# Patient Record
Sex: FEMALE | Race: WHITE | Employment: OTHER | URBAN - NONMETROPOLITAN AREA
[De-identification: names, ages, dates, MRNs, and addresses within clinical notes are randomized per-mention and may not be internally consistent; named-entity substitution may affect disease eponyms.]

---

## 2017-02-01 ENCOUNTER — OFFICE VISIT (OUTPATIENT)
Dept: CARDIOLOGY | Age: 82
End: 2017-02-01
Payer: MEDICARE

## 2017-02-01 VITALS
HEART RATE: 72 BPM | DIASTOLIC BLOOD PRESSURE: 74 MMHG | WEIGHT: 129 LBS | BODY MASS INDEX: 23.74 KG/M2 | SYSTOLIC BLOOD PRESSURE: 120 MMHG | HEIGHT: 62 IN

## 2017-02-01 DIAGNOSIS — R00.2 HEART PALPITATIONS: ICD-10-CM

## 2017-02-01 DIAGNOSIS — I38 VALVULAR HEART DISEASE: ICD-10-CM

## 2017-02-01 DIAGNOSIS — I10 ESSENTIAL HYPERTENSION: Primary | ICD-10-CM

## 2017-02-01 PROCEDURE — G8427 DOCREV CUR MEDS BY ELIG CLIN: HCPCS | Performed by: INTERNAL MEDICINE

## 2017-02-01 PROCEDURE — G8484 FLU IMMUNIZE NO ADMIN: HCPCS | Performed by: INTERNAL MEDICINE

## 2017-02-01 PROCEDURE — G8420 CALC BMI NORM PARAMETERS: HCPCS | Performed by: INTERNAL MEDICINE

## 2017-02-01 PROCEDURE — 1036F TOBACCO NON-USER: CPT | Performed by: INTERNAL MEDICINE

## 2017-02-01 PROCEDURE — 1123F ACP DISCUSS/DSCN MKR DOCD: CPT | Performed by: INTERNAL MEDICINE

## 2017-02-01 PROCEDURE — 99213 OFFICE O/P EST LOW 20 MIN: CPT | Performed by: INTERNAL MEDICINE

## 2017-02-01 PROCEDURE — 1090F PRES/ABSN URINE INCON ASSESS: CPT | Performed by: INTERNAL MEDICINE

## 2017-02-01 PROCEDURE — 4040F PNEUMOC VAC/ADMIN/RCVD: CPT | Performed by: INTERNAL MEDICINE

## 2017-05-16 ENCOUNTER — OFFICE VISIT (OUTPATIENT)
Dept: CARDIOLOGY | Age: 82
End: 2017-05-16
Payer: MEDICARE

## 2017-05-16 VITALS
BODY MASS INDEX: 22.5 KG/M2 | HEART RATE: 68 BPM | HEIGHT: 63 IN | WEIGHT: 127 LBS | SYSTOLIC BLOOD PRESSURE: 138 MMHG | DIASTOLIC BLOOD PRESSURE: 78 MMHG

## 2017-05-16 DIAGNOSIS — R00.2 HEART PALPITATIONS: ICD-10-CM

## 2017-05-16 DIAGNOSIS — I10 ESSENTIAL HYPERTENSION: Primary | ICD-10-CM

## 2017-05-16 DIAGNOSIS — I34.0 MITRAL VALVE INSUFFICIENCY, UNSPECIFIED ETIOLOGY: ICD-10-CM

## 2017-05-16 DIAGNOSIS — I35.1 AORTIC VALVE INSUFFICIENCY, UNSPECIFIED ETIOLOGY: ICD-10-CM

## 2017-05-16 PROCEDURE — G8419 CALC BMI OUT NRM PARAM NOF/U: HCPCS | Performed by: CLINICAL NURSE SPECIALIST

## 2017-05-16 PROCEDURE — 1123F ACP DISCUSS/DSCN MKR DOCD: CPT | Performed by: CLINICAL NURSE SPECIALIST

## 2017-05-16 PROCEDURE — 1090F PRES/ABSN URINE INCON ASSESS: CPT | Performed by: CLINICAL NURSE SPECIALIST

## 2017-05-16 PROCEDURE — 99213 OFFICE O/P EST LOW 20 MIN: CPT | Performed by: CLINICAL NURSE SPECIALIST

## 2017-05-16 PROCEDURE — 4040F PNEUMOC VAC/ADMIN/RCVD: CPT | Performed by: CLINICAL NURSE SPECIALIST

## 2017-05-16 PROCEDURE — G8427 DOCREV CUR MEDS BY ELIG CLIN: HCPCS | Performed by: CLINICAL NURSE SPECIALIST

## 2017-05-16 PROCEDURE — 1036F TOBACCO NON-USER: CPT | Performed by: CLINICAL NURSE SPECIALIST

## 2017-08-23 ENCOUNTER — HOSPITAL ENCOUNTER (OUTPATIENT)
Dept: CT IMAGING | Age: 82
Discharge: HOME | End: 2017-08-23
Payer: MEDICARE

## 2017-08-23 DIAGNOSIS — R91.1 PULMONARY NODULE: ICD-10-CM

## 2017-08-23 PROCEDURE — 71250 CT THORAX DX C-: CPT

## 2018-01-02 ENCOUNTER — HOSPITAL ENCOUNTER (OUTPATIENT)
Dept: ULTRASOUND IMAGING | Facility: HOSPITAL | Age: 83
Discharge: HOME | End: 2018-01-02
Attending: INTERNAL MEDICINE | Admitting: INTERNAL MEDICINE

## 2018-01-02 ENCOUNTER — TRANSCRIBE ORDERS (OUTPATIENT)
Dept: ADMINISTRATIVE | Facility: HOSPITAL | Age: 83
End: 2018-01-02

## 2018-01-02 DIAGNOSIS — R42 DIZZINESS AND GIDDINESS: ICD-10-CM

## 2018-01-02 DIAGNOSIS — R42 DIZZINESS AND GIDDINESS: Primary | ICD-10-CM

## 2018-01-02 PROCEDURE — 93880 EXTRACRANIAL BILAT STUDY: CPT

## 2018-01-22 ENCOUNTER — HOSPITAL ENCOUNTER (EMERGENCY)
Age: 83
Discharge: HOME | End: 2018-01-22
Attending: EMERGENCY MEDICINE
Payer: MEDICARE

## 2018-01-22 VITALS
HEART RATE: 85 BPM | SYSTOLIC BLOOD PRESSURE: 145 MMHG | RESPIRATION RATE: 16 BRPM | DIASTOLIC BLOOD PRESSURE: 67 MMHG | BODY MASS INDEX: 23 KG/M2 | OXYGEN SATURATION: 94 % | TEMPERATURE: 97.7 F | WEIGHT: 125 LBS | HEIGHT: 62 IN

## 2018-01-22 DIAGNOSIS — I10 ESSENTIAL HYPERTENSION: Primary | ICD-10-CM

## 2018-01-22 LAB
ANION GAP SERPL CALCULATED.3IONS-SCNC: 13 MMOL/L (ref 7–19)
BASOPHILS ABSOLUTE: 0 K/UL (ref 0–0.2)
BASOPHILS RELATIVE PERCENT: 0.4 % (ref 0–1)
BUN BLDV-MCNC: 14 MG/DL (ref 8–23)
CALCIUM SERPL-MCNC: 9.3 MG/DL (ref 8.8–10.2)
CHLORIDE BLD-SCNC: 98 MMOL/L (ref 98–111)
CO2: 29 MMOL/L (ref 22–29)
CREAT SERPL-MCNC: 0.9 MG/DL (ref 0.5–0.9)
EOSINOPHILS ABSOLUTE: 0.1 K/UL (ref 0–0.6)
EOSINOPHILS RELATIVE PERCENT: 1.2 % (ref 0–5)
GFR NON-AFRICAN AMERICAN: 59
GLUCOSE BLD-MCNC: 112 MG/DL (ref 74–109)
HCT VFR BLD CALC: 36.6 % (ref 37–47)
HEMOGLOBIN: 12.4 G/DL (ref 12–16)
LYMPHOCYTES ABSOLUTE: 1.1 K/UL (ref 1.1–4.5)
LYMPHOCYTES RELATIVE PERCENT: 21 % (ref 20–40)
MCH RBC QN AUTO: 30.3 PG (ref 27–31)
MCHC RBC AUTO-ENTMCNC: 33.9 G/DL (ref 33–37)
MCV RBC AUTO: 89.5 FL (ref 81–99)
MONOCYTES ABSOLUTE: 0.3 K/UL (ref 0–0.9)
MONOCYTES RELATIVE PERCENT: 5.4 % (ref 0–10)
NEUTROPHILS ABSOLUTE: 3.6 K/UL (ref 1.5–7.5)
NEUTROPHILS RELATIVE PERCENT: 71.8 % (ref 50–65)
PDW BLD-RTO: 12.6 % (ref 11.5–14.5)
PERFORMED ON: NORMAL
PLATELET # BLD: 162 K/UL (ref 130–400)
PMV BLD AUTO: 10.3 FL (ref 9.4–12.3)
POC TROPONIN I: 0 NG/ML (ref 0–0.08)
POTASSIUM SERPL-SCNC: 3.2 MMOL/L (ref 3.5–5)
RBC # BLD: 4.09 M/UL (ref 4.2–5.4)
SODIUM BLD-SCNC: 140 MMOL/L (ref 136–145)
WBC # BLD: 5 K/UL (ref 4.8–10.8)

## 2018-01-22 PROCEDURE — 80048 BASIC METABOLIC PNL TOTAL CA: CPT

## 2018-01-22 PROCEDURE — 99283 EMERGENCY DEPT VISIT LOW MDM: CPT

## 2018-01-22 PROCEDURE — 84484 ASSAY OF TROPONIN QUANT: CPT

## 2018-01-22 PROCEDURE — 36415 COLL VENOUS BLD VENIPUNCTURE: CPT

## 2018-01-22 PROCEDURE — 99283 EMERGENCY DEPT VISIT LOW MDM: CPT | Performed by: EMERGENCY MEDICINE

## 2018-01-22 PROCEDURE — 93005 ELECTROCARDIOGRAM TRACING: CPT

## 2018-01-22 PROCEDURE — 85025 COMPLETE CBC W/AUTO DIFF WBC: CPT

## 2018-01-22 RX ORDER — CLONIDINE HYDROCHLORIDE 0.1 MG/1
0.1 TABLET, EXTENDED RELEASE ORAL PRN
Status: ON HOLD | COMMUNITY
End: 2019-05-28 | Stop reason: ALTCHOICE

## 2018-01-22 RX ORDER — PRAVASTATIN SODIUM 10 MG
10 TABLET ORAL DAILY
Status: ON HOLD | COMMUNITY
End: 2019-05-28 | Stop reason: ALTCHOICE

## 2018-01-22 RX ORDER — CHLORTHALIDONE 25 MG/1
25 TABLET ORAL DAILY
COMMUNITY
End: 2018-01-27 | Stop reason: ALTCHOICE

## 2018-01-22 RX ORDER — ESTRADIOL 1 MG/1
1 TABLET ORAL DAILY
COMMUNITY
End: 2018-01-27 | Stop reason: ALTCHOICE

## 2018-01-22 ASSESSMENT — ENCOUNTER SYMPTOMS
SHORTNESS OF BREATH: 0
DIARRHEA: 0
BACK PAIN: 0
ABDOMINAL PAIN: 0
RHINORRHEA: 0
NAUSEA: 0
SORE THROAT: 0
VOMITING: 0

## 2018-01-24 LAB
EKG P AXIS: 67 DEGREES
EKG P-R INTERVAL: 158 MS
EKG Q-T INTERVAL: 402 MS
EKG QRS DURATION: 86 MS
EKG QTC CALCULATION (BAZETT): 428 MS
EKG T AXIS: 37 DEGREES

## 2018-01-27 ENCOUNTER — APPOINTMENT (OUTPATIENT)
Dept: CT IMAGING | Age: 83
End: 2018-01-27
Payer: MEDICARE

## 2018-01-27 ENCOUNTER — APPOINTMENT (OUTPATIENT)
Dept: GENERAL RADIOLOGY | Age: 83
End: 2018-01-27
Payer: MEDICARE

## 2018-01-27 ENCOUNTER — HOSPITAL ENCOUNTER (EMERGENCY)
Age: 83
Discharge: HOME | End: 2018-01-27
Attending: EMERGENCY MEDICINE
Payer: MEDICARE

## 2018-01-27 VITALS
RESPIRATION RATE: 16 BRPM | DIASTOLIC BLOOD PRESSURE: 64 MMHG | HEART RATE: 77 BPM | OXYGEN SATURATION: 96 % | SYSTOLIC BLOOD PRESSURE: 150 MMHG | TEMPERATURE: 98.1 F | BODY MASS INDEX: 22.26 KG/M2 | HEIGHT: 62 IN | WEIGHT: 121 LBS

## 2018-01-27 DIAGNOSIS — I10 ESSENTIAL HYPERTENSION: Primary | ICD-10-CM

## 2018-01-27 DIAGNOSIS — R51.9 ACUTE NONINTRACTABLE HEADACHE, UNSPECIFIED HEADACHE TYPE: ICD-10-CM

## 2018-01-27 LAB
ANION GAP SERPL CALCULATED.3IONS-SCNC: 11 MMOL/L (ref 7–19)
BASOPHILS ABSOLUTE: 0 K/UL (ref 0–0.2)
BASOPHILS RELATIVE PERCENT: 0.3 % (ref 0–1)
BUN BLDV-MCNC: 13 MG/DL (ref 8–23)
CALCIUM SERPL-MCNC: 9.1 MG/DL (ref 8.8–10.2)
CHLORIDE BLD-SCNC: 95 MMOL/L (ref 98–111)
CO2: 32 MMOL/L (ref 22–29)
CREAT SERPL-MCNC: 0.8 MG/DL (ref 0.5–0.9)
EOSINOPHILS ABSOLUTE: 0.2 K/UL (ref 0–0.6)
EOSINOPHILS RELATIVE PERCENT: 2.5 % (ref 0–5)
GFR NON-AFRICAN AMERICAN: >60
GLUCOSE BLD-MCNC: 117 MG/DL (ref 74–109)
HCT VFR BLD CALC: 33.6 % (ref 37–47)
HEMOGLOBIN: 11.4 G/DL (ref 12–16)
LYMPHOCYTES ABSOLUTE: 1.8 K/UL (ref 1.1–4.5)
LYMPHOCYTES RELATIVE PERCENT: 28.6 % (ref 20–40)
MCH RBC QN AUTO: 30.5 PG (ref 27–31)
MCHC RBC AUTO-ENTMCNC: 33.9 G/DL (ref 33–37)
MCV RBC AUTO: 89.8 FL (ref 81–99)
MONOCYTES ABSOLUTE: 0.5 K/UL (ref 0–0.9)
MONOCYTES RELATIVE PERCENT: 8.2 % (ref 0–10)
NEUTROPHILS ABSOLUTE: 3.9 K/UL (ref 1.5–7.5)
NEUTROPHILS RELATIVE PERCENT: 60.1 % (ref 50–65)
PDW BLD-RTO: 12.7 % (ref 11.5–14.5)
PERFORMED ON: NORMAL
PLATELET # BLD: 161 K/UL (ref 130–400)
PMV BLD AUTO: 10.4 FL (ref 9.4–12.3)
POC TROPONIN I: 0 NG/ML (ref 0–0.08)
POTASSIUM SERPL-SCNC: 3.3 MMOL/L (ref 3.5–5)
RBC # BLD: 3.74 M/UL (ref 4.2–5.4)
SODIUM BLD-SCNC: 138 MMOL/L (ref 136–145)
WBC # BLD: 6.4 K/UL (ref 4.8–10.8)

## 2018-01-27 PROCEDURE — 80048 BASIC METABOLIC PNL TOTAL CA: CPT

## 2018-01-27 PROCEDURE — 93005 ELECTROCARDIOGRAM TRACING: CPT

## 2018-01-27 PROCEDURE — 71045 X-RAY EXAM CHEST 1 VIEW: CPT

## 2018-01-27 PROCEDURE — 99284 EMERGENCY DEPT VISIT MOD MDM: CPT

## 2018-01-27 PROCEDURE — 70450 CT HEAD/BRAIN W/O DYE: CPT

## 2018-01-27 PROCEDURE — 85025 COMPLETE CBC W/AUTO DIFF WBC: CPT

## 2018-01-27 PROCEDURE — 84484 ASSAY OF TROPONIN QUANT: CPT

## 2018-01-27 PROCEDURE — 99285 EMERGENCY DEPT VISIT HI MDM: CPT | Performed by: EMERGENCY MEDICINE

## 2018-01-27 PROCEDURE — 36415 COLL VENOUS BLD VENIPUNCTURE: CPT

## 2018-01-27 RX ORDER — AMLODIPINE BESYLATE 2.5 MG/1
2.5 TABLET ORAL DAILY
Status: ON HOLD | COMMUNITY
End: 2019-05-28 | Stop reason: ALTCHOICE

## 2018-01-27 ASSESSMENT — ENCOUNTER SYMPTOMS
BACK PAIN: 0
SHORTNESS OF BREATH: 0
VOMITING: 0
NAUSEA: 0
ABDOMINAL PAIN: 0
SORE THROAT: 0
RHINORRHEA: 0
DIARRHEA: 0

## 2018-01-27 NOTE — ED PROVIDER NOTES
Negative for weakness. Psychiatric/Behavioral: Negative for confusion. A complete review of systems was performed and is negative except as noted above in the HPI. PAST MEDICAL HISTORY     Past Medical History:   Diagnosis Date    Heart palpitations     Hypertension          SURGICAL HISTORY       Past Surgical History:   Procedure Laterality Date    CAROTID ENDARTERECTOMY      CHOLECYSTECTOMY      HYSTERECTOMY      KIDNEY STONE SURGERY           CURRENT MEDICATIONS       Previous Medications    AMLODIPINE (NORVASC) 2.5 MG TABLET    Take 2.5 mg by mouth daily    CLONIDINE (KAPVAY) 0.1 MG TB12 EXTENDED RELEASE TABLET    Take 0.1 mg by mouth as needed    PRAVASTATIN (PRAVACHOL) 10 MG TABLET    Take 10 mg by mouth daily       ALLERGIES     Codeine    FAMILY HISTORY       Family History   Problem Relation Age of Onset    Stroke Mother      CAD, palpitations,  12          SOCIAL HISTORY       Social History     Social History    Marital status:      Spouse name: N/A    Number of children: N/A    Years of education: N/A     Social History Main Topics    Smoking status: Never Smoker    Smokeless tobacco: Never Used    Alcohol use No    Drug use: No    Sexual activity: Not Asked     Other Topics Concern    None     Social History Narrative    None       SCREENINGS             PHYSICAL EXAM    (up to 7 for level 4, 8 or more for level 5)     ED Triage Vitals [18 0233]   BP Temp Temp src Pulse Resp SpO2 Height Weight   (!) 152/91 98.1 °F (36.7 °C) -- 84 -- 95 % 5' 2\" (1.575 m) 121 lb (54.9 kg)       Physical Exam   Constitutional: She is oriented to person, place, and time. She appears well-developed and well-nourished. No distress. HENT:   Head: Normocephalic and atraumatic. Eyes: Pupils are equal, round, and reactive to light. Neck: Normal range of motion. Neck supple. Cardiovascular: Normal rate, regular rhythm, normal heart sounds and intact distal pulses.

## 2018-01-27 NOTE — ED NOTES
Patient placed in a gown Patient placed on cardiac monitor, continuous pulse oximeter, and NIBP monitor.  Monitor alarms on.       Jean-Pierre Ortiz RN  01/27/18 0625

## 2018-01-29 LAB
EKG P AXIS: 54 DEGREES
EKG P-R INTERVAL: 166 MS
EKG Q-T INTERVAL: 370 MS
EKG QRS DURATION: 88 MS
EKG QTC CALCULATION (BAZETT): 398 MS
EKG T AXIS: 22 DEGREES

## 2018-04-17 ENCOUNTER — HOSPITAL ENCOUNTER (OUTPATIENT)
Dept: NON INVASIVE DIAGNOSTICS | Age: 83
Discharge: HOME | End: 2018-04-17
Payer: MEDICARE

## 2018-04-17 ENCOUNTER — HOSPITAL ENCOUNTER (OUTPATIENT)
Dept: CT IMAGING | Age: 83
Discharge: HOME | End: 2018-04-17
Payer: MEDICARE

## 2018-04-17 DIAGNOSIS — R91.1 LUNG NODULE: ICD-10-CM

## 2018-04-17 PROCEDURE — 71250 CT THORAX DX C-: CPT

## 2018-04-17 PROCEDURE — 93225 XTRNL ECG REC<48 HRS REC: CPT

## 2018-04-17 PROCEDURE — 93227 XTRNL ECG REC<48 HR R&I: CPT | Performed by: INTERNAL MEDICINE

## 2018-04-17 PROCEDURE — 93226 XTRNL ECG REC<48 HR SCAN A/R: CPT

## 2018-05-28 ENCOUNTER — HOSPITAL ENCOUNTER (EMERGENCY)
Age: 83
Discharge: HOME | End: 2018-05-28
Payer: MEDICARE

## 2018-05-28 VITALS
HEIGHT: 63 IN | RESPIRATION RATE: 18 BRPM | DIASTOLIC BLOOD PRESSURE: 68 MMHG | OXYGEN SATURATION: 94 % | HEART RATE: 69 BPM | TEMPERATURE: 97.5 F | BODY MASS INDEX: 21.26 KG/M2 | SYSTOLIC BLOOD PRESSURE: 136 MMHG | WEIGHT: 120 LBS

## 2018-05-28 DIAGNOSIS — I10 ELEVATED HEART RATE WITH ELEVATED BLOOD PRESSURE AND DIAGNOSIS OF HYPERTENSION: Primary | ICD-10-CM

## 2018-05-28 DIAGNOSIS — R00.9 ELEVATED HEART RATE WITH ELEVATED BLOOD PRESSURE AND DIAGNOSIS OF HYPERTENSION: Primary | ICD-10-CM

## 2018-05-28 LAB
ALBUMIN SERPL-MCNC: 4.3 G/DL (ref 3.5–5.2)
ALP BLD-CCNC: 46 U/L (ref 35–104)
ALT SERPL-CCNC: 11 U/L (ref 5–33)
ANION GAP SERPL CALCULATED.3IONS-SCNC: 13 MMOL/L (ref 7–19)
AST SERPL-CCNC: 17 U/L (ref 5–32)
BASOPHILS ABSOLUTE: 0 K/UL (ref 0–0.2)
BASOPHILS RELATIVE PERCENT: 0.4 % (ref 0–1)
BILIRUB SERPL-MCNC: 0.4 MG/DL (ref 0.2–1.2)
BUN BLDV-MCNC: 11 MG/DL (ref 8–23)
CALCIUM SERPL-MCNC: 9.1 MG/DL (ref 8.2–9.6)
CHLORIDE BLD-SCNC: 95 MMOL/L (ref 98–111)
CO2: 32 MMOL/L (ref 22–29)
CREAT SERPL-MCNC: 0.8 MG/DL (ref 0.5–0.9)
EOSINOPHILS ABSOLUTE: 0.1 K/UL (ref 0–0.6)
EOSINOPHILS RELATIVE PERCENT: 1.5 % (ref 0–5)
GFR NON-AFRICAN AMERICAN: >60
GLUCOSE BLD-MCNC: 131 MG/DL (ref 74–109)
HCT VFR BLD CALC: 36.1 % (ref 37–47)
HEMOGLOBIN: 12.2 G/DL (ref 12–16)
LYMPHOCYTES ABSOLUTE: 1 K/UL (ref 1.1–4.5)
LYMPHOCYTES RELATIVE PERCENT: 18.8 % (ref 20–40)
MCH RBC QN AUTO: 29.2 PG (ref 27–31)
MCHC RBC AUTO-ENTMCNC: 33.8 G/DL (ref 33–37)
MCV RBC AUTO: 86.4 FL (ref 81–99)
MONOCYTES ABSOLUTE: 0.5 K/UL (ref 0–0.9)
MONOCYTES RELATIVE PERCENT: 8.9 % (ref 0–10)
NEUTROPHILS ABSOLUTE: 3.9 K/UL (ref 1.5–7.5)
NEUTROPHILS RELATIVE PERCENT: 70.2 % (ref 50–65)
PDW BLD-RTO: 12.7 % (ref 11.5–14.5)
PERFORMED ON: NORMAL
PLATELET # BLD: 185 K/UL (ref 130–400)
PMV BLD AUTO: 9.8 FL (ref 9.4–12.3)
POC TROPONIN I: 0 NG/ML (ref 0–0.08)
POTASSIUM SERPL-SCNC: 3.2 MMOL/L (ref 3.5–5)
RBC # BLD: 4.18 M/UL (ref 4.2–5.4)
SODIUM BLD-SCNC: 140 MMOL/L (ref 136–145)
TOTAL PROTEIN: 6.5 G/DL (ref 6.6–8.7)
WBC # BLD: 5.5 K/UL (ref 4.8–10.8)

## 2018-05-28 PROCEDURE — 99283 EMERGENCY DEPT VISIT LOW MDM: CPT | Performed by: NURSE PRACTITIONER

## 2018-05-28 PROCEDURE — 85025 COMPLETE CBC W/AUTO DIFF WBC: CPT

## 2018-05-28 PROCEDURE — 80053 COMPREHEN METABOLIC PANEL: CPT

## 2018-05-28 PROCEDURE — 93005 ELECTROCARDIOGRAM TRACING: CPT

## 2018-05-28 PROCEDURE — 36415 COLL VENOUS BLD VENIPUNCTURE: CPT

## 2018-05-28 PROCEDURE — 99283 EMERGENCY DEPT VISIT LOW MDM: CPT

## 2018-05-28 PROCEDURE — 84484 ASSAY OF TROPONIN QUANT: CPT

## 2018-05-28 RX ORDER — CHLORTHALIDONE 25 MG/1
25 TABLET ORAL DAILY
Status: ON HOLD | COMMUNITY
End: 2019-05-28 | Stop reason: ALTCHOICE

## 2018-05-28 ASSESSMENT — ENCOUNTER SYMPTOMS
SHORTNESS OF BREATH: 0
VOMITING: 0

## 2018-05-30 LAB
EKG P AXIS: 51 DEGREES
EKG P-R INTERVAL: 160 MS
EKG Q-T INTERVAL: 374 MS
EKG QRS DURATION: 88 MS
EKG QTC CALCULATION (BAZETT): 410 MS
EKG T AXIS: 46 DEGREES

## 2018-08-03 ENCOUNTER — HOSPITAL ENCOUNTER (EMERGENCY)
Age: 83
Discharge: HOME | End: 2018-08-03
Attending: EMERGENCY MEDICINE
Payer: MEDICARE

## 2018-08-03 ENCOUNTER — APPOINTMENT (OUTPATIENT)
Dept: CT IMAGING | Age: 83
End: 2018-08-03
Payer: MEDICARE

## 2018-08-03 VITALS
TEMPERATURE: 97.3 F | WEIGHT: 118 LBS | OXYGEN SATURATION: 91 % | SYSTOLIC BLOOD PRESSURE: 143 MMHG | BODY MASS INDEX: 21.71 KG/M2 | RESPIRATION RATE: 18 BRPM | HEART RATE: 71 BPM | HEIGHT: 62 IN | DIASTOLIC BLOOD PRESSURE: 58 MMHG

## 2018-08-03 DIAGNOSIS — R42 DIZZINESS: Primary | ICD-10-CM

## 2018-08-03 DIAGNOSIS — E87.6 HYPOKALEMIA: ICD-10-CM

## 2018-08-03 DIAGNOSIS — R03.0 ELEVATED BLOOD PRESSURE READING: ICD-10-CM

## 2018-08-03 LAB
ALBUMIN SERPL-MCNC: 4.2 G/DL (ref 3.5–5.2)
ALP BLD-CCNC: 47 U/L (ref 35–104)
ALT SERPL-CCNC: 10 U/L (ref 5–33)
ANION GAP SERPL CALCULATED.3IONS-SCNC: 13 MMOL/L (ref 7–19)
AST SERPL-CCNC: 18 U/L (ref 5–32)
BASOPHILS ABSOLUTE: 0 K/UL (ref 0–0.2)
BASOPHILS RELATIVE PERCENT: 0.5 % (ref 0–1)
BILIRUB SERPL-MCNC: 0.4 MG/DL (ref 0.2–1.2)
BUN BLDV-MCNC: 9 MG/DL (ref 8–23)
CALCIUM SERPL-MCNC: 9.3 MG/DL (ref 8.2–9.6)
CHLORIDE BLD-SCNC: 91 MMOL/L (ref 98–111)
CO2: 30 MMOL/L (ref 22–29)
CREAT SERPL-MCNC: 0.7 MG/DL (ref 0.5–0.9)
EOSINOPHILS ABSOLUTE: 0.2 K/UL (ref 0–0.6)
EOSINOPHILS RELATIVE PERCENT: 2.5 % (ref 0–5)
GFR NON-AFRICAN AMERICAN: >60
GLUCOSE BLD-MCNC: 108 MG/DL (ref 74–109)
HCT VFR BLD CALC: 38.6 % (ref 37–47)
HEMOGLOBIN: 13.3 G/DL (ref 12–16)
LYMPHOCYTES ABSOLUTE: 2 K/UL (ref 1.1–4.5)
LYMPHOCYTES RELATIVE PERCENT: 31.2 % (ref 20–40)
MAGNESIUM: 1.8 MG/DL (ref 1.7–2.3)
MCH RBC QN AUTO: 29.8 PG (ref 27–31)
MCHC RBC AUTO-ENTMCNC: 34.5 G/DL (ref 33–37)
MCV RBC AUTO: 86.4 FL (ref 81–99)
MONOCYTES ABSOLUTE: 0.4 K/UL (ref 0–0.9)
MONOCYTES RELATIVE PERCENT: 6.8 % (ref 0–10)
NEUTROPHILS ABSOLUTE: 3.8 K/UL (ref 1.5–7.5)
NEUTROPHILS RELATIVE PERCENT: 58.7 % (ref 50–65)
PDW BLD-RTO: 12.6 % (ref 11.5–14.5)
PLATELET # BLD: 187 K/UL (ref 130–400)
PMV BLD AUTO: 9.7 FL (ref 9.4–12.3)
POTASSIUM REFLEX MAGNESIUM: 2.8 MMOL/L (ref 3.5–5)
RBC # BLD: 4.47 M/UL (ref 4.2–5.4)
SODIUM BLD-SCNC: 134 MMOL/L (ref 136–145)
TOTAL PROTEIN: 7.3 G/DL (ref 6.6–8.7)
TROPONIN: <0.01 NG/ML (ref 0–0.03)
WBC # BLD: 6.5 K/UL (ref 4.8–10.8)

## 2018-08-03 PROCEDURE — 99285 EMERGENCY DEPT VISIT HI MDM: CPT | Performed by: EMERGENCY MEDICINE

## 2018-08-03 PROCEDURE — 85025 COMPLETE CBC W/AUTO DIFF WBC: CPT

## 2018-08-03 PROCEDURE — 83735 ASSAY OF MAGNESIUM: CPT

## 2018-08-03 PROCEDURE — 6370000000 HC RX 637 (ALT 250 FOR IP): Performed by: EMERGENCY MEDICINE

## 2018-08-03 PROCEDURE — 36415 COLL VENOUS BLD VENIPUNCTURE: CPT

## 2018-08-03 PROCEDURE — 80053 COMPREHEN METABOLIC PANEL: CPT

## 2018-08-03 PROCEDURE — 84484 ASSAY OF TROPONIN QUANT: CPT

## 2018-08-03 PROCEDURE — 99284 EMERGENCY DEPT VISIT MOD MDM: CPT

## 2018-08-03 PROCEDURE — 70450 CT HEAD/BRAIN W/O DYE: CPT

## 2018-08-03 PROCEDURE — 93005 ELECTROCARDIOGRAM TRACING: CPT

## 2018-08-03 RX ORDER — POTASSIUM CHLORIDE 750 MG/1
10 TABLET, EXTENDED RELEASE ORAL 2 TIMES DAILY
Qty: 20 TABLET | Refills: 0 | Status: ON HOLD | OUTPATIENT
Start: 2018-08-03 | End: 2019-05-31 | Stop reason: HOSPADM

## 2018-08-03 RX ORDER — POTASSIUM CHLORIDE 3 G/15ML
40 SOLUTION ORAL ONCE
Status: COMPLETED | OUTPATIENT
Start: 2018-08-03 | End: 2018-08-03

## 2018-08-03 RX ADMIN — Medication 40 MEQ: at 06:31

## 2018-08-03 ASSESSMENT — ENCOUNTER SYMPTOMS
EYE DISCHARGE: 0
VOICE CHANGE: 0
NAUSEA: 0
FACIAL SWELLING: 0
DIARRHEA: 0
SHORTNESS OF BREATH: 0
CONSTIPATION: 0
BLOOD IN STOOL: 0
APNEA: 0
SINUS PRESSURE: 0
CHOKING: 0
ABDOMINAL PAIN: 0
SORE THROAT: 0

## 2018-08-03 NOTE — ED TRIAGE NOTES
PT states she woke up this morning around 0200 due to feeling heart palpitations. States she took her clonidine, norvasc and chlorthalidone pill at that time. PT states she has still been feeling dizzy so she called EMS. Pt states that she felt dizzy yesterday as well.

## 2018-08-10 LAB
EKG P AXIS: 50 DEGREES
EKG P-R INTERVAL: 172 MS
EKG Q-T INTERVAL: 384 MS
EKG QRS DURATION: 94 MS
EKG QTC CALCULATION (BAZETT): 415 MS
EKG T AXIS: 41 DEGREES

## 2018-10-28 ENCOUNTER — APPOINTMENT (OUTPATIENT)
Dept: GENERAL RADIOLOGY | Age: 83
End: 2018-10-28
Payer: MEDICARE

## 2018-10-28 ENCOUNTER — HOSPITAL ENCOUNTER (EMERGENCY)
Age: 83
Discharge: HOME | End: 2018-10-28
Attending: EMERGENCY MEDICINE
Payer: MEDICARE

## 2018-10-28 VITALS
TEMPERATURE: 97.8 F | OXYGEN SATURATION: 95 % | HEART RATE: 83 BPM | SYSTOLIC BLOOD PRESSURE: 150 MMHG | DIASTOLIC BLOOD PRESSURE: 72 MMHG | BODY MASS INDEX: 20.67 KG/M2 | WEIGHT: 113 LBS | RESPIRATION RATE: 16 BRPM

## 2018-10-28 DIAGNOSIS — R06.00 ACUTE DYSPNEA: Primary | ICD-10-CM

## 2018-10-28 LAB
ALBUMIN SERPL-MCNC: 4.5 G/DL (ref 3.5–5.2)
ALP BLD-CCNC: 58 U/L (ref 35–104)
ALT SERPL-CCNC: 14 U/L (ref 5–33)
ANION GAP SERPL CALCULATED.3IONS-SCNC: 14 MMOL/L (ref 7–19)
APTT: 32.8 SEC (ref 26–36.2)
AST SERPL-CCNC: 19 U/L (ref 5–32)
BASOPHILS ABSOLUTE: 0 K/UL (ref 0–0.2)
BASOPHILS RELATIVE PERCENT: 0.1 % (ref 0–1)
BILIRUB SERPL-MCNC: 0.3 MG/DL (ref 0.2–1.2)
BUN BLDV-MCNC: 17 MG/DL (ref 8–23)
CALCIUM SERPL-MCNC: 9.7 MG/DL (ref 8.2–9.6)
CHLORIDE BLD-SCNC: 91 MMOL/L (ref 98–111)
CO2: 29 MMOL/L (ref 22–29)
CREAT SERPL-MCNC: 0.9 MG/DL (ref 0.5–0.9)
EOSINOPHILS ABSOLUTE: 0 K/UL (ref 0–0.6)
EOSINOPHILS RELATIVE PERCENT: 0.1 % (ref 0–5)
GFR NON-AFRICAN AMERICAN: 59
GLUCOSE BLD-MCNC: 211 MG/DL (ref 74–109)
HCT VFR BLD CALC: 40.5 % (ref 37–47)
HEMOGLOBIN: 13.8 G/DL (ref 12–16)
INR BLD: 0.91 (ref 0.88–1.18)
LYMPHOCYTES ABSOLUTE: 2.2 K/UL (ref 1.1–4.5)
LYMPHOCYTES RELATIVE PERCENT: 23.2 % (ref 20–40)
MCH RBC QN AUTO: 29.6 PG (ref 27–31)
MCHC RBC AUTO-ENTMCNC: 34.1 G/DL (ref 33–37)
MCV RBC AUTO: 86.7 FL (ref 81–99)
MONOCYTES ABSOLUTE: 0.5 K/UL (ref 0–0.9)
MONOCYTES RELATIVE PERCENT: 5.2 % (ref 0–10)
NEUTROPHILS ABSOLUTE: 6.8 K/UL (ref 1.5–7.5)
NEUTROPHILS RELATIVE PERCENT: 71.1 % (ref 50–65)
PDW BLD-RTO: 12.9 % (ref 11.5–14.5)
PERFORMED ON: NORMAL
PLATELET # BLD: 236 K/UL (ref 130–400)
PMV BLD AUTO: 10 FL (ref 9.4–12.3)
POC TROPONIN I: 0.01 NG/ML (ref 0–0.08)
POTASSIUM SERPL-SCNC: 2.9 MMOL/L (ref 3.5–5)
PRO-BNP: 290 PG/ML (ref 0–1800)
PROTHROMBIN TIME: 12.2 SEC (ref 12–14.6)
RBC # BLD: 4.67 M/UL (ref 4.2–5.4)
SODIUM BLD-SCNC: 134 MMOL/L (ref 136–145)
TOTAL PROTEIN: 7.8 G/DL (ref 6.6–8.7)
TROPONIN: <0.01 NG/ML (ref 0–0.03)
TROPONIN: <0.01 NG/ML (ref 0–0.03)
WBC # BLD: 9.6 K/UL (ref 4.8–10.8)

## 2018-10-28 PROCEDURE — 85730 THROMBOPLASTIN TIME PARTIAL: CPT

## 2018-10-28 PROCEDURE — 85610 PROTHROMBIN TIME: CPT

## 2018-10-28 PROCEDURE — 36415 COLL VENOUS BLD VENIPUNCTURE: CPT

## 2018-10-28 PROCEDURE — 83880 ASSAY OF NATRIURETIC PEPTIDE: CPT

## 2018-10-28 PROCEDURE — 84484 ASSAY OF TROPONIN QUANT: CPT

## 2018-10-28 PROCEDURE — 99285 EMERGENCY DEPT VISIT HI MDM: CPT

## 2018-10-28 PROCEDURE — 99284 EMERGENCY DEPT VISIT MOD MDM: CPT | Performed by: EMERGENCY MEDICINE

## 2018-10-28 PROCEDURE — 71045 X-RAY EXAM CHEST 1 VIEW: CPT

## 2018-10-28 PROCEDURE — 6370000000 HC RX 637 (ALT 250 FOR IP): Performed by: EMERGENCY MEDICINE

## 2018-10-28 PROCEDURE — 93005 ELECTROCARDIOGRAM TRACING: CPT

## 2018-10-28 PROCEDURE — 80053 COMPREHEN METABOLIC PANEL: CPT

## 2018-10-28 PROCEDURE — 85025 COMPLETE CBC W/AUTO DIFF WBC: CPT

## 2018-10-28 RX ORDER — POTASSIUM CHLORIDE 20MEQ/15ML
40 LIQUID (ML) ORAL ONCE
Status: COMPLETED | OUTPATIENT
Start: 2018-10-28 | End: 2018-10-28

## 2018-10-28 RX ADMIN — POTASSIUM CHLORIDE 40 MEQ: 20 SOLUTION ORAL at 17:32

## 2018-10-28 NOTE — ED PROVIDER NOTES
Arm, Right (5b. ): No drift  Motor Leg, Left (6a. ): No drift  Motor Leg, Right (6b. ): No drift  Limb Ataxia (7. ): Absent  Sensory (8. ): Normal  Best Language (9. ): No aphasia  Dysarthria (10. ): Normal  Extinction and Inattention (11): No neglect  Total: 0Glasgow Coma Scale  Eye Opening: Spontaneous  Best Verbal Response: Oriented  Best Motor Response: Obeys commands  Ramiro Coma Scale Score: 15        PHYSICAL EXAM    (up to 7 for level 4, 8 or more for level 5)     ED Triage Vitals [10/28/18 1518]   BP Temp Temp src Pulse Resp SpO2 Height Weight   -- -- -- -- -- -- -- 113 lb (51.3 kg)       Physical Exam   Constitutional: She is oriented to person, place, and time. She appears well-developed and well-nourished. No distress. HENT:   Head: Normocephalic and atraumatic. Right Ear: External ear normal.   Left Ear: External ear normal.   Eyes: Conjunctivae and EOM are normal.   Neck: Normal range of motion. No tracheal deviation present. Cardiovascular: Normal rate, regular rhythm, normal heart sounds and intact distal pulses. No murmur heard. Pulmonary/Chest: Breath sounds normal. No respiratory distress. She has no wheezes. She has no rales. Abdominal: Soft. There is no tenderness. Musculoskeletal: Normal range of motion. She exhibits no edema. Neurological: She is alert and oriented to person, place, and time. GCS eye subscore is 4. GCS verbal subscore is 5. GCS motor subscore is 6. Skin: Skin is warm and dry. She is not diaphoretic. Vitals reviewed.       DIAGNOSTIC RESULTS     EKG: All EKG's areinterpreted by the Emergency Department Physician who either signs or Co-signs this chart in the absence of a cardiologist.    111 Sinus tachycardia, nondiagnostic EKG    77, normal sinus rhythm, nondiagnostic EKG    RADIOLOGY:  Non-plain film images such as CT, Ultrasound and MRI are read by the radiologist. Plain radiographic images are visualized and preliminarily interpreted bythe emergency

## 2018-10-29 LAB
EKG P AXIS: 53 DEGREES
EKG P-R INTERVAL: 150 MS
EKG Q-T INTERVAL: 308 MS
EKG QRS DURATION: 88 MS
EKG QTC CALCULATION (BAZETT): 400 MS
EKG T AXIS: 66 DEGREES

## 2018-10-29 ASSESSMENT — ENCOUNTER SYMPTOMS
DIARRHEA: 0
NAUSEA: 0
SORE THROAT: 0
BACK PAIN: 0
COUGH: 0
ABDOMINAL PAIN: 0
RHINORRHEA: 0
SHORTNESS OF BREATH: 1
VOMITING: 0

## 2019-01-03 ENCOUNTER — HOSPITAL ENCOUNTER (EMERGENCY)
Age: 84
Discharge: HOME | End: 2019-01-04
Attending: EMERGENCY MEDICINE
Payer: MEDICARE

## 2019-01-03 VITALS
HEIGHT: 62 IN | WEIGHT: 113 LBS | HEART RATE: 78 BPM | BODY MASS INDEX: 20.8 KG/M2 | OXYGEN SATURATION: 94 % | TEMPERATURE: 98.6 F | RESPIRATION RATE: 18 BRPM | SYSTOLIC BLOOD PRESSURE: 171 MMHG | DIASTOLIC BLOOD PRESSURE: 66 MMHG

## 2019-01-03 DIAGNOSIS — R03.0 ELEVATED BLOOD PRESSURE, SITUATIONAL: Primary | ICD-10-CM

## 2019-01-03 DIAGNOSIS — E87.6 HYPOKALEMIA: ICD-10-CM

## 2019-01-03 LAB
ALBUMIN SERPL-MCNC: 4.7 G/DL (ref 3.5–5.2)
ALP BLD-CCNC: 55 U/L (ref 35–104)
ALT SERPL-CCNC: 19 U/L (ref 5–33)
ANION GAP SERPL CALCULATED.3IONS-SCNC: 16 MMOL/L (ref 7–19)
AST SERPL-CCNC: 24 U/L (ref 5–32)
BASOPHILS ABSOLUTE: 0 K/UL (ref 0–0.2)
BASOPHILS RELATIVE PERCENT: 0.2 % (ref 0–1)
BILIRUB SERPL-MCNC: 0.4 MG/DL (ref 0.2–1.2)
BUN BLDV-MCNC: 23 MG/DL (ref 8–23)
CALCIUM SERPL-MCNC: 9.8 MG/DL (ref 8.2–9.6)
CHLORIDE BLD-SCNC: 88 MMOL/L (ref 98–111)
CO2: 29 MMOL/L (ref 22–29)
CREAT SERPL-MCNC: 1.2 MG/DL (ref 0.5–0.9)
EOSINOPHILS ABSOLUTE: 0 K/UL (ref 0–0.6)
EOSINOPHILS RELATIVE PERCENT: 0.2 % (ref 0–5)
GFR NON-AFRICAN AMERICAN: 42
GLUCOSE BLD-MCNC: 107 MG/DL (ref 74–109)
HCT VFR BLD CALC: 41.9 % (ref 37–47)
HEMOGLOBIN: 14.6 G/DL (ref 12–16)
LYMPHOCYTES ABSOLUTE: 1.7 K/UL (ref 1.1–4.5)
LYMPHOCYTES RELATIVE PERCENT: 14.2 % (ref 20–40)
MCH RBC QN AUTO: 29.4 PG (ref 27–31)
MCHC RBC AUTO-ENTMCNC: 34.8 G/DL (ref 33–37)
MCV RBC AUTO: 84.5 FL (ref 81–99)
MONOCYTES ABSOLUTE: 0.7 K/UL (ref 0–0.9)
MONOCYTES RELATIVE PERCENT: 5.7 % (ref 0–10)
NEUTROPHILS ABSOLUTE: 9.5 K/UL (ref 1.5–7.5)
NEUTROPHILS RELATIVE PERCENT: 79.2 % (ref 50–65)
PDW BLD-RTO: 13 % (ref 11.5–14.5)
PLATELET # BLD: 228 K/UL (ref 130–400)
PMV BLD AUTO: 10.2 FL (ref 9.4–12.3)
POTASSIUM SERPL-SCNC: 2.8 MMOL/L (ref 3.5–5)
RBC # BLD: 4.96 M/UL (ref 4.2–5.4)
SODIUM BLD-SCNC: 133 MMOL/L (ref 136–145)
TOTAL PROTEIN: 8.1 G/DL (ref 6.6–8.7)
TROPONIN: <0.01 NG/ML (ref 0–0.03)
WBC # BLD: 12 K/UL (ref 4.8–10.8)

## 2019-01-03 PROCEDURE — 80053 COMPREHEN METABOLIC PANEL: CPT

## 2019-01-03 PROCEDURE — 84484 ASSAY OF TROPONIN QUANT: CPT

## 2019-01-03 PROCEDURE — 36415 COLL VENOUS BLD VENIPUNCTURE: CPT

## 2019-01-03 PROCEDURE — 85025 COMPLETE CBC W/AUTO DIFF WBC: CPT

## 2019-01-03 PROCEDURE — 93005 ELECTROCARDIOGRAM TRACING: CPT

## 2019-01-03 PROCEDURE — 99283 EMERGENCY DEPT VISIT LOW MDM: CPT

## 2019-01-04 LAB
EKG P AXIS: 66 DEGREES
EKG P-R INTERVAL: 168 MS
EKG Q-T INTERVAL: 386 MS
EKG QRS DURATION: 90 MS
EKG QTC CALCULATION (BAZETT): 407 MS
EKG T AXIS: 63 DEGREES

## 2019-01-04 PROCEDURE — 99285 EMERGENCY DEPT VISIT HI MDM: CPT | Performed by: EMERGENCY MEDICINE

## 2019-01-04 ASSESSMENT — ENCOUNTER SYMPTOMS
SORE THROAT: 0
PHOTOPHOBIA: 0
ABDOMINAL PAIN: 0
DIARRHEA: 0
TROUBLE SWALLOWING: 0
EYE PAIN: 0
COLOR CHANGE: 0
CHEST TIGHTNESS: 0
RHINORRHEA: 0
COUGH: 0
CONSTIPATION: 0
BACK PAIN: 0
SHORTNESS OF BREATH: 0
ABDOMINAL DISTENTION: 0
NAUSEA: 0
WHEEZING: 0
VOMITING: 0

## 2019-01-10 ENCOUNTER — HOSPITAL ENCOUNTER (INPATIENT)
Age: 84
LOS: 2 days | Discharge: HOME | DRG: 392 | End: 2019-01-12
Attending: INTERNAL MEDICINE | Admitting: INTERNAL MEDICINE
Payer: MEDICARE

## 2019-01-10 ENCOUNTER — APPOINTMENT (OUTPATIENT)
Dept: GENERAL RADIOLOGY | Age: 84
DRG: 392 | End: 2019-01-10
Attending: INTERNAL MEDICINE
Payer: MEDICARE

## 2019-01-10 ENCOUNTER — APPOINTMENT (OUTPATIENT)
Dept: CT IMAGING | Age: 84
DRG: 392 | End: 2019-01-10
Attending: INTERNAL MEDICINE
Payer: MEDICARE

## 2019-01-10 PROBLEM — K57.92 DIVERTICULITIS: Status: ACTIVE | Noted: 2019-01-10

## 2019-01-10 LAB
ALBUMIN SERPL-MCNC: 4.2 G/DL (ref 3.5–5.2)
ALP BLD-CCNC: 48 U/L (ref 35–104)
ALT SERPL-CCNC: 16 U/L (ref 5–33)
ANION GAP SERPL CALCULATED.3IONS-SCNC: 19 MMOL/L (ref 7–19)
AST SERPL-CCNC: 26 U/L (ref 5–32)
BASOPHILS ABSOLUTE: 0 K/UL (ref 0–0.2)
BASOPHILS RELATIVE PERCENT: 0.1 % (ref 0–1)
BILIRUB SERPL-MCNC: 0.6 MG/DL (ref 0.2–1.2)
BUN BLDV-MCNC: 22 MG/DL (ref 8–23)
CALCIUM SERPL-MCNC: 9.9 MG/DL (ref 8.2–9.6)
CHLORIDE BLD-SCNC: 91 MMOL/L (ref 98–111)
CO2: 26 MMOL/L (ref 22–29)
CREAT SERPL-MCNC: 1.2 MG/DL (ref 0.5–0.9)
EOSINOPHILS ABSOLUTE: 0 K/UL (ref 0–0.6)
EOSINOPHILS RELATIVE PERCENT: 0.1 % (ref 0–5)
GFR NON-AFRICAN AMERICAN: 42
GLUCOSE BLD-MCNC: 113 MG/DL (ref 74–109)
HCT VFR BLD CALC: 39.4 % (ref 37–47)
HEMOGLOBIN: 13.4 G/DL (ref 12–16)
LYMPHOCYTES ABSOLUTE: 1.4 K/UL (ref 1.1–4.5)
LYMPHOCYTES RELATIVE PERCENT: 15.7 % (ref 20–40)
MAGNESIUM: 1.8 MG/DL (ref 1.7–2.3)
MCH RBC QN AUTO: 29.5 PG (ref 27–31)
MCHC RBC AUTO-ENTMCNC: 34 G/DL (ref 33–37)
MCV RBC AUTO: 86.8 FL (ref 81–99)
MONOCYTES ABSOLUTE: 0.6 K/UL (ref 0–0.9)
MONOCYTES RELATIVE PERCENT: 6.6 % (ref 0–10)
NEUTROPHILS ABSOLUTE: 6.7 K/UL (ref 1.5–7.5)
NEUTROPHILS RELATIVE PERCENT: 76.9 % (ref 50–65)
PDW BLD-RTO: 12.9 % (ref 11.5–14.5)
PLATELET # BLD: 225 K/UL (ref 130–400)
PMV BLD AUTO: 10 FL (ref 9.4–12.3)
POTASSIUM REFLEX MAGNESIUM: 3.4 MMOL/L (ref 3.5–5)
RBC # BLD: 4.54 M/UL (ref 4.2–5.4)
SODIUM BLD-SCNC: 136 MMOL/L (ref 136–145)
TOTAL PROTEIN: 7.5 G/DL (ref 6.6–8.7)
WBC # BLD: 8.7 K/UL (ref 4.8–10.8)

## 2019-01-10 PROCEDURE — 2580000003 HC RX 258: Performed by: INTERNAL MEDICINE

## 2019-01-10 PROCEDURE — 1210000000 HC MED SURG R&B

## 2019-01-10 PROCEDURE — 85025 COMPLETE CBC W/AUTO DIFF WBC: CPT

## 2019-01-10 PROCEDURE — 36415 COLL VENOUS BLD VENIPUNCTURE: CPT

## 2019-01-10 PROCEDURE — 74176 CT ABD & PELVIS W/O CONTRAST: CPT

## 2019-01-10 PROCEDURE — 71046 X-RAY EXAM CHEST 2 VIEWS: CPT

## 2019-01-10 PROCEDURE — 6360000002 HC RX W HCPCS: Performed by: INTERNAL MEDICINE

## 2019-01-10 PROCEDURE — 83735 ASSAY OF MAGNESIUM: CPT

## 2019-01-10 PROCEDURE — 80053 COMPREHEN METABOLIC PANEL: CPT

## 2019-01-10 PROCEDURE — 2500000003 HC RX 250 WO HCPCS: Performed by: INTERNAL MEDICINE

## 2019-01-10 RX ORDER — ONDANSETRON 2 MG/ML
4 INJECTION INTRAMUSCULAR; INTRAVENOUS EVERY 6 HOURS PRN
Status: DISCONTINUED | OUTPATIENT
Start: 2019-01-10 | End: 2019-01-12 | Stop reason: HOSPADM

## 2019-01-10 RX ORDER — LEVOFLOXACIN 5 MG/ML
500 INJECTION, SOLUTION INTRAVENOUS EVERY 24 HOURS
Status: DISCONTINUED | OUTPATIENT
Start: 2019-01-10 | End: 2019-01-11 | Stop reason: DRUGHIGH

## 2019-01-10 RX ORDER — SODIUM CHLORIDE 450 MG/100ML
INJECTION, SOLUTION INTRAVENOUS CONTINUOUS
Status: DISCONTINUED | OUTPATIENT
Start: 2019-01-10 | End: 2019-01-12 | Stop reason: HOSPADM

## 2019-01-10 RX ORDER — PANTOPRAZOLE SODIUM 40 MG/1
40 GRANULE, DELAYED RELEASE ORAL
COMMUNITY

## 2019-01-10 RX ORDER — CLONIDINE HYDROCHLORIDE 0.1 MG/1
0.1 TABLET ORAL 2 TIMES DAILY
COMMUNITY

## 2019-01-10 RX ORDER — SODIUM CHLORIDE 0.9 % (FLUSH) 0.9 %
10 SYRINGE (ML) INJECTION PRN
Status: DISCONTINUED | OUTPATIENT
Start: 2019-01-10 | End: 2019-01-12 | Stop reason: HOSPADM

## 2019-01-10 RX ORDER — DILTIAZEM HYDROCHLORIDE 240 MG/1
240 CAPSULE, EXTENDED RELEASE ORAL DAILY
COMMUNITY

## 2019-01-10 RX ADMIN — SODIUM CHLORIDE: 4.5 INJECTION, SOLUTION INTRAVENOUS at 19:53

## 2019-01-10 RX ADMIN — METRONIDAZOLE 500 MG: 500 INJECTION, SOLUTION INTRAVENOUS at 15:26

## 2019-01-10 RX ADMIN — METRONIDAZOLE 500 MG: 500 INJECTION, SOLUTION INTRAVENOUS at 22:05

## 2019-01-10 RX ADMIN — LEVOFLOXACIN 500 MG: 5 INJECTION, SOLUTION INTRAVENOUS at 11:28

## 2019-01-10 RX ADMIN — SODIUM CHLORIDE: 4.5 INJECTION, SOLUTION INTRAVENOUS at 11:28

## 2019-01-10 ASSESSMENT — ENCOUNTER SYMPTOMS
COUGH: 0
DIARRHEA: 0
WHEEZING: 0
SHORTNESS OF BREATH: 0
ABDOMINAL PAIN: 1
SORE THROAT: 0
NAUSEA: 1
EYE REDNESS: 0

## 2019-01-11 PROCEDURE — 2500000003 HC RX 250 WO HCPCS: Performed by: INTERNAL MEDICINE

## 2019-01-11 PROCEDURE — 1210000000 HC MED SURG R&B

## 2019-01-11 PROCEDURE — 6360000002 HC RX W HCPCS: Performed by: INTERNAL MEDICINE

## 2019-01-11 RX ORDER — LEVOFLOXACIN 5 MG/ML
750 INJECTION, SOLUTION INTRAVENOUS
Status: DISCONTINUED | OUTPATIENT
Start: 2019-01-11 | End: 2019-01-12 | Stop reason: HOSPADM

## 2019-01-11 RX ADMIN — METRONIDAZOLE 500 MG: 500 INJECTION, SOLUTION INTRAVENOUS at 22:01

## 2019-01-11 RX ADMIN — METRONIDAZOLE 500 MG: 500 INJECTION, SOLUTION INTRAVENOUS at 14:37

## 2019-01-11 RX ADMIN — LEVOFLOXACIN 750 MG: 5 INJECTION, SOLUTION INTRAVENOUS at 12:18

## 2019-01-11 RX ADMIN — ONDANSETRON HYDROCHLORIDE 4 MG: 2 INJECTION, SOLUTION INTRAMUSCULAR; INTRAVENOUS at 14:40

## 2019-01-11 RX ADMIN — ENOXAPARIN SODIUM 30 MG: 30 INJECTION SUBCUTANEOUS at 12:18

## 2019-01-11 RX ADMIN — METRONIDAZOLE 500 MG: 500 INJECTION, SOLUTION INTRAVENOUS at 05:50

## 2019-01-12 VITALS
HEIGHT: 62 IN | WEIGHT: 113 LBS | HEART RATE: 93 BPM | TEMPERATURE: 97.5 F | DIASTOLIC BLOOD PRESSURE: 64 MMHG | RESPIRATION RATE: 20 BRPM | BODY MASS INDEX: 20.8 KG/M2 | OXYGEN SATURATION: 95 % | SYSTOLIC BLOOD PRESSURE: 149 MMHG

## 2019-01-12 LAB
ANION GAP SERPL CALCULATED.3IONS-SCNC: 21 MMOL/L (ref 7–19)
BUN BLDV-MCNC: 13 MG/DL (ref 8–23)
CALCIUM SERPL-MCNC: 8.9 MG/DL (ref 8.2–9.6)
CHLORIDE BLD-SCNC: 88 MMOL/L (ref 98–111)
CO2: 22 MMOL/L (ref 22–29)
CREAT SERPL-MCNC: 0.9 MG/DL (ref 0.5–0.9)
GFR NON-AFRICAN AMERICAN: 59
GLUCOSE BLD-MCNC: 60 MG/DL (ref 74–109)
HCT VFR BLD CALC: 40.7 % (ref 37–47)
HEMOGLOBIN: 13.9 G/DL (ref 12–16)
MCH RBC QN AUTO: 28.8 PG (ref 27–31)
MCHC RBC AUTO-ENTMCNC: 34.2 G/DL (ref 33–37)
MCV RBC AUTO: 84.4 FL (ref 81–99)
PDW BLD-RTO: 12.5 % (ref 11.5–14.5)
PLATELET # BLD: 224 K/UL (ref 130–400)
PMV BLD AUTO: 9.7 FL (ref 9.4–12.3)
POTASSIUM SERPL-SCNC: 3 MMOL/L (ref 3.5–5)
RBC # BLD: 4.82 M/UL (ref 4.2–5.4)
SODIUM BLD-SCNC: 131 MMOL/L (ref 136–145)
WBC # BLD: 9.5 K/UL (ref 4.8–10.8)

## 2019-01-12 PROCEDURE — 36415 COLL VENOUS BLD VENIPUNCTURE: CPT

## 2019-01-12 PROCEDURE — 2500000003 HC RX 250 WO HCPCS: Performed by: INTERNAL MEDICINE

## 2019-01-12 PROCEDURE — 85027 COMPLETE CBC AUTOMATED: CPT

## 2019-01-12 PROCEDURE — 80048 BASIC METABOLIC PNL TOTAL CA: CPT

## 2019-01-12 RX ORDER — METRONIDAZOLE 250 MG/1
250 TABLET ORAL 3 TIMES DAILY
Qty: 15 TABLET | Refills: 0 | Status: SHIPPED | OUTPATIENT
Start: 2019-01-12 | End: 2019-01-17

## 2019-01-12 RX ORDER — CIPROFLOXACIN 500 MG/1
250 TABLET, FILM COATED ORAL 2 TIMES DAILY
Qty: 5 TABLET | Refills: 0 | Status: SHIPPED | OUTPATIENT
Start: 2019-01-12 | End: 2019-01-17

## 2019-01-12 RX ADMIN — METRONIDAZOLE 500 MG: 500 INJECTION, SOLUTION INTRAVENOUS at 06:00

## 2019-01-12 ASSESSMENT — ENCOUNTER SYMPTOMS
ABDOMINAL PAIN: 1
NAUSEA: 1
VOMITING: 0
DIARRHEA: 0
SHORTNESS OF BREATH: 0
COUGH: 0

## 2019-01-15 ENCOUNTER — CARE COORDINATION (OUTPATIENT)
Dept: CASE MANAGEMENT | Age: 84
End: 2019-01-15

## 2019-01-18 ENCOUNTER — CARE COORDINATION (OUTPATIENT)
Dept: CASE MANAGEMENT | Age: 84
End: 2019-01-18

## 2019-01-22 ENCOUNTER — CARE COORDINATION (OUTPATIENT)
Dept: CASE MANAGEMENT | Age: 84
End: 2019-01-22

## 2019-04-24 ENCOUNTER — LAB (OUTPATIENT)
Dept: LAB | Facility: HOSPITAL | Age: 84
End: 2019-04-24

## 2019-04-24 ENCOUNTER — TRANSCRIBE ORDERS (OUTPATIENT)
Dept: ADMINISTRATIVE | Facility: HOSPITAL | Age: 84
End: 2019-04-24

## 2019-04-24 ENCOUNTER — HOSPITAL ENCOUNTER (OUTPATIENT)
Dept: CT IMAGING | Facility: HOSPITAL | Age: 84
Discharge: HOME | End: 2019-04-24
Admitting: PEDIATRICS

## 2019-04-24 DIAGNOSIS — R63.4 ABNORMAL WEIGHT LOSS: Primary | ICD-10-CM

## 2019-04-24 DIAGNOSIS — R19.7 DIARRHEA, UNSPECIFIED TYPE: ICD-10-CM

## 2019-04-24 DIAGNOSIS — R63.4 ABNORMAL WEIGHT LOSS: ICD-10-CM

## 2019-04-24 DIAGNOSIS — R53.83 OTHER FATIGUE: ICD-10-CM

## 2019-04-24 DIAGNOSIS — I49.9 CARDIAC ARRHYTHMIA, UNSPECIFIED CARDIAC ARRHYTHMIA TYPE: Primary | ICD-10-CM

## 2019-04-24 LAB
ALBUMIN SERPL-MCNC: 4.4 G/DL (ref 3.5–5)
ALBUMIN/GLOB SERPL: 1.2 G/DL (ref 1.1–2.5)
ALP SERPL-CCNC: 57 U/L (ref 24–120)
ALT SERPL W P-5'-P-CCNC: <15 U/L (ref 0–54)
ANION GAP SERPL CALCULATED.3IONS-SCNC: 14 MMOL/L (ref 4–13)
AST SERPL-CCNC: 25 U/L (ref 7–45)
AUTO MIXED CELLS #: 0.5 10*3/MM3 (ref 0.1–2.6)
AUTO MIXED CELLS %: 7.6 % (ref 0.1–24)
BACTERIA UR QL AUTO: ABNORMAL /HPF
BILIRUB SERPL-MCNC: 0.5 MG/DL (ref 0.1–1)
BILIRUB UR QL STRIP: NEGATIVE
BUN BLD-MCNC: 35 MG/DL (ref 5–21)
BUN/CREAT SERPL: 17.3
CALCIUM SPEC-SCNC: 9.8 MG/DL (ref 8.4–10.4)
CHLORIDE SERPL-SCNC: 90 MMOL/L (ref 98–110)
CLARITY UR: CLEAR
CO2 SERPL-SCNC: 30 MMOL/L (ref 24–31)
COLOR UR: YELLOW
CREAT BLD-MCNC: 2.02 MG/DL (ref 0.5–1.4)
CREAT BLDA-MCNC: 2.1 MG/DL (ref 0.6–1.3)
ERYTHROCYTE [DISTWIDTH] IN BLOOD BY AUTOMATED COUNT: 12.8 % (ref 12–15)
GFR SERPL CREATININE-BSD FRML MDRD: 23 ML/MIN/1.73
GLOBULIN UR ELPH-MCNC: 3.8 GM/DL
GLUCOSE BLD-MCNC: 98 MG/DL (ref 70–100)
GLUCOSE UR STRIP-MCNC: NEGATIVE MG/DL
HCT VFR BLD AUTO: 34.6 % (ref 37–47)
HGB BLD-MCNC: 12 G/DL (ref 12–16)
HGB UR QL STRIP.AUTO: NEGATIVE
HYALINE CASTS UR QL AUTO: ABNORMAL /LPF
KETONES UR QL STRIP: NEGATIVE
LEUKOCYTE ESTERASE UR QL STRIP.AUTO: ABNORMAL
LYMPHOCYTES # BLD AUTO: 1.8 10*3/MM3 (ref 0.7–3.1)
LYMPHOCYTES NFR BLD AUTO: 28.3 % (ref 15–45)
MCH RBC QN AUTO: 31 PG (ref 28–32)
MCHC RBC AUTO-ENTMCNC: 34.7 G/DL (ref 33–36)
MCV RBC AUTO: 89.4 FL (ref 82–98)
NEUTROPHILS # BLD AUTO: 3.9 10*3/MM3 (ref 1.5–8.3)
NEUTROPHILS NFR BLD AUTO: 64.1 % (ref 39–78)
NITRITE UR QL STRIP: NEGATIVE
PH UR STRIP.AUTO: 7 [PH] (ref 5–8)
PLATELET # BLD AUTO: 247 10*3/MM3 (ref 130–400)
PMV BLD AUTO: 8.6 FL (ref 6–12)
POTASSIUM BLD-SCNC: 3.7 MMOL/L (ref 3.5–5.3)
PROT SERPL-MCNC: 8.2 G/DL (ref 6.3–8.7)
PROT UR QL STRIP: ABNORMAL
RBC # BLD AUTO: 3.87 10*6/MM3 (ref 4.2–5.4)
RBC # UR: ABNORMAL /HPF
REF LAB TEST METHOD: ABNORMAL
SODIUM BLD-SCNC: 134 MMOL/L (ref 135–145)
SP GR UR STRIP: 1.01 (ref 1–1.03)
SQUAMOUS #/AREA URNS HPF: ABNORMAL /HPF
UROBILINOGEN UR QL STRIP: ABNORMAL
WBC NRBC COR # BLD: 6.2 10*3/MM3 (ref 4.8–10.8)
WBC UR QL AUTO: ABNORMAL /HPF

## 2019-04-24 PROCEDURE — 81001 URINALYSIS AUTO W/SCOPE: CPT

## 2019-04-24 PROCEDURE — 82565 ASSAY OF CREATININE: CPT

## 2019-04-24 PROCEDURE — 36415 COLL VENOUS BLD VENIPUNCTURE: CPT

## 2019-04-24 PROCEDURE — 74176 CT ABD & PELVIS W/O CONTRAST: CPT

## 2019-04-24 PROCEDURE — 85025 COMPLETE CBC W/AUTO DIFF WBC: CPT

## 2019-04-24 PROCEDURE — 80053 COMPREHEN METABOLIC PANEL: CPT

## 2019-04-24 PROCEDURE — 0 IOHEXOL 300 MG/ML SOLUTION: Performed by: PEDIATRICS

## 2019-04-24 PROCEDURE — 87086 URINE CULTURE/COLONY COUNT: CPT | Performed by: PEDIATRICS

## 2019-04-24 RX ORDER — SPIRONOLACTONE 25 MG/1
25 TABLET ORAL 2 TIMES DAILY
COMMUNITY
End: 2020-12-02

## 2019-04-24 RX ORDER — MECLIZINE HYDROCHLORIDE 25 MG/1
25 TABLET ORAL 3 TIMES DAILY PRN
COMMUNITY
End: 2019-05-07

## 2019-04-24 RX ORDER — CLONIDINE HYDROCHLORIDE 0.1 MG/1
0.1 TABLET ORAL 2 TIMES DAILY
COMMUNITY
End: 2021-03-01

## 2019-04-24 RX ORDER — DILTIAZEM HYDROCHLORIDE 240 MG/1
240 CAPSULE, COATED, EXTENDED RELEASE ORAL DAILY
COMMUNITY
End: 2021-03-12 | Stop reason: SDUPTHER

## 2019-04-24 RX ORDER — LORAZEPAM 0.5 MG/1
0.5 TABLET ORAL EVERY 8 HOURS PRN
COMMUNITY
End: 2020-02-25 | Stop reason: SDUPTHER

## 2019-04-24 RX ORDER — CITALOPRAM 10 MG/1
10 TABLET ORAL DAILY
COMMUNITY
End: 2020-06-24 | Stop reason: SDUPTHER

## 2019-04-24 RX ORDER — PANTOPRAZOLE SODIUM 40 MG/1
40 TABLET, DELAYED RELEASE ORAL DAILY
COMMUNITY
End: 2021-03-12 | Stop reason: SDUPTHER

## 2019-04-24 RX ORDER — CHLORTHALIDONE 25 MG/1
25 TABLET ORAL 2 TIMES DAILY
COMMUNITY
End: 2020-12-02

## 2019-04-24 RX ADMIN — IOHEXOL 50 ML: 300 INJECTION, SOLUTION INTRAVENOUS at 11:34

## 2019-04-25 ENCOUNTER — HOSPITAL ENCOUNTER (OUTPATIENT)
Dept: CARDIOLOGY | Facility: HOSPITAL | Age: 84
Discharge: HOME | End: 2019-04-25
Admitting: PEDIATRICS

## 2019-04-25 DIAGNOSIS — I49.9 CARDIAC ARRHYTHMIA, UNSPECIFIED CARDIAC ARRHYTHMIA TYPE: ICD-10-CM

## 2019-04-25 PROCEDURE — 87077 CULTURE AEROBIC IDENTIFY: CPT | Performed by: PEDIATRICS

## 2019-04-25 PROCEDURE — 87899 AGENT NOS ASSAY W/OPTIC: CPT | Performed by: PEDIATRICS

## 2019-04-25 PROCEDURE — 87046 STOOL CULTR AEROBIC BACT EA: CPT | Performed by: PEDIATRICS

## 2019-04-25 PROCEDURE — 87045 FECES CULTURE AEROBIC BACT: CPT | Performed by: PEDIATRICS

## 2019-04-25 PROCEDURE — 93226 XTRNL ECG REC<48 HR SCAN A/R: CPT

## 2019-04-25 PROCEDURE — 93225 XTRNL ECG REC<48 HRS REC: CPT

## 2019-04-26 LAB — BACTERIA SPEC AEROBE CULT: ABNORMAL

## 2019-04-28 LAB
BACTERIA SPEC AEROBE CULT: NORMAL
E COLI SXT1 STL QL IA: NEGATIVE
E COLI SXT2 STL QL IA: NEGATIVE

## 2019-04-30 ENCOUNTER — OFFICE VISIT (OUTPATIENT)
Dept: GASTROENTEROLOGY | Facility: CLINIC | Age: 84
End: 2019-04-30

## 2019-04-30 VITALS
TEMPERATURE: 96.1 F | WEIGHT: 100 LBS | OXYGEN SATURATION: 98 % | BODY MASS INDEX: 17.72 KG/M2 | SYSTOLIC BLOOD PRESSURE: 164 MMHG | HEIGHT: 63 IN | HEART RATE: 108 BPM | DIASTOLIC BLOOD PRESSURE: 86 MMHG

## 2019-04-30 DIAGNOSIS — R63.4 WEIGHT LOSS: Primary | ICD-10-CM

## 2019-04-30 DIAGNOSIS — I10 ESSENTIAL HYPERTENSION: ICD-10-CM

## 2019-04-30 DIAGNOSIS — R11.2 NAUSEA AND VOMITING, INTRACTABILITY OF VOMITING NOT SPECIFIED, UNSPECIFIED VOMITING TYPE: ICD-10-CM

## 2019-04-30 DIAGNOSIS — R93.5 ABNORMAL CT OF THE ABDOMEN: ICD-10-CM

## 2019-04-30 PROCEDURE — 99204 OFFICE O/P NEW MOD 45 MIN: CPT | Performed by: NURSE PRACTITIONER

## 2019-04-30 NOTE — PROGRESS NOTES
[facility] GASTROENTEROLOGY - OFFICE NOTE    4/30/2019    Sabiha Sherwood   4/25/1927    Primary Physician: Sb Bennett MD    Chief Complaint   Patient presents with   • Weight Loss   abn ct       HISTORY OF PRESENT ILLNESS    Sabiha Sherwood is a 89 y.o. female presents with weight loss and abn ct abdomen/pelvis.       Has lost 40 #/ 6 weeks. Appetite is decreased due to n/v during this time. N/v is immediate with eating. This occurs daily. No new meds prior to symptoms.  No abdominal pain. No dysphagia. No fever. No hematemesis.     Has taken protonix x 1 month. Has not helped.  No acid reflux symptoms such as heartburn or regurgitation.     Has bm daily. No constipation or diarrhea. No rectal bleeding.     No family history of colon cancer or polyps.   She has never had a colonoscopy or egd.       Ct abdomen/pelvis with/o contrast 4-24-19    IMPRESSION:  1. No IV contrast administered due to diminished renal function.  2. Moderate stool seen within the colon. No evidence of bowel  obstruction. There is apparent wall thickening of the cecum adjacent to  the ileocecal valve region which might be artifactual, however  infectious/inflammatory even neoplastic changes are considered.  Correlation to any recent colonoscopy recommended.  3. Small hiatal hernia. Underdistended stomach. Wall thickening of the  distal stomach could also be artifactual, however gastritis is  considered. No free air or abscess.  4. Dense vascular calcification with no aneurysm or dissection  5. Minimal prominence of the proximal renal collecting systems with  normal caliber ureters. This could be incidental and may represent mild  UPJ stenosis. There is no obstructing ureteral stone or mass identified.  Bladder is moderately distended.    Past Medical History:   Diagnosis Date   • Acute renal failure syndrome (CMS/HCC)    • Arrhythmia    • Diverticulitis    • Hiatal hernia    • Hypertension    • UTI (urinary tract infection)    •  Vertigo        Past Surgical History:   Procedure Laterality Date   • CHOLECYSTECTOMY     • HYSTERECTOMY         Outpatient Medications Marked as Taking for the 4/30/19 encounter (Office Visit) with Sendy Carr APRN   Medication Sig Dispense Refill   • chlorthalidone (HYGROTON) 25 MG tablet Take 25 mg by mouth Daily.     • citalopram (CeleXA) 10 MG tablet Take 10 mg by mouth Daily.     • CloNIDine (CATAPRES) 0.1 MG tablet Take 0.1 mg by mouth 2 (Two) Times a Day.     • diltiaZEM CD (CARDIZEM CD) 240 MG 24 hr capsule Take 240 mg by mouth Daily.     • LORazepam (ATIVAN) 0.5 MG tablet Take 0.5 mg by mouth Every 8 (Eight) Hours As Needed for Anxiety.     • meclizine (ANTIVERT) 25 MG tablet Take 25 mg by mouth 3 (Three) Times a Day As Needed for dizziness.     • pantoprazole (PROTONIX) 40 MG EC tablet Take 40 mg by mouth Daily.     • spironolactone (ALDACTONE) 25 MG tablet Take 25 mg by mouth Daily.         Allergies   Allergen Reactions   • Codeine Other (See Comments)     shakes       Social History     Socioeconomic History   • Marital status:      Spouse name: Not on file   • Number of children: Not on file   • Years of education: Not on file   • Highest education level: Not on file   Tobacco Use   • Smoking status: Never Smoker   • Smokeless tobacco: Never Used   Substance and Sexual Activity   • Alcohol use: No     Frequency: Never   • Drug use: No   • Sexual activity: Defer       Family History   Problem Relation Age of Onset   • Colon cancer Neg Hx    • Colon polyps Neg Hx        Review of Systems   Constitutional: Positive for unexpected weight change. Negative for appetite change, chills, fatigue and fever.   HENT: Negative for sore throat and trouble swallowing.    Eyes: Negative for visual disturbance.   Respiratory: Negative for cough, chest tightness, shortness of breath and wheezing.    Cardiovascular: Negative for chest pain and palpitations.   Gastrointestinal: Positive for nausea and  "vomiting. Negative for abdominal distention, abdominal pain, anal bleeding, blood in stool, constipation and diarrhea.        As mentioned in hpi   Genitourinary: Negative for difficulty urinating and hematuria.   Musculoskeletal: Negative for arthralgias and back pain.   Skin: Negative for color change and rash.   Neurological: Negative for dizziness, seizures, syncope, light-headedness and headaches.   Hematological: Negative for adenopathy.   Psychiatric/Behavioral: Negative for confusion. The patient is not nervous/anxious.         Vitals:    04/30/19 0817   BP: 164/86   Pulse: 108   Temp: 96.1 °F (35.6 °C)   SpO2: 98%   Weight: 45.4 kg (100 lb)   Height: 160 cm (63\")      Body mass index is 17.71 kg/m².    Physical Exam   Constitutional: She appears well-developed and well-nourished. No distress.   HENT:   Head: Normocephalic and atraumatic.   Eyes: EOM are normal. No scleral icterus.   Neck: Neck supple. No JVD present.   Cardiovascular: Normal rate, regular rhythm and normal heart sounds.   Pulmonary/Chest: Effort normal and breath sounds normal.   Abdominal: Soft. Bowel sounds are normal. She exhibits no distension. There is tenderness (mild mid lower abdomen).   Musculoskeletal: Normal range of motion. She exhibits no deformity.   Neurological: She is alert.   Skin: Skin is warm and dry. No rash noted.   Psychiatric: She has a normal mood and affect. Her behavior is normal.   Vitals reviewed.      Results for orders placed or performed during the hospital encounter of 04/24/19   POC Creatinine   Result Value Ref Range    Creatinine 2.10 (H) 0.60 - 1.30 mg/dL           ASSESSMENT AND PLAN    Assessment/Plan     Sabiha was seen today for weight loss.    Diagnoses and all orders for this visit:    Weight loss  -     Case Request; Standing  -     Case Request    Nausea and vomiting, intractability of vomiting not specified, unspecified vomiting type  -     Case Request; Standing  -     Case Request    Abnormal " CT of the abdomen  -     Case Request; Standing  -     Case Request    Essential hypertension    Other orders  -     Follow Anesthesia Guidelines / Standing Orders; Future  -     Implement Anesthesia Orders Day of Procedure; Standing  -     Obtain Informed Consent; Standing  -     Obtain Informed Consent; Future  -     polyethylene glycol (GOLYTELY) 236 g solution; Take 4,000 mL by mouth 1 (One) Time for 1 dose. Take as directed per instruction sheet.    Differential diagnosis discussed in regards to abnormal CT scan abdomen and pelvis.  Included differential would be for malignancy.  We discussed colonoscopy.  We discussed risk of the colonoscopy including perforation.  She verbalized understanding and wishes to proceed with colonoscopy at this time.  We also discussed upper endoscopy due recommend continue Protonix on a daily basis.  Recommend emergency room for worsening symptoms.  To nausea vomiting.  She is in agreement for her endoscopy as well.    The patient was advised to take any blood pressure or heart  medications the morning of  procedure if that is when he/she normally takes.            ESOPHAGOGASTRODUODENOSCOPY WITH ANESTHESIA (N/A), COLONOSCOPY WITH ANESTHESIA (N/A)   Risk, benefits, and alternatives of endoscopy were explained in full.  They understand that there is a risk of bleeding, perforation, and infection.  The risk of perforation goes up with esophageal dilation.  Other options to evaluate UGI complaints could involve barium swallow or UGI series, but these would be diagnostic tests only.  Patient was given time to ask questions.  I answered them to their satisfaction and they are agreeable to proceedingAll risks, benefits, alternatives, and indications of colonoscopy procedure have been discussed with the patient. Risks to include perforation of the colon requiring possible surgery or colostomy, risk of bleeding from biopsies or removal of colon tissue, possibility of missing a colon polyp  or cancer, or adverse drug reaction.  Benefits to include the diagnosis and management of disease of the colon and rectum. Alternatives to include barium enema, radiographic evaluation, lab testing or no intervention. Pt verbalizes understanding and agrees.              Body mass index is 17.71 kg/m².    Patient's Body mass index is 17.71 kg/m². BMI is below normal parameters. Recommendations include: continue to f/u with [facility] .           CEDRIC Yadav Dragon/transcription disclaimer:  Much of this encounter note is electronic transcription/translation of spoken language to printed text.  The electronic translation of spoken language may be erroneous, or at times, nonsensical words or phrases may be inadvertently transcribed.  Although I have reviewed the note for such errors, some may still exist.

## 2019-05-02 ENCOUNTER — HOSPITAL ENCOUNTER (OUTPATIENT)
Dept: ULTRASOUND IMAGING | Age: 84
Discharge: HOME | End: 2019-05-02
Payer: MEDICARE

## 2019-05-02 DIAGNOSIS — N18.4 CHRONIC KIDNEY DISEASE, STAGE 4 (SEVERE) (HCC): ICD-10-CM

## 2019-05-02 PROCEDURE — 76770 US EXAM ABDO BACK WALL COMP: CPT

## 2019-05-02 PROCEDURE — 93227 XTRNL ECG REC<48 HR R&I: CPT | Performed by: INTERNAL MEDICINE

## 2019-05-07 ENCOUNTER — OFFICE VISIT (OUTPATIENT)
Dept: CARDIOLOGY | Facility: CLINIC | Age: 84
End: 2019-05-07

## 2019-05-07 VITALS
HEIGHT: 63 IN | DIASTOLIC BLOOD PRESSURE: 60 MMHG | SYSTOLIC BLOOD PRESSURE: 112 MMHG | OXYGEN SATURATION: 98 % | BODY MASS INDEX: 18.25 KG/M2 | WEIGHT: 103 LBS | HEART RATE: 81 BPM

## 2019-05-07 DIAGNOSIS — I10 ESSENTIAL HYPERTENSION: ICD-10-CM

## 2019-05-07 DIAGNOSIS — R00.2 PALPITATIONS: Primary | ICD-10-CM

## 2019-05-07 PROCEDURE — 93000 ELECTROCARDIOGRAM COMPLETE: CPT | Performed by: INTERNAL MEDICINE

## 2019-05-07 PROCEDURE — 99204 OFFICE O/P NEW MOD 45 MIN: CPT | Performed by: INTERNAL MEDICINE

## 2019-05-07 RX ORDER — ASPIRIN 81 MG/1
81 TABLET ORAL DAILY
COMMUNITY
End: 2020-12-02

## 2019-05-07 RX ORDER — POTASSIUM CHLORIDE 750 MG/1
10 TABLET, FILM COATED, EXTENDED RELEASE ORAL DAILY
COMMUNITY
End: 2020-10-16 | Stop reason: SDUPTHER

## 2019-05-07 NOTE — PROGRESS NOTES
Subjective:     Encounter Date:05/07/2019      Patient ID: Sabiha Sherwood is a 89 y.o. female with history of hypertension and intermittent palpitations who is referred here to establish care.    Referring Provider: Sb Bennett MD  Reason for referral: Arrhythmia    Chief Complaint: Establish care    History of Present Illness     This a 89-year-old female with history of hypertension and palpitations, referred here to establish care.  Patient says that around the first of the year, she was having increasing episodes of palpitations.  She relates this to an increase in anxiety.  She says that she was given some Ativan and that her symptoms have largely resolved since that time.  She did discuss this with her primary care provider and there was some concern about a possible arrhythmia.  A Holter monitor was ordered.  This is referenced below.  Recently, the patient denies any significant symptoms, including no palpitations.  She says that over her lifetime, she has had a history of intermittent palpitations and previously followed with cardiology at [facility] for this.  She denies ever knowingly having any type of cardiac rhythm abnormality, however.  She denies any history of atrial fibrillation, SVT.  Presently, no chest pain, shortness of breath or dyspnea on exertion.  No lightheadedness, dizziness, syncope.  She reports good control of her blood pressure no side effects from any of her medications.  She did lose quite a bit of weight around the first of the year.  She says that she was simply not eating due to anxiety.  More recently, she has gained a few pounds back.  Her appetite has improved.    The following portions of the patient's history were reviewed and updated as appropriate: allergies, current medications, past family history, past medical history, past social history, past surgical history and problem list.     Past Medical History:   Diagnosis Date   • Acute renal failure syndrome  (CMS/HCC)    • Arrhythmia    • Diverticulitis    • Hiatal hernia    • Hypertension    • UTI (urinary tract infection)    • Vertigo      Past Surgical History:   Procedure Laterality Date   • CHOLECYSTECTOMY     • COLONOSCOPY N/A 5/10/2019    Procedure: COLONOSCOPY WITH ANESTHESIA;  Surgeon: Steven Bassett MD;  [facility];  Service: Gastroenterology   • ENDOSCOPY N/A 5/10/2019    Procedure: ESOPHAGOGASTRODUODENOSCOPY WITH ANESTHESIA;  Surgeon: Steven Bassett MD;  Location[facility] END[facility];  Service: Gastroenterology   • HYSTERECTOMY         Current Outpatient Medications:   •  aspirin 81 MG EC tablet, Take 81 mg by mouth Daily., Disp: , Rfl:   •  chlorthalidone (HYGROTON) 25 MG tablet, Take 25 mg by mouth 2 (Two) Times a Day., Disp: , Rfl:   •  citalopram (CeleXA) 10 MG tablet, Take 10 mg by mouth Daily., Disp: , Rfl:   •  CloNIDine (CATAPRES) 0.1 MG tablet, Take 0.1 mg by mouth 2 (Two) Times a Day., Disp: , Rfl:   •  diltiaZEM CD (CARDIZEM CD) 240 MG 24 hr capsule, Take 240 mg by mouth Daily., Disp: , Rfl:   •  LORazepam (ATIVAN) 0.5 MG tablet, Take 0.5 mg by mouth Every 8 (Eight) Hours As Needed for Anxiety., Disp: , Rfl:   •  pantoprazole (PROTONIX) 40 MG EC tablet, Take 40 mg by mouth Daily., Disp: , Rfl:   •  potassium chloride (K-DUR) 10 MEQ CR tablet, Take 10 mEq by mouth Daily., Disp: , Rfl:   •  spironolactone (ALDACTONE) 25 MG tablet, Take 25 mg by mouth 2 (Two) Times a Day., Disp: , Rfl:   •  Calcium Carbonate-Vitamin D (CALTRATE 600+D PO), Take 1 tablet by mouth Daily., Disp: , Rfl:   •  Multiple Vitamins-Minerals (MULTIPLE VITAMINS/WOMENS PO), Take 1 tablet by mouth Daily., Disp: , Rfl:     Allergies   Allergen Reactions   • Codeine Hives     shakes     Social History     Tobacco Use   • Smoking status: Never Smoker   • Smokeless tobacco: Never Used   Substance Use Topics   • Alcohol use: No     Frequency: Never     Family History   Problem Relation Age of Onset   • Hypertension  Mother    • Colon cancer Neg Hx    • Colon polyps Neg Hx      Review of Systems   Constitution: Positive for malaise/fatigue and weight loss. Negative for chills, fever and night sweats.   HENT: Negative for congestion and hearing loss.    Eyes: Negative for blurred vision and pain.   Cardiovascular: Positive for palpitations. Negative for chest pain, claudication, dyspnea on exertion, leg swelling, orthopnea, paroxysmal nocturnal dyspnea and syncope.   Respiratory: Negative for cough, hemoptysis, shortness of breath and wheezing.    Endocrine: Negative for cold intolerance, heat intolerance, polydipsia and polyuria.   Hematologic/Lymphatic: Negative for adenopathy and bleeding problem. Does not bruise/bleed easily.   Skin: Negative for color change, poor wound healing and rash.   Musculoskeletal: Negative for arthritis, back pain, joint pain, joint swelling, myalgias and neck pain.   Gastrointestinal: Negative for abdominal pain, change in bowel habit, constipation, diarrhea, heartburn, hematochezia, melena, nausea and vomiting.   Genitourinary: Negative for dysuria, frequency, hematuria and nocturia.   Neurological: Negative for dizziness, focal weakness, headaches, light-headedness, loss of balance and numbness.   Psychiatric/Behavioral: Negative for altered mental status, memory loss and substance abuse. The patient is nervous/anxious.    Allergic/Immunologic: Negative for hives and persistent infections.       ECG 12 Lead  Date/Time: 5/7/2019 1:40 PM  Performed by: Jorge Luis Rangel MD  Authorized by: Jorge Luis Rangel MD   Comparison: compared with previous ECG   Similar to previous ECG  Rhythm: sinus rhythm  Rate: normal  BPM: 74  Conduction: conduction normal  QRS axis: normal  Other findings: non-specific ST-T wave changes    Clinical impression: non-specific ECG             Objective:     Physical Exam   Constitutional: She is oriented to person, place, and time. Vital signs are normal. She  appears well-developed and well-nourished. She is cooperative.  Non-toxic appearance. No distress.   HENT:   Head: Normocephalic and atraumatic.   Right Ear: External ear normal.   Left Ear: External ear normal.   Nose: Nose normal.   Mouth/Throat: Uvula is midline, oropharynx is clear and moist and mucous membranes are normal. Mucous membranes are not pale, not dry and not cyanotic. No oropharyngeal exudate.   Eyes: EOM and lids are normal. Pupils are equal, round, and reactive to light.   Neck: Normal range of motion. Neck supple. No hepatojugular reflux and no JVD present. Carotid bruit is not present. No tracheal deviation and no edema present. No thyroid mass and no thyromegaly present.   Cardiovascular: Normal rate, regular rhythm, S1 normal, S2 normal, normal heart sounds, intact distal pulses and normal pulses.  No extrasystoles are present. PMI is not displaced. Exam reveals no gallop and no friction rub.   No murmur heard.  Pulses:       Radial pulses are 2+ on the right side, and 2+ on the left side.        Femoral pulses are 2+ on the right side, and 2+ on the left side.       Dorsalis pedis pulses are 2+ on the right side, and 2+ on the left side.        Posterior tibial pulses are 2+ on the right side, and 2+ on the left side.   Pulmonary/Chest: Effort normal and breath sounds normal. No accessory muscle usage. No respiratory distress. She has no wheezes. She has no rales. She exhibits no tenderness.   Abdominal: Soft. Normal appearance and bowel sounds are normal. She exhibits no distension, no abdominal bruit and no pulsatile midline mass. There is no hepatosplenomegaly. There is no tenderness.   Musculoskeletal: Normal range of motion. She exhibits no edema, tenderness or deformity.   Lymphadenopathy:     She has no cervical adenopathy.   Neurological: She is oriented to person, place, and time. She has normal strength. No cranial nerve deficit.   Skin: Skin is warm, dry and intact. No rash noted.  "She is not diaphoretic. No cyanosis or erythema. Nails show no clubbing.   Psychiatric: She has a normal mood and affect. Her speech is normal and behavior is normal. Thought content normal.   Vitals reviewed.    /60 (BP Location: Left arm, Patient Position: Sitting)   Pulse 81   Ht 160 cm (63\")   Wt 46.7 kg (103 lb)   SpO2 98%   BMI 18.25 kg/m²     Data/Lab Review:     Echo 11/15/2016:   Mitral valve leaflets are mildly thickened with preserved leaflet mobility.   Mild to moderate mitral regurgitation is present.   Mildly thickened aortic valve leaflets with preserved leaflet mobility.   Mild-to-moderate aortic regurgitation is noted.   Normal left ventricular size with preserved LV function and an estimated   ejection fraction of approximately 55-60%.   No evidence of left ventricular mass or thrombus noted.   E/A flow reversal noted. Suggestive of diastolic dysfunction.   Mild concentric left ventricular hypertrophy.    Holter 4/25/2019:  · A relatively benign monitor study.  · The predominant rhythm noted during the testing period was sinus rhythm.  · Average HR: 83. Min HR: 60. Max HR: 127.  · Premature atrial contractions occured rarely. (47 isolated PACs).  · Premature ventricular contractions occured rarely. (54 isolated PVCs).  · No symptoms reported during the monitoring period.      Assessment:          Diagnosis Plan   1. Palpitations  ECG 12 Lead   2. Essential hypertension            Plan:       1.  Palpitations: The patient's most recent Holter monitor is referenced above.  No prolonged rhythm abnormalities were identified.  She has minimal symptoms at this time, in fact she is not having symptoms at this time.  We will continue watchful waiting however we did discuss that we could place her in a longer monitor if she has any recurrent symptoms.    2.  Essential hypertension: The patient's blood pressure is currently well controlled.  We will continue current medications.    Patient's Body " mass index is 18.25 kg/m². BMI is below normal parameters. Recommendations include: treating the underlying disease process.  The patient states that her weight loss and lack of appetite was due to uncontrolled anxiety which is much better now when she has improved appetite and is gaining some weight.    Follow-up: 6 months unless otherwise needed sooner

## 2019-05-10 ENCOUNTER — ANESTHESIA EVENT (OUTPATIENT)
Dept: GASTROENTEROLOGY | Facility: HOSPITAL | Age: 84
End: 2019-05-10

## 2019-05-10 ENCOUNTER — HOSPITAL ENCOUNTER (OUTPATIENT)
Facility: HOSPITAL | Age: 84
Setting detail: HOSPITAL OUTPATIENT SURGERY
Discharge: HOME | End: 2019-05-10
Attending: INTERNAL MEDICINE | Admitting: INTERNAL MEDICINE

## 2019-05-10 ENCOUNTER — ANESTHESIA (OUTPATIENT)
Dept: GASTROENTEROLOGY | Facility: HOSPITAL | Age: 84
End: 2019-05-10

## 2019-05-10 VITALS
DIASTOLIC BLOOD PRESSURE: 56 MMHG | HEIGHT: 63 IN | TEMPERATURE: 97.5 F | BODY MASS INDEX: 18.07 KG/M2 | SYSTOLIC BLOOD PRESSURE: 145 MMHG | RESPIRATION RATE: 15 BRPM | HEART RATE: 88 BPM | WEIGHT: 102 LBS | OXYGEN SATURATION: 99 %

## 2019-05-10 PROCEDURE — 45378 DIAGNOSTIC COLONOSCOPY: CPT | Performed by: INTERNAL MEDICINE

## 2019-05-10 PROCEDURE — 25010000002 PROPOFOL 10 MG/ML EMULSION: Performed by: NURSE ANESTHETIST, CERTIFIED REGISTERED

## 2019-05-10 PROCEDURE — 43235 EGD DIAGNOSTIC BRUSH WASH: CPT | Performed by: INTERNAL MEDICINE

## 2019-05-10 RX ORDER — SODIUM CHLORIDE 0.9 % (FLUSH) 0.9 %
3-10 SYRINGE (ML) INJECTION AS NEEDED
Status: DISCONTINUED | OUTPATIENT
Start: 2019-05-10 | End: 2019-05-10 | Stop reason: HOSPADM

## 2019-05-10 RX ORDER — SODIUM CHLORIDE 9 MG/ML
100 INJECTION, SOLUTION INTRAVENOUS CONTINUOUS
Status: DISCONTINUED | OUTPATIENT
Start: 2019-05-10 | End: 2019-05-10 | Stop reason: HOSPADM

## 2019-05-10 RX ORDER — ONDANSETRON 2 MG/ML
4 INJECTION INTRAMUSCULAR; INTRAVENOUS ONCE AS NEEDED
Status: DISCONTINUED | OUTPATIENT
Start: 2019-05-10 | End: 2019-05-10 | Stop reason: HOSPADM

## 2019-05-10 RX ORDER — SODIUM CHLORIDE 0.9 % (FLUSH) 0.9 %
3 SYRINGE (ML) INJECTION EVERY 12 HOURS SCHEDULED
Status: DISCONTINUED | OUTPATIENT
Start: 2019-05-10 | End: 2019-05-10 | Stop reason: HOSPADM

## 2019-05-10 RX ORDER — SODIUM CHLORIDE 0.9 % (FLUSH) 0.9 %
3 SYRINGE (ML) INJECTION AS NEEDED
Status: CANCELLED | OUTPATIENT
Start: 2019-05-10

## 2019-05-10 RX ORDER — LIDOCAINE HYDROCHLORIDE 20 MG/ML
INJECTION, SOLUTION INFILTRATION; PERINEURAL AS NEEDED
Status: DISCONTINUED | OUTPATIENT
Start: 2019-05-10 | End: 2019-05-10 | Stop reason: SURG

## 2019-05-10 RX ORDER — SODIUM CHLORIDE 9 MG/ML
500 INJECTION, SOLUTION INTRAVENOUS CONTINUOUS PRN
Status: CANCELLED | OUTPATIENT
Start: 2019-05-10

## 2019-05-10 RX ORDER — PROPOFOL 10 MG/ML
VIAL (ML) INTRAVENOUS AS NEEDED
Status: DISCONTINUED | OUTPATIENT
Start: 2019-05-10 | End: 2019-05-10 | Stop reason: SURG

## 2019-05-10 RX ADMIN — PROPOFOL 20 MG: 10 INJECTION, EMULSION INTRAVENOUS at 11:55

## 2019-05-10 RX ADMIN — SODIUM CHLORIDE 100 ML/HR: 9 INJECTION, SOLUTION INTRAVENOUS at 10:36

## 2019-05-10 RX ADMIN — LIDOCAINE HYDROCHLORIDE 100 MG: 20 INJECTION, SOLUTION INFILTRATION; PERINEURAL at 11:48

## 2019-05-10 RX ADMIN — PROPOFOL 80 MG: 10 INJECTION, EMULSION INTRAVENOUS at 11:48

## 2019-05-10 NOTE — ANESTHESIA POSTPROCEDURE EVALUATION
"Patient: Sabiha Sherwood    Procedure Summary     Date:  05/10/19 Room / Location:  [facility] ENDOSCOPY 4 / [facility] ENDOSCOPY    Anesthesia Start:  1147 Anesthesia Stop:  1210    Procedures:       ESOPHAGOGASTRODUODENOSCOPY WITH ANESTHESIA (N/A )      COLONOSCOPY WITH ANESTHESIA (N/A ) Diagnosis:       Weight loss      Nausea and vomiting, intractability of vomiting not specified, unspecified vomiting type      Abnormal CT of the abdomen      (Weight loss [R63.4])      (Nausea and vomiting, intractability of vomiting not specified, unspecified vomiting type [R11.2])      (Abnormal CT of the abdomen [R93.5])    Surgeon:  Steven Bassett MD Provider:  Molina Camarena CRNA    Anesthesia Type:  MAC ASA Status:  2          Anesthesia Type: MAC  Last vitals  BP   (!) 194/76 (05/10/19 1005)   Temp   97.5 °F (36.4 °C) (05/10/19 1005)   Pulse   105 (05/10/19 1005)   Resp   20 (05/10/19 1005)     SpO2   94 % (05/10/19 1005)     Post Anesthesia Care and Evaluation    Patient location during evaluation: PHASE II  Patient participation: complete - patient participated  Level of consciousness: awake and alert  Pain management: adequate  Airway patency: patent  Anesthetic complications: No anesthetic complications    Cardiovascular status: acceptable  Respiratory status: acceptable  Hydration status: acceptable    Comments: Blood pressure (!) 194/76, pulse 105, temperature 97.5 °F (36.4 °C), temperature source Temporal, resp. rate 20, height 160 cm (63\"), weight 46.3 kg (102 lb), SpO2 94 %.    Pt discharged from PACU based on jeramie score >8      "

## 2019-05-10 NOTE — ANESTHESIA PREPROCEDURE EVALUATION
Anesthesia Evaluation     Patient summary reviewed and Nursing notes reviewed   NPO Solid Status: > 8 hours  NPO Liquid Status: > 4 hours           Airway   Mallampati: I  TM distance: >3 FB  Neck ROM: full  No difficulty expected  Dental - normal exam     Pulmonary - negative pulmonary ROS and normal exam   Cardiovascular - normal exam  Exercise tolerance: good (4-7 METS)    Patient on routine beta blocker and Beta blocker given within 24 hours of surgery    (+) hypertension well controlled 2 medications or greater,       Neuro/Psych  (+) dizziness/light headedness,     GI/Hepatic/Renal/Endo    (+)  hiatal hernia,      Musculoskeletal (-) negative ROS    Abdominal  - normal exam   Substance History      OB/GYN          Other - negative ROS                       Anesthesia Plan    ASA 2     MAC   total IV anesthesia  intravenous induction   Anesthetic plan, all risks, benefits, and alternatives have been provided, discussed and informed consent has been obtained with: patient and spouse/significant other.

## 2019-05-14 PROBLEM — I10 HYPERTENSION: Status: ACTIVE | Noted: 2019-05-14

## 2019-05-14 PROBLEM — R00.2 PALPITATIONS: Status: ACTIVE | Noted: 2019-05-14

## 2019-05-26 ENCOUNTER — HOSPITAL ENCOUNTER (INPATIENT)
Age: 84
LOS: 5 days | Discharge: HOME HEALTH | DRG: 682 | End: 2019-05-31
Attending: EMERGENCY MEDICINE | Admitting: INTERNAL MEDICINE
Payer: MEDICARE

## 2019-05-26 DIAGNOSIS — N17.9 ACUTE RENAL FAILURE, UNSPECIFIED ACUTE RENAL FAILURE TYPE (HCC): Primary | ICD-10-CM

## 2019-05-26 DIAGNOSIS — N30.00 ACUTE CYSTITIS WITHOUT HEMATURIA: ICD-10-CM

## 2019-05-26 DIAGNOSIS — N17.9 AKI (ACUTE KIDNEY INJURY) (HCC): ICD-10-CM

## 2019-05-26 PROBLEM — K21.9 GASTROESOPHAGEAL REFLUX DISEASE WITHOUT ESOPHAGITIS: Status: ACTIVE | Noted: 2019-05-26

## 2019-05-26 LAB
ALBUMIN SERPL-MCNC: 4.3 G/DL (ref 3.5–5.2)
ALP BLD-CCNC: 46 U/L (ref 35–104)
ALT SERPL-CCNC: 8 U/L (ref 5–33)
AMORPHOUS: ABNORMAL /HPF
ANION GAP SERPL CALCULATED.3IONS-SCNC: 17 MMOL/L (ref 7–19)
AST SERPL-CCNC: 11 U/L (ref 5–32)
BACTERIA: ABNORMAL /HPF
BASOPHILS ABSOLUTE: 0 K/UL (ref 0–0.2)
BASOPHILS RELATIVE PERCENT: 0.3 % (ref 0–1)
BILIRUB SERPL-MCNC: <0.2 MG/DL (ref 0.2–1.2)
BILIRUBIN URINE: NEGATIVE
BLOOD, URINE: NEGATIVE
BUN BLDV-MCNC: 45 MG/DL (ref 8–23)
CALCIUM SERPL-MCNC: 9.9 MG/DL (ref 8.2–9.6)
CHLORIDE BLD-SCNC: 97 MMOL/L (ref 98–111)
CLARITY: ABNORMAL
CO2: 24 MMOL/L (ref 22–29)
COLOR: YELLOW
CREAT SERPL-MCNC: 3.9 MG/DL (ref 0.5–0.9)
CREATININE URINE: 79.3 MG/DL (ref 4.2–622)
EOSINOPHIL,URINE: NORMAL
EOSINOPHILS ABSOLUTE: 0.1 K/UL (ref 0–0.6)
EOSINOPHILS RELATIVE PERCENT: 1.1 % (ref 0–5)
EPITHELIAL CELLS, UA: ABNORMAL /HPF
GFR NON-AFRICAN AMERICAN: 11
GLUCOSE BLD-MCNC: 93 MG/DL (ref 74–109)
GLUCOSE URINE: NEGATIVE MG/DL
HCT VFR BLD CALC: 33.5 % (ref 37–47)
HEMOGLOBIN: 11.4 G/DL (ref 12–16)
KETONES, URINE: NEGATIVE MG/DL
LEUKOCYTE ESTERASE, URINE: ABNORMAL
LYMPHOCYTES ABSOLUTE: 2 K/UL (ref 1.1–4.5)
LYMPHOCYTES RELATIVE PERCENT: 17.4 % (ref 20–40)
MCH RBC QN AUTO: 31.4 PG (ref 27–31)
MCHC RBC AUTO-ENTMCNC: 34 G/DL (ref 33–37)
MCV RBC AUTO: 92.3 FL (ref 81–99)
MONOCYTES ABSOLUTE: 0.8 K/UL (ref 0–0.9)
MONOCYTES RELATIVE PERCENT: 6.9 % (ref 0–10)
NEUTROPHILS ABSOLUTE: 8.6 K/UL (ref 1.5–7.5)
NEUTROPHILS RELATIVE PERCENT: 73.7 % (ref 50–65)
NITRITE, URINE: NEGATIVE
PDW BLD-RTO: 11.8 % (ref 11.5–14.5)
PH UA: 7.5 (ref 5–8)
PLATELET # BLD: 286 K/UL (ref 130–400)
PMV BLD AUTO: 9.7 FL (ref 9.4–12.3)
POTASSIUM SERPL-SCNC: 3.7 MMOL/L (ref 3.5–5)
PROTEIN UA: 30 MG/DL
RBC # BLD: 3.63 M/UL (ref 4.2–5.4)
RBC UA: ABNORMAL /HPF (ref 0–2)
SODIUM BLD-SCNC: 138 MMOL/L (ref 136–145)
SODIUM URINE: 87 MMOL/L
SPECIFIC GRAVITY UA: 1.01 (ref 1–1.03)
TOTAL PROTEIN: 7.9 G/DL (ref 6.6–8.7)
TSH SERPL DL<=0.05 MIU/L-ACNC: 1.47 UIU/ML (ref 0.27–4.2)
URINE REFLEX TO CULTURE: YES
UROBILINOGEN, URINE: 0.2 E.U./DL
WBC # BLD: 11.7 K/UL (ref 4.8–10.8)
WBC UA: ABNORMAL /HPF (ref 0–5)
YEAST: ABNORMAL /HPF

## 2019-05-26 PROCEDURE — 99285 EMERGENCY DEPT VISIT HI MDM: CPT | Performed by: EMERGENCY MEDICINE

## 2019-05-26 PROCEDURE — 99223 1ST HOSP IP/OBS HIGH 75: CPT | Performed by: INTERNAL MEDICINE

## 2019-05-26 PROCEDURE — 84443 ASSAY THYROID STIM HORMONE: CPT

## 2019-05-26 PROCEDURE — 84300 ASSAY OF URINE SODIUM: CPT

## 2019-05-26 PROCEDURE — 2580000003 HC RX 258: Performed by: EMERGENCY MEDICINE

## 2019-05-26 PROCEDURE — 2580000003 HC RX 258: Performed by: INTERNAL MEDICINE

## 2019-05-26 PROCEDURE — 6370000000 HC RX 637 (ALT 250 FOR IP): Performed by: INTERNAL MEDICINE

## 2019-05-26 PROCEDURE — 80053 COMPREHEN METABOLIC PANEL: CPT

## 2019-05-26 PROCEDURE — 6360000002 HC RX W HCPCS: Performed by: EMERGENCY MEDICINE

## 2019-05-26 PROCEDURE — 6360000002 HC RX W HCPCS: Performed by: INTERNAL MEDICINE

## 2019-05-26 PROCEDURE — 82570 ASSAY OF URINE CREATININE: CPT

## 2019-05-26 PROCEDURE — 1210000000 HC MED SURG R&B

## 2019-05-26 PROCEDURE — 87086 URINE CULTURE/COLONY COUNT: CPT

## 2019-05-26 PROCEDURE — 81001 URINALYSIS AUTO W/SCOPE: CPT

## 2019-05-26 PROCEDURE — 99285 EMERGENCY DEPT VISIT HI MDM: CPT

## 2019-05-26 PROCEDURE — 87205 SMEAR GRAM STAIN: CPT

## 2019-05-26 PROCEDURE — 96374 THER/PROPH/DIAG INJ IV PUSH: CPT

## 2019-05-26 PROCEDURE — 85025 COMPLETE CBC W/AUTO DIFF WBC: CPT

## 2019-05-26 PROCEDURE — 36415 COLL VENOUS BLD VENIPUNCTURE: CPT

## 2019-05-26 RX ORDER — PRAVASTATIN SODIUM 20 MG
10 TABLET ORAL NIGHTLY
Status: DISCONTINUED | OUTPATIENT
Start: 2019-05-26 | End: 2019-05-28

## 2019-05-26 RX ORDER — ACETAMINOPHEN 325 MG/1
650 TABLET ORAL EVERY 4 HOURS PRN
Status: DISCONTINUED | OUTPATIENT
Start: 2019-05-26 | End: 2019-05-31 | Stop reason: HOSPADM

## 2019-05-26 RX ORDER — SODIUM CHLORIDE 0.9 % (FLUSH) 0.9 %
10 SYRINGE (ML) INJECTION EVERY 12 HOURS SCHEDULED
Status: DISCONTINUED | OUTPATIENT
Start: 2019-05-26 | End: 2019-05-31 | Stop reason: HOSPADM

## 2019-05-26 RX ORDER — HEPARIN SODIUM 5000 [USP'U]/ML
5000 INJECTION, SOLUTION INTRAVENOUS; SUBCUTANEOUS EVERY 8 HOURS SCHEDULED
Status: DISCONTINUED | OUTPATIENT
Start: 2019-05-26 | End: 2019-05-30

## 2019-05-26 RX ORDER — SODIUM CHLORIDE 9 MG/ML
INJECTION, SOLUTION INTRAVENOUS CONTINUOUS
Status: DISCONTINUED | OUTPATIENT
Start: 2019-05-26 | End: 2019-05-30

## 2019-05-26 RX ORDER — ONDANSETRON 2 MG/ML
4 INJECTION INTRAMUSCULAR; INTRAVENOUS EVERY 6 HOURS PRN
Status: DISCONTINUED | OUTPATIENT
Start: 2019-05-26 | End: 2019-05-31 | Stop reason: HOSPADM

## 2019-05-26 RX ORDER — HYDRALAZINE HYDROCHLORIDE 20 MG/ML
10 INJECTION INTRAMUSCULAR; INTRAVENOUS EVERY 6 HOURS PRN
Status: DISCONTINUED | OUTPATIENT
Start: 2019-05-26 | End: 2019-05-31 | Stop reason: HOSPADM

## 2019-05-26 RX ORDER — 0.9 % SODIUM CHLORIDE 0.9 %
1000 INTRAVENOUS SOLUTION INTRAVENOUS ONCE
Status: COMPLETED | OUTPATIENT
Start: 2019-05-26 | End: 2019-05-26

## 2019-05-26 RX ORDER — SODIUM CHLORIDE 0.9 % (FLUSH) 0.9 %
10 SYRINGE (ML) INJECTION PRN
Status: DISCONTINUED | OUTPATIENT
Start: 2019-05-26 | End: 2019-05-31 | Stop reason: HOSPADM

## 2019-05-26 RX ADMIN — SODIUM CHLORIDE 1000 ML: 9 INJECTION, SOLUTION INTRAVENOUS at 17:21

## 2019-05-26 RX ADMIN — SODIUM CHLORIDE: 9 INJECTION, SOLUTION INTRAVENOUS at 22:56

## 2019-05-26 RX ADMIN — CEFTRIAXONE 1 G: 1 INJECTION, POWDER, FOR SOLUTION INTRAMUSCULAR; INTRAVENOUS at 18:22

## 2019-05-26 RX ADMIN — PRAVASTATIN SODIUM 10 MG: 20 TABLET ORAL at 22:56

## 2019-05-26 RX ADMIN — HEPARIN SODIUM 5000 UNITS: 5000 INJECTION, SOLUTION INTRAVENOUS; SUBCUTANEOUS at 22:56

## 2019-05-26 ASSESSMENT — ENCOUNTER SYMPTOMS
VOMITING: 0
SHORTNESS OF BREATH: 0
SORE THROAT: 0
COUGH: 0
DIARRHEA: 0
NAUSEA: 0
RHINORRHEA: 0
BACK PAIN: 0
ABDOMINAL PAIN: 0

## 2019-05-26 NOTE — ED PROVIDER NOTES
[facility] 3 PERI/VAS/MED  eMERGENCY dEPARTMENT eNCOUnter      Pt Name: Samantha Contreras  MRN: [id number]  [place] 4/25/1927  Date of evaluation: 5/26/2019  Provider: Edson Johnston MD    [address] [place] [address]       Chief Complaint   Patient presents with    Dehydration         HISTORY OF PRESENT ILLNESS   (Location/Symptom, Timing/Onset,Context/Setting, Quality, Duration, Modifying Factors, Severity)  Note limiting factors. Samantha Contreras is a 80 y.o. female who presents to the emergency department for concern of generalized weakness fatigue and possibly dehydration. The patient supposedly has lost 35 pounds approximately over the past 6 months. She has had endoscopy colonoscopies and multiple numerous other tests per her daughter with no obvious etiology. They both admit that she does not have much of an appetite and poor caloric intake. She has no complaint of chest pain shortness of breath cough abdominal or GI symptoms. Denies any urinary symptoms. No fever or chills. HPI    NursingNotes were reviewed. REVIEW OF SYSTEMS    (2-9 systems for level 4, 10 or more for level 5)     Review of Systems   Constitutional: Positive for appetite change, fatigue and unexpected weight change. Negative for chills and fever. HENT: Negative for rhinorrhea and sore throat. Respiratory: Negative for cough and shortness of breath. Cardiovascular: Negative for chest pain and leg swelling. Gastrointestinal: Negative for abdominal pain, diarrhea, nausea and vomiting. Genitourinary: Negative for dysuria, frequency and urgency. Musculoskeletal: Negative for back pain and neck pain. Neurological: Negative for dizziness and headaches. All other systems reviewed and are negative.            PAST MEDICALHISTORY     Past Medical History:   Diagnosis Date    Heart palpitations     Hypertension          SURGICAL HISTORY       Past Surgical History:   Procedure Laterality Date    CAROTID ENDARTERECTOMY      CHOLECYSTECTOMY [facility] STONE SURGERY           CURRENT MEDICATIONS     Current Discharge Medication List      CONTINUE these medications which have NOT CHANGED    Details   pantoprazole sodium (PROTONIX) 40 MG PACK packet Take 40 mg by mouth every morning (before breakfast)      cloNIDine (CATAPRES) 0.1 MG tablet Take 0.1 mg by mouth 2 times daily      diltiazem (DILACOR XR) 240 MG extended release capsule Take 240 mg by mouth daily      potassium chloride (KLOR-CON M) 10 MEQ extended release tablet Take 1 tablet by mouth 2 times daily  Qty: 20 tablet, Refills: 0      chlorthalidone (HYGROTON) 25 MG tablet Take 25 mg by mouth daily Take 2 tablets by mouth daily. amLODIPine (NORVASC) 2.5 MG tablet Take 2.5 mg by mouth daily      pravastatin (PRAVACHOL) 10 MG tablet Take 10 mg by mouth daily      cloNIDine (KAPVAY) 0.1 MG TB12 extended release tablet Take 0.1 mg by mouth as needed             ALLERGIES     Codeine    FAMILY HISTORY       Family History   Problem Relation Age of Onset    Stroke Mother         CAD, palpitations,  [address] History     Socioeconomic History    Marital status:       Spouse name: None    Number of children: None    Years of education: None    Highest education level: None   Occupational History    None   Social Needs    Financial resource strain: None    Food insecurity:     Worry: None     Inability: None    Transportation needs:     Medical: None     Non-medical: None   Tobacco Use    Smoking status: Never Smoker    Smokeless tobacco: Never Used   Substance and Sexual Activity    Alcohol use: No     Alcohol/week: 0.0 oz    Drug use: No    Sexual activity: None   Lifestyle    Physical activity:     Days per week: None     Minutes per session: None    Stress: None   Relationships    Social connections:     Talks on phone: None     Gets together: None     Attends Alevism service: None     Active member of club or organization: None     Attends meetings of clubs or organizations: None     Relationship status: None    Intimate partner violence:     Fear of current or ex partner: None     Emotionally abused: None     Physically abused: None     Forced sexual activity: None   Other Topics Concern    None   Social History Narrative    None       SCREENINGS             PHYSICAL EXAM    (up to 7 for level 4, 8 or more for level 5)     ED Triage Vitals [05/26/19 1603]   BP Temp Temp src Pulse Resp SpO2 Height Weight   (!) 135/55 -- -- 93 20 93 % -- 94 lb (42.6 kg)       Physical Exam   Constitutional: She is oriented to person, place, and time. She appears well-developed and well-nourished. No distress. HENT:   Head: Normocephalic and atraumatic. Right Ear: External ear normal.   Left Ear: External ear normal.   Lips dry   Eyes: Conjunctivae and EOM are normal.   Neck: Normal range of motion. No tracheal deviation present. Cardiovascular: Normal rate, regular rhythm, normal heart sounds and intact distal pulses. No murmur heard. Pulmonary/Chest: Breath sounds normal. No respiratory distress. She has no wheezes. She has no rales. Abdominal: Soft. She exhibits no mass. There is no tenderness. Musculoskeletal: Normal range of motion. She exhibits no edema. Neurological: She is alert and oriented to person, place, and time. GCS eye subscore is 4. GCS verbal subscore is 5. GCS motor subscore is 6. Skin: Skin is warm and dry. Vitals reviewed.       DIAGNOSTIC RESULTS             US RENAL COMPLETE    (Results Pending)           LABS:  Labs Reviewed   CBC WITH AUTO DIFFERENTIAL - Abnormal; Notable for the following components:       Result Value    WBC 11.7 (*)     RBC 3.63 (*)     Hemoglobin 11.4 (*)     Hematocrit 33.5 (*)     MCH 31.4 (*)     Neutrophils % 73.7 (*)     Lymphocytes % 17.4 (*)     Neutrophils # 8.6 (*)     All other components within normal limits   COMPREHENSIVE METABOLIC PANEL - Abnormal; Notable for the following components:    Chloride 97 (*)     BUN 45 (*)     CREATININE 3.9 (*)     GFR Non-African American 11 (*)     Calcium 9.9 (*)     All other components within normal limits   URINE RT REFLEX TO CULTURE - Abnormal; Notable for the following components:    Clarity, UA CLOUDY (*)     Protein, UA 30 (*)     Leukocyte Esterase, Urine LARGE (*)     All other components within normal limits   MICROSCOPIC URINALYSIS - Abnormal; Notable for the following components:    WBC, UA 6-10 (*)     Amorphous, UA 1+ (*)     Yeast, UA 1+ (*)     All other components within normal limits   URINE CULTURE   TSH WITHOUT REFLEX   SODIUM, URINE, RANDOM    Narrative:     1700   CREATININE, RANDOM URINE    Narrative:     1700   EOSINOPHIL SMEAR URINE    Narrative:     1671   BASIC METABOLIC PANEL W/ REFLEX TO MG FOR LOW K   CBC WITH AUTO DIFFERENTIAL   PHOSPHORUS   MAGNESIUM   VITAMIN D 25 HYDROXY   PTH, INTACT   URIC ACID       All other labs were within normal range or not returned as of this dictation. EMERGENCY DEPARTMENT COURSE and DIFFERENTIAL DIAGNOSIS/MDM:   Vitals:    Vitals:    05/26/19 1603 05/26/19 1630 05/26/19 1845 05/26/19 1915   BP: (!) 135/55  (!) 154/60 (!) 146/70   Pulse: 93  89 86   Resp: 20  18 16   Temp:  98.1 °F (36.7 °C) 98.4 °F (36.9 °C) 96.2 °F (35.7 °C)   TempSrc:    Temporal   SpO2: 93%  95% 94%   Weight: 94 lb (42.6 kg)   96 lb 4.8 oz (43.7 kg)       MDM  Number of Diagnoses or Management Options     Amount and/or Complexity of Data Reviewed  Clinical lab tests: ordered and reviewed      Generalized weakness, fatigue, weight loss, found to have BRO and UTI, d/w Dr. Trinidad Lu for admission      CONSULTS:  None    PROCEDURES:  Unless otherwise noted below, none     Procedures    FINAL IMPRESSION      1. Acute renal failure, unspecified acute renal failure type ([facility]    2.  Acute cystitis without hematuria          DISPOSITION/PLAN   DISPOSITION Admitted 05/26/2019 05:42:21 PM      PATIENT REFERRED TO:  Cheko Villalpando Rosy Ramirez MD  [address] [id number]  [phone]          Vinod Albarado MD  [address] [place], [place] [id number] [phone]            DISCHARGE MEDICATIONS:  Current Discharge Medication List             (Please note that portions of this note were completed with a voice recognition program.  Efforts were made to edit thedictations but occasionally words are mis-transcribed.)    Dominic Thayer MD (electronically signed)  Attending Emergency Physician        Kanchan Oates MD  05/26/19 9712

## 2019-05-26 NOTE — H&P
Take 2 tablets by mouth daily. Yes Historical Provider, MD   amLODIPine (NORVASC) 2.5 MG tablet Take 2.5 mg by mouth daily   Yes Historical Provider, MD   pravastatin (PRAVACHOL) 10 MG tablet Take 10 mg by mouth daily   Yes Historical Provider, MD   cloNIDine (KAPVAY) 0.1 MG TB12 extended release tablet Take 0.1 mg by mouth as needed   Yes Historical Provider, MD       Allergies:    Codeine    Social History:    The patient currently lives at home  Tobacco:   reports that she has never smoked. She has never used smokeless tobacco.  Alcohol:   reports that she does not drink alcohol. Illicit Drugs: denies    Family History:      Problem Relation Age of Onset    Stroke Mother         CAD, palpitations,  12       Review of Systems:   As per HPI, rest of 14 point ROS reviewed and negative. Physical Examination:  BP (!) 135/55   Pulse 93   Temp 98.1 °F (36.7 °C)   Resp 20   Wt 94 lb (42.6 kg)   SpO2 93%   BMI 17.19 kg/m²   General appearance: alert, appears stated age, cooperative and no distress  Head: Normocephalic, without obvious abnormality, atraumatic  Eyes: conjunctivae/corneas clear. PERRL, EOM's intact. Ears: normal external ears  Neck: no adenopathy, no carotid bruit, no JVD, supple, symmetrical, trachea midline   Lungs: clear to auscultation bilaterally,no rales or wheezes   Heart: regular rate and rhythm, S1, S2 normal  Abdomen:soft, non-tender, non-distended, +BS, no guarding/rebound  Extremities: No edema, no cyanosis, no deformities  Skin: Skin color, texture. No rashes or lesions.    Lymphatic: No palpable lymph node enlargment  Neurologic: Alert and oriented X 3, CN II-XII intact, no focal deficits   Psychiatric: Normal mood/affect      Diagnostic Data:  CBC:  Recent Labs     19  1630   WBC 11.7*   HGB 11.4*   HCT 33.5*        BMP:  Recent Labs     19  1630      K 3.7   CL 97*   CO2 24   BUN 45*   CREATININE 3.9*   CALCIUM 9.9*     Recent Labs 05/26/19  1630   AST 11   ALT 8   BILITOT <0.2   ALKPHOS 46     Urinalysis:  Lab Results   Component Value Date    NITRU Negative 05/26/2019    WBCUA 6-10 05/26/2019    BACTERIA 1+ 05/26/2019    RBCUA 0-1 05/26/2019    BLOODU Negative 05/26/2019    SPECGRAV 1.012 05/26/2019    GLUCOSEU Negative 05/26/2019         Active Hospital Problems    Diagnosis Date Noted    BRO (acute kidney injury) ([facility] [N17.9] 05/26/2019    Gastroesophageal reflux disease without esophagitis [K21.9] 05/26/2019    Acute cystitis without hematuria [N30.00] 05/26/2019    Essential hypertension [I10] 12/15/2016       IMPRESSION / PLAN:  Principal Problem:    BRO (acute kidney injury) ([facility] - likely in setting of dehydration. Does take ppi. Admit. Hold ppi and chlorthalidone. IVF resus. Check urinalysis, urine na, urine creat, urine eosinophil, pth, vit d25, phos, mag. Check renal u/s. If renal function not better in the AM would consult nephrology    Active Problems:    Essential hypertension - bp normal in ER, hold bp meds for now and ivf resus. Prn hydralazine. Add back meds as needed      Gastroesophageal reflux disease without esophagitis - hold ppi for now, denies any active GERD symptoms. monitor      Acute cystitis without hematuria - given ceftriaxone in ER, continue for now and f/u cultures     Weight loss - pt denies any mechanical issues eating, states she just has no appetite. States he has had cxr, ct abd/pelv, mammo, colo, egd and all normal so far. Defer ongoing w/u to PCP as long as pt physically able to eat.        Jacek Barrera, DO  5/26/2019

## 2019-05-27 ENCOUNTER — APPOINTMENT (OUTPATIENT)
Dept: ULTRASOUND IMAGING | Age: 84
DRG: 682 | End: 2019-05-27
Payer: MEDICARE

## 2019-05-27 LAB
ANION GAP SERPL CALCULATED.3IONS-SCNC: 16 MMOL/L (ref 7–19)
BASOPHILS ABSOLUTE: 0 K/UL (ref 0–0.2)
BASOPHILS RELATIVE PERCENT: 0.2 % (ref 0–1)
BUN BLDV-MCNC: 35 MG/DL (ref 8–23)
CALCIUM SERPL-MCNC: 8.9 MG/DL (ref 8.2–9.6)
CHLORIDE BLD-SCNC: 104 MMOL/L (ref 98–111)
CO2: 20 MMOL/L (ref 22–29)
CREAT SERPL-MCNC: 3 MG/DL (ref 0.5–0.9)
EOSINOPHILS ABSOLUTE: 0.2 K/UL (ref 0–0.6)
EOSINOPHILS RELATIVE PERCENT: 1.9 % (ref 0–5)
GFR NON-AFRICAN AMERICAN: 15
GLUCOSE BLD-MCNC: 82 MG/DL (ref 70–99)
GLUCOSE BLD-MCNC: 97 MG/DL (ref 74–109)
HCT VFR BLD CALC: 31 % (ref 37–47)
HEMOGLOBIN: 10.4 G/DL (ref 12–16)
LYMPHOCYTES ABSOLUTE: 2 K/UL (ref 1.1–4.5)
LYMPHOCYTES RELATIVE PERCENT: 22.8 % (ref 20–40)
MAGNESIUM: 2 MG/DL (ref 1.7–2.3)
MCH RBC QN AUTO: 31.7 PG (ref 27–31)
MCHC RBC AUTO-ENTMCNC: 33.5 G/DL (ref 33–37)
MCV RBC AUTO: 94.5 FL (ref 81–99)
MONOCYTES ABSOLUTE: 0.5 K/UL (ref 0–0.9)
MONOCYTES RELATIVE PERCENT: 5.7 % (ref 0–10)
NEUTROPHILS ABSOLUTE: 5.9 K/UL (ref 1.5–7.5)
NEUTROPHILS RELATIVE PERCENT: 69 % (ref 50–65)
PARATHYROID HORMONE INTACT: 44.3 PG/ML (ref 15–65)
PDW BLD-RTO: 11.9 % (ref 11.5–14.5)
PERFORMED ON: NORMAL
PHOSPHORUS: 4.2 MG/DL (ref 2.5–4.5)
PLATELET # BLD: 261 K/UL (ref 130–400)
PMV BLD AUTO: 10.1 FL (ref 9.4–12.3)
POTASSIUM REFLEX MAGNESIUM: 3.5 MMOL/L (ref 3.5–5)
RBC # BLD: 3.28 M/UL (ref 4.2–5.4)
SODIUM BLD-SCNC: 140 MMOL/L (ref 136–145)
URIC ACID, SERUM: 6.6 MG/DL (ref 2.4–5.7)
VITAMIN D 25-HYDROXY: 44.2 NG/ML
WBC # BLD: 8.5 K/UL (ref 4.8–10.8)

## 2019-05-27 PROCEDURE — 85025 COMPLETE CBC W/AUTO DIFF WBC: CPT

## 2019-05-27 PROCEDURE — 84550 ASSAY OF BLOOD/URIC ACID: CPT

## 2019-05-27 PROCEDURE — 1210000000 HC MED SURG R&B

## 2019-05-27 PROCEDURE — 6370000000 HC RX 637 (ALT 250 FOR IP): Performed by: INTERNAL MEDICINE

## 2019-05-27 PROCEDURE — 80048 BASIC METABOLIC PNL TOTAL CA: CPT

## 2019-05-27 PROCEDURE — 6360000002 HC RX W HCPCS: Performed by: INTERNAL MEDICINE

## 2019-05-27 PROCEDURE — 83735 ASSAY OF MAGNESIUM: CPT

## 2019-05-27 PROCEDURE — 99232 SBSQ HOSP IP/OBS MODERATE 35: CPT | Performed by: PHYSICIAN ASSISTANT

## 2019-05-27 PROCEDURE — 83970 ASSAY OF PARATHORMONE: CPT

## 2019-05-27 PROCEDURE — 84100 ASSAY OF PHOSPHORUS: CPT

## 2019-05-27 PROCEDURE — 82948 REAGENT STRIP/BLOOD GLUCOSE: CPT

## 2019-05-27 PROCEDURE — 82306 VITAMIN D 25 HYDROXY: CPT

## 2019-05-27 PROCEDURE — 36415 COLL VENOUS BLD VENIPUNCTURE: CPT

## 2019-05-27 PROCEDURE — 2580000003 HC RX 258: Performed by: INTERNAL MEDICINE

## 2019-05-27 PROCEDURE — 76770 US EXAM ABDO BACK WALL COMP: CPT

## 2019-05-27 RX ORDER — CITALOPRAM 10 MG/1
10 TABLET ORAL DAILY
COMMUNITY

## 2019-05-27 RX ORDER — M-VIT,TX,IRON,MINS/CALC/FOLIC 27MG-0.4MG
1 TABLET ORAL DAILY
COMMUNITY

## 2019-05-27 RX ORDER — LORAZEPAM 0.5 MG/1
0.5 TABLET ORAL EVERY 6 HOURS PRN
COMMUNITY
End: 2020-02-23

## 2019-05-27 RX ADMIN — PRAVASTATIN SODIUM 10 MG: 20 TABLET ORAL at 20:40

## 2019-05-27 RX ADMIN — Medication 10 ML: at 20:40

## 2019-05-27 RX ADMIN — HEPARIN SODIUM 5000 UNITS: 5000 INJECTION, SOLUTION INTRAVENOUS; SUBCUTANEOUS at 13:32

## 2019-05-27 RX ADMIN — HEPARIN SODIUM 5000 UNITS: 5000 INJECTION, SOLUTION INTRAVENOUS; SUBCUTANEOUS at 07:24

## 2019-05-27 RX ADMIN — CEFTRIAXONE 1 G: 1 INJECTION, POWDER, FOR SOLUTION INTRAMUSCULAR; INTRAVENOUS at 17:13

## 2019-05-27 RX ADMIN — HEPARIN SODIUM 5000 UNITS: 5000 INJECTION, SOLUTION INTRAVENOUS; SUBCUTANEOUS at 21:50

## 2019-05-27 ASSESSMENT — PAIN SCALES - GENERAL
PAINLEVEL_OUTOF10: 0

## 2019-05-27 NOTE — ED NOTES
Pt report called to floor to Indiana Hendrix RN) to take over pt care Pt admission in progress      Milvia Beasley RN  05/26/19 0080

## 2019-05-27 NOTE — PROGRESS NOTES
[address] [facility]      Patient:    [place]  YOB: 1927  Date of Service:  5/27/2019  MRN:    [id number]    Room:   [address] [place] [address]   PCP:    Zac Suarez MD     Chief Complaint:  Weakness    Subjective:  Remains weaker than baseline. Complains of dizziness. Was able to eat breakfast this morning. Denies chest pain or shortness of breath. Denies nausea or vomiting. Cumulative Hospital Course: The patient is a pleasant 80year old female with a PMH of hypertension who presented to the hospital with complaints of worsening weakness and decreased appetite over the last week. She reports having issues with poor appetite and weight loss for some time, apparently 40 lb weight loss in the last 6 months. She has been undergoing workup by PCP, with EGD and colonoscopy last week. She reported excessive output with GI prep but also has not been eating or drinking much. In the ER, she was found to have BRO, with a creatinine of 3.9 and BUN of 45, and abnormal urinalysis. She was admitted to the hospital and placed on IVF's. PPI and chlorthalidone held. She is started on ceftriaxone for UTI, pending urine culture. Vitamin D, 25-hydroxy noted to be 44.2, PTH noted to be 44.3. TSH is 1.47. Renal ultrasound obtained with no acute abnormality seen. Review of Systems:   10 point review of systems is negative except as specifically addressed above. Objective:   VITALS:  /60   Pulse 70   Temp 97.1 °F (36.2 °C) (Temporal)   Resp 16   Wt 96 lb 4.8 oz (43.7 kg)   SpO2 97%   BMI 17.61 kg/m²   24HR INTAKE/OUTPUT:      Intake/Output Summary (Last 24 hours) at 5/27/2019 1008  Last data filed at 5/27/2019 0915  Gross per 24 hour   Intake 120 ml   Output 1200 ml   Net -1080 ml     General appearance: alert, appears stated age, cooperative and no distress  Head: Normocephalic, without obvious abnormality, atraumatic  Eyes: conjunctivae/corneas clear. PERRL, EOM's intact.    Ears: normal external ears and nose, throat without exudate  Neck: Supple, no JVD, no tracheal deviation  Lungs: Normal respiratory effort, clear to auscultation bilaterally,no rales or wheezes   Heart: Regular rate and rhythm, S1, S2 normal, no murmur  Abdomen: Soft, non-tender; non-distended, normal bowel sounds no masses, no organomegaly  Extremities: No lower extremity edema,  No erythema, no tenderness to palpation  Skin: Skin color, texture, turgor normal. No rashes or lesions  Neurologic: Generalized weakness, no focal deficits noted. Psychiatric: Alert and oriented, thought content appropriate, normal insight, mood appropriate      Medications:      sodium chloride 100 mL/hr at 05/26/19 2256      pravastatin  10 mg Oral Nightly    sodium chloride flush  10 mL Intravenous 2 times per day    heparin (porcine)  5,000 Units Subcutaneous 3 times per day    cefTRIAXone (ROCEPHIN) IV  1 g Intravenous Q24H     sodium chloride flush, ondansetron, acetaminophen, hydrALAZINE  DIET RENAL; Cardiac     Lab and other Data:     Recent Labs     05/26/19  1630 05/27/19  0347   WBC 11.7* 8.5   HGB 11.4* 10.4*    261     Recent Labs     05/26/19  1630 05/27/19  0347    140   K 3.7 3.5   CL 97* 104   CO2 24 20*   BUN 45* 35*   CREATININE 3.9* 3.0*   GLUCOSE 93 97     Recent Labs     05/26/19  1630   AST 11   ALT 8   BILITOT <0.2   ALKPHOS 46     UA:  Recent Labs     05/26/19  1700   COLORU YELLOW   PHUR 7.5   WBCUA 6-10*   RBCUA 0-1   YEAST 1+*   BACTERIA 1+   CLARITYU CLOUDY*   SPECGRAV 1.012   LEUKOCYTESUR LARGE*   UROBILINOGEN 0.2   BILIRUBINUR Negative   BLOODU Negative   GLUCOSEU Negative   AMORPHOUS 1+*       RAD:   Us Renal Complete    Result Date: 5/2/2019  US RENAL COMPLETE 5/2/2019 12:00 PM History: Stage IV chronic kidney disease. Grayscale and color flow ultrasound evaluation of the kidneys. Symmetric kidneys with appropriate cortical thickness and normal cortical echogenicity. No hydronephrosis.  Decompressed and poorly visualized urinary bladder. Right kidney = 84 x 38 x 39 mm. Left kidney = 91 x 38 x 33 mm. 1. No acute abnormality is seen. Signed by Dr Joanne Portillo on 5/2/2019 12:01 PM      Micro:   Urine culture in progress. Problem List:     Patient Active Problem List    Diagnosis Date Noted    BRO (acute kidney injury) ([facility] 05/26/2019    Gastroesophageal reflux disease without esophagitis 05/26/2019    Acute cystitis without hematuria 05/26/2019    Diverticulitis 01/10/2019    Heart palpitations 12/15/2016    Mitral valve insufficiency 12/15/2016     Mild to moderate      Aortic valve insufficiency 12/15/2016     Mild to moderate      Essential hypertension 12/15/2016       Assessment and Plan:     Principal Problem:    BRO (acute kidney injury) ([facility]  Active Problems:    Essential hypertension    Gastroesophageal reflux disease without esophagitis    Acute cystitis without hematuria  Resolved Problems:    * No resolved hospital problems. *    BRO - Some improvement today. Continue IVF's and repeat labs in am.  Renal ultrasound unremarkable. Continue to hold PPI and chlorthalidone. HTN - Controlled. Continue prn hydralazine. GERD - No current symptoms. PPI on hold for now given BRO. UTI - Urine culture in progress. Continue ceftriaxone. Adjust antibiotics when ID and sensitivities available. Discharge planning - lives home alone. Plans to return home when able. States she is not interested in rehab. Would consider home health, but wants to discuss with her daughter first. Daughter will be here at the hospital tomorrow. Advance Directive:  Full Code   Antibiotic:  Ceftriaxone   DVT Prophylaxis:  Heparin    Flakito Estrella PA-C  5/27/2019   I  Independently. Had  Chart reviewed and events noted. No new complaints other than stated above. Vitals noted. Chest CTAB. S1 S2 RRR. GI bening. Neuro no new deficits. Integumentary no rash. Labs noted.          Agree with above assessment and plan. Will continue to follow.

## 2019-05-27 NOTE — PROGRESS NOTES
Lorna Aceves arrived to room 331. Presented with: BRO  Dehydration  Mental Status: Patient is oriented and alert. Vitals:    05/26/19 1915   BP: (!) 146/70   Pulse: 86   Resp: 16   Temp: 96.2 °F (35.7 °C)   SpO2: 94%     Patient safety contract and falls prevention contract reviewed with patient Yes. Oriented Patient to room. Call light within reach. Yes.   Needs, issues or concerns expressed at this time: no.      Electronically signed by Armida Hammond RN on 5/26/2019 at 7:20 PM

## 2019-05-27 NOTE — PROGRESS NOTES
4 Eyes Skin Assessment    Clearance Cece is being assessed upon: Admission    I agree that I, Vicente Monson, along with Crissy Arcos(either 2 RN's or 1 LPN and 1 RN) have performed a thorough Head to Toe Skin Assessment on the patient. ALL assessment sites listed below have been assessed. Areas assessed by both nurses:     [x]   Head, Face, and Ears   [x]   Shoulders, Back, and Chest  [x]   Arms, Elbows, and Hands   [x]   Coccyx, Sacrum, and Ischium  [x]   Legs, Feet, and Heels    Does the Patient have Skin Breakdown?  No    Guzman Prevention initiated: Yes  Wound Care Orders initiated: No    [facility] nurse consulted for Pressure Injury (Stage 3,4, Unstageable, DTI, NWPT, and Complex wounds) and New or Established Ostomies: No        Primary Nurse eSignature: Vicente Monson RN on 5/27/2019 at 5:24 AM      Co-Signer eSignature: Electronically signed by Iris Loomis RN on 5/27/19 at 10:46 PM

## 2019-05-28 ENCOUNTER — APPOINTMENT (OUTPATIENT)
Dept: CT IMAGING | Age: 84
DRG: 682 | End: 2019-05-28
Payer: MEDICARE

## 2019-05-28 PROBLEM — R41.0 DELIRIUM: Status: ACTIVE | Noted: 2019-05-28

## 2019-05-28 LAB
ANION GAP SERPL CALCULATED.3IONS-SCNC: 15 MMOL/L (ref 7–19)
BUN BLDV-MCNC: 31 MG/DL (ref 8–23)
CALCIUM SERPL-MCNC: 9.2 MG/DL (ref 8.2–9.6)
CHLORIDE BLD-SCNC: 105 MMOL/L (ref 98–111)
CO2: 19 MMOL/L (ref 22–29)
CREAT SERPL-MCNC: 2.8 MG/DL (ref 0.5–0.9)
GFR NON-AFRICAN AMERICAN: 16
GLUCOSE BLD-MCNC: 106 MG/DL (ref 74–109)
HCT VFR BLD CALC: 31 % (ref 37–47)
HEMOGLOBIN: 10.3 G/DL (ref 12–16)
MCH RBC QN AUTO: 31 PG (ref 27–31)
MCHC RBC AUTO-ENTMCNC: 33.2 G/DL (ref 33–37)
MCV RBC AUTO: 93.4 FL (ref 81–99)
PDW BLD-RTO: 11.9 % (ref 11.5–14.5)
PLATELET # BLD: 262 K/UL (ref 130–400)
PMV BLD AUTO: 9.8 FL (ref 9.4–12.3)
POTASSIUM SERPL-SCNC: 3.7 MMOL/L (ref 3.5–5)
RBC # BLD: 3.32 M/UL (ref 4.2–5.4)
SODIUM BLD-SCNC: 139 MMOL/L (ref 136–145)
URINE CULTURE, ROUTINE: NORMAL
WBC # BLD: 9.5 K/UL (ref 4.8–10.8)

## 2019-05-28 PROCEDURE — 6360000002 HC RX W HCPCS: Performed by: INTERNAL MEDICINE

## 2019-05-28 PROCEDURE — 85027 COMPLETE CBC AUTOMATED: CPT

## 2019-05-28 PROCEDURE — 99232 SBSQ HOSP IP/OBS MODERATE 35: CPT | Performed by: PHYSICIAN ASSISTANT

## 2019-05-28 PROCEDURE — 2580000003 HC RX 258: Performed by: INTERNAL MEDICINE

## 2019-05-28 PROCEDURE — 70450 CT HEAD/BRAIN W/O DYE: CPT

## 2019-05-28 PROCEDURE — 87086 URINE CULTURE/COLONY COUNT: CPT

## 2019-05-28 PROCEDURE — 1210000000 HC MED SURG R&B

## 2019-05-28 PROCEDURE — 99232 SBSQ HOSP IP/OBS MODERATE 35: CPT | Performed by: HOSPITALIST

## 2019-05-28 PROCEDURE — 6370000000 HC RX 637 (ALT 250 FOR IP): Performed by: PHYSICIAN ASSISTANT

## 2019-05-28 PROCEDURE — 36415 COLL VENOUS BLD VENIPUNCTURE: CPT

## 2019-05-28 PROCEDURE — 80048 BASIC METABOLIC PNL TOTAL CA: CPT

## 2019-05-28 RX ORDER — PANTOPRAZOLE SODIUM 40 MG/1
40 TABLET, DELAYED RELEASE ORAL
Status: DISCONTINUED | OUTPATIENT
Start: 2019-05-28 | End: 2019-05-28

## 2019-05-28 RX ORDER — CITALOPRAM 10 MG/1
10 TABLET ORAL DAILY
Status: DISCONTINUED | OUTPATIENT
Start: 2019-05-28 | End: 2019-05-31 | Stop reason: HOSPADM

## 2019-05-28 RX ORDER — ZIPRASIDONE MESYLATE 20 MG/ML
10 INJECTION, POWDER, LYOPHILIZED, FOR SOLUTION INTRAMUSCULAR EVERY 4 HOURS PRN
Status: DISCONTINUED | OUTPATIENT
Start: 2019-05-28 | End: 2019-05-31 | Stop reason: HOSPADM

## 2019-05-28 RX ORDER — LORAZEPAM 0.5 MG/1
0.5 TABLET ORAL EVERY 8 HOURS PRN
Status: DISCONTINUED | OUTPATIENT
Start: 2019-05-28 | End: 2019-05-31 | Stop reason: HOSPADM

## 2019-05-28 RX ORDER — DILTIAZEM HYDROCHLORIDE 120 MG/1
240 CAPSULE, COATED, EXTENDED RELEASE ORAL DAILY
Status: DISCONTINUED | OUTPATIENT
Start: 2019-05-28 | End: 2019-05-31 | Stop reason: HOSPADM

## 2019-05-28 RX ADMIN — LORAZEPAM 0.5 MG: 0.5 TABLET ORAL at 07:52

## 2019-05-28 RX ADMIN — CEFTRIAXONE 1 G: 1 INJECTION, POWDER, FOR SOLUTION INTRAMUSCULAR; INTRAVENOUS at 18:10

## 2019-05-28 RX ADMIN — Medication 10 ML: at 20:32

## 2019-05-28 RX ADMIN — PANTOPRAZOLE SODIUM 40 MG: 40 TABLET, DELAYED RELEASE ORAL at 07:52

## 2019-05-28 RX ADMIN — CITALOPRAM HYDROBROMIDE 10 MG: 10 TABLET ORAL at 07:52

## 2019-05-28 RX ADMIN — HEPARIN SODIUM 5000 UNITS: 5000 INJECTION, SOLUTION INTRAVENOUS; SUBCUTANEOUS at 05:02

## 2019-05-28 RX ADMIN — LORAZEPAM 0.5 MG: 0.5 TABLET ORAL at 20:32

## 2019-05-28 RX ADMIN — NIFEDIPINE 240 MG: 10 CAPSULE ORAL at 07:52

## 2019-05-28 NOTE — PROGRESS NOTES
Nutrition Assessment    Type and Reason for Visit: Initial, Positive Nutrition Screen    Nutrition Recommendations: start ONS-Ensure Enlive with meals    Nutrition Assessment: Positive nutrition screen for decreased po intake and weight loss. Pt refusedlunch today, ate only a little bit of breakfast this morning. Has lost 12.8% of weight since 1/2019. Malnutrition Assessment:  · Malnutrition Status: Meets the criteria for severe malnutrition  · Context: Acute illness or injury  · Findings of the 6 clinical characteristics of malnutrition (Minimum of 2 out of 6 clinical characteristics is required to make the diagnosis of moderate or severe Protein Calorie Malnutrition based on AND/ASPEN Guidelines):  1. Energy Intake-Less than or equal to 50% of estimated energy requirement,      2. Weight Loss-10% loss or greater, (5 months)  3. Fat Loss-Severe subcutaneous fat loss,    4. Muscle Loss-Severe muscle mass loss,    5. Fluid Accumulation-No significant fluid accumulation,    6.   Strength-Not measured    Nutrition Risk Level: High    Nutrient Needs:  · Estimated Daily Total Kcal: 1930-5666 kcals (33-35kcals/kg)  · Estimated Daily Protein (g): 45-54g  · Estimated Daily Total Fluid (ml/day): 5314-6939 ml    Nutrition Diagnosis:   · Problem: Severe malnutrition  · Etiology: related to Early satiety, Insufficient energy/nutrient consumption     Signs and symptoms:  as evidenced by Patient report of, Weight loss, BMI, Severe loss of subcutaneous fat, Severe muscle loss    Objective Information:  · Nutrition-Focused Physical Findings: very thin appearing female  · Wound Type: None  · Current Nutrition Therapies:  · Oral Diet Orders: Renal, Cardiac   · Oral Diet intake: 0%  · Oral Nutrition Supplement (ONS) Orders: None, Low Calorie High Protein Supplement  · ONS intake: NA    · Anthropometric Measures:  · Ht: 5' 3\" (160 cm)   · Current Body Wt: 98 lb 6 oz (44.6 kg)  · Admission Body Wt: 94 lb (42.6 kg)  · Usual Body Wt: 113 lb (51.3 kg)(1/2019)  · % Weight Change:  ,  12.8% decrease in 5 moths  · Ideal Body Wt: 115 lb (52.2 kg), % Ideal Body 85.7%  · BMI Classification: BMI <18.5 Underweight    Nutrition Interventions:   Start oral diet, Start ONS  Continued Inpatient Monitoring    Nutrition Evaluation:   · Evaluation: Goals set   · Goals: po intake 50%.   Weight stable or increase 1-3# week    · Monitoring: Meal Intake, Supplement Intake, Diet Tolerance, Skin Integrity, Weight, Pertinent Labs      Electronically signed by Velma Membreno, MS, RD, LD on 5/28/19 at 2:46 PM    Contact Number: [phone]

## 2019-05-28 NOTE — PROGRESS NOTES
Family at patient's bedside, patient is calmer and more cooperative. CT scan completed. Still confused at this time. Will continue to monitor.  Electronically signed by Mike Yanez RN on 5/28/2019 at 10:42 AM

## 2019-05-28 NOTE — PLAN OF CARE
Problem: Risk for Impaired Skin Integrity  Goal: Tissue integrity - skin and mucous membranes  Description  Structural intactness and normal physiological function of skin and  mucous membranes.   5/27/2019 2359 by Renetta Velasquez RN  Outcome: Ongoing  5/27/2019 1118 by Michael Gonsales RN  Outcome: Ongoing     Problem: Falls - Risk of:  Goal: Will remain free from falls  Description  Will remain free from falls  5/27/2019 2359 by Renetta Velasquez RN  Outcome: Ongoing  5/27/2019 1118 by Michael Gonsales RN  Outcome: Ongoing  Goal: Absence of physical injury  Description  Absence of physical injury  5/27/2019 2359 by Renetta Velasquez RN  Outcome: Ongoing  5/27/2019 1118 by Michael Gonsales RN  Outcome: Ongoing     Problem: ABCDS Injury Assessment  Goal: Absence of physical injury  5/27/2019 2359 by Renetta Velasquez RN  Outcome: Ongoing  5/27/2019 1118 by Michael Gonsales RN  Outcome: Ongoing

## 2019-05-28 NOTE — CARE COORDINATION
[address] Transitions Interview     2019    Patient: Samantha Contreras Patient : 1927   MRN: [id number]   RARS: Readmission Risk Score: 25         Spoke with: Samantha Contreras  and daughter      Readmission Risk  Patient Active Problem List   Diagnosis    Heart palpitations    Mitral valve insufficiency    Aortic valve insufficiency    Essential hypertension    Diverticulitis    BRO [facility]) ([facility] [address]    Gastroesophageal reflux disease without esophagitis    Acute cystitis without hematuria       Inpatient Assessment  Care Transitions Summary    Care Transitions Inpatient Review  Medication Review  Do you have all of your prescriptions and are they filled?:  Yes   Are you able to afford your medications?:  Yes  How often do you have difficulty taking your medications?:  I always take them as prescribed. Housing Review  Who do you live with?:  Alone  Are you an active caregiver in your home?:  No  Social Support  Do you have a ?:  No  Do you have a [facility]?:  No  Durable Medical Equipment  Patient DME:  Straight cane  Functional Review  Ability to seek help/take action for Emergent/Urgent situations i.e. fire, crime, inclement weather or health crisis. :  Independent  Ability handle personal hygiene needs (bathing/dressing/grooming): Independent  Ability to manage medications:  Needs Assistance  Ability to prepare food:  Needs Assistance  Ability to maintain home (clean home, laundry):  Needs Assistance  Ability to do shopping:  Needs Assistance  Ability to manage finances: Independent  Is patient able to live independently?:  Yes  Hearing and Vision  Visual Impairment:  Visual impairment (Glasses/contacts)  Hearing Impairment:  None  Care Transitions Interventions         Follow Up: Met at bedside with patient and daughter. Discussed CTC process. Contact information given.  Patient is agreeable with appropriate follow up as indicated after discharge. Patient is actually known to me from previous hospitalizations and CTC follow. She lives alone. She is not aware of any changes in living conditions or needs since last hospitalization. She reported that she is feeling better this morning and has gained a little bit of weight back since being here. She apparently has lost a great deal of weight over the past few weeks, appetite is poor, etc.  She denied any issues with obtaining meds, supplies, equipment, etc.  Will follow along while here and after discharge as indicated. Health Maintenance  There are no preventive care reminders to display for this patient.     Dixie Sorto RN

## 2019-05-28 NOTE — CARE COORDINATION
Pt lives at home alone; has good family support. Has DME at home.     Electronically signed by DEJA Pelayo on 5/28/2019 at 4:54 PM

## 2019-05-28 NOTE — PROGRESS NOTES
[address] [facility]    Patient:  Huy Plata  YOB: 1927  Date of Service: 5/28/2019  MRN: [id number]   Acct: [de-identified]   Primary Care Physician: Laurel Lesch, MD  Advance Directive: Full Code  Admit Date: 5/26/2019       Hospital Day: 2    CHIEF COMPLAINT Weakness    SUBJECTIVE:  Ms. Zaheer Cristina has no new complaints though RN and daughter reports patient is confused this morning. Daughter states this has never happened before. Patient normally lives at home alone but about a month prior to admission daughter noticed subtle changes in patient's behavior to include switching primary doctors and displaying paranoid behavior. Cumulative Hospital Course: \"The patient is a pleasant 80year old female with a PMH of hypertension who presented to the hospital with complaints of worsening weakness and decreased appetite over the last week. She reports having issues with poor appetite and weight loss for some time, apparently 40 lb weight loss in the last 6 months. She has been undergoing workup by PCP, with EGD and colonoscopy last week. She reported excessive output with GI prep but also has not been eating or drinking much. In the ER, she was found to have BRO, with a creatinine of 3.9 and BUN of 45, and abnormal urinalysis.     She was admitted to the hospital and placed on IVF's. PPI and chlorthalidone held. She is started on ceftriaxone for UTI, pending urine culture.      Vitamin D, 25-hydroxy noted to be 44.2, PTH noted to be 44.3. TSH is 1.47. Renal ultrasound obtained with no acute abnormality seen. \"    05/28/2019 Patient was noted to be confused. Repeat urine culture obtained, CT head negative. Ativan, Celexa, Diltiazem resumed. Review of Systems:   14 point review of systems is negative except as specifically addressed above.     Objective:   VITALS:  BP (!) 142/88   Pulse 101   Temp 97 °F (36.1 °C) (Temporal)   Resp 16   Ht 5' 3\" (1.6 m)   Wt 98 lb 6 oz (44.6 kg)   SpO2 96%   BMI 05/27/19  0347 05/28/19  0250   WBC 11.7* 8.5 9.5   HGB 11.4* 10.4* 10.3*    261 262     Recent Labs     05/26/19  1630 05/27/19  0347 05/28/19  0250    140 139   K 3.7 3.5 3.7   CL 97* 104 105   CO2 24 20* 19*   BUN 45* 35* 31*   CREATININE 3.9* 3.0* 2.8*   GLUCOSE 93 97 106     Recent Labs     05/26/19  1630   AST 11   ALT 8   BILITOT <0.2   ALKPHOS 46     UA:  Recent Labs     05/26/19  1700   COLORU YELLOW   PHUR 7.5   WBCUA 6-10*   RBCUA 0-1   YEAST 1+*   BACTERIA 1+   CLARITYU CLOUDY*   SPECGRAV 1.012   LEUKOCYTESUR LARGE*   UROBILINOGEN 0.2   BILIRUBINUR Negative   BLOODU Negative   GLUCOSEU Negative   AMORPHOUS 1+*     RAD:   Us Renal Complete  The kidneys are sonographically normal for age. Signed by Dr Carmine Sorto on 5/27/2019 11:26 AM    CT head 05/28/2019  Mild cerebral and cerebellar volume loss with chronic microvascular  disease but no evidence of acute intracranial process. Micro: Urine culture no growth     Assessment/Plan   Principal Problem:    BRO (acute kidney injury) ([address]  Active Problems:    Essential hypertension    Gastroesophageal reflux disease without esophagitis    Acute cystitis without hematuria    Delirium  Resolved Problems:    * No resolved hospital problems. *     Urine culture negative, CT head negative. Daughter confirms patient has been taking Celexa, Ativan at home. Will resume home medications as she may be withdrawing from benzos. Resume diltiazem. Creatinine slowly improving, continue IVF's. Repeat labs in AM.     Antibiotic: Rocephin     DVT Prophylaxis: Heparin    Wayen Cisneros PA-C     Patient  Chart reviewed and events noted. No new complaints other than stated above. Vitals noted. Chest CTAB. S1 S2 RRR. GI bening. Neuro no new deficits. Integumentary no rash. Labs noted. Agree with above assessment and plan. Will continue to follow.     Tan Bonilla MD  Hospitalist service  5/28/2019   1:59 PM

## 2019-05-28 NOTE — PLAN OF CARE
Nutrition Problem: Severe malnutrition  Intervention: Food and/or Nutrient Delivery: Start oral diet, Start ONS  Nutritional Goals: po intake 50%.   Weight stable or increase 1-3# week

## 2019-05-29 LAB
ALBUMIN SERPL-MCNC: 3.6 G/DL (ref 3.5–5.2)
ALP BLD-CCNC: 40 U/L (ref 35–104)
ALT SERPL-CCNC: 7 U/L (ref 5–33)
ANION GAP SERPL CALCULATED.3IONS-SCNC: 17 MMOL/L (ref 7–19)
AST SERPL-CCNC: 14 U/L (ref 5–32)
BILIRUB SERPL-MCNC: <0.2 MG/DL (ref 0.2–1.2)
BUN BLDV-MCNC: 19 MG/DL (ref 8–23)
CALCIUM SERPL-MCNC: 9.5 MG/DL (ref 8.2–9.6)
CHLORIDE BLD-SCNC: 106 MMOL/L (ref 98–111)
CO2: 15 MMOL/L (ref 22–29)
CREAT SERPL-MCNC: 1.7 MG/DL (ref 0.5–0.9)
GFR NON-AFRICAN AMERICAN: 28
GLUCOSE BLD-MCNC: 96 MG/DL (ref 74–109)
HCT VFR BLD CALC: 34.6 % (ref 37–47)
HEMOGLOBIN: 11.6 G/DL (ref 12–16)
MCH RBC QN AUTO: 32 PG (ref 27–31)
MCHC RBC AUTO-ENTMCNC: 33.5 G/DL (ref 33–37)
MCV RBC AUTO: 95.3 FL (ref 81–99)
PDW BLD-RTO: 12 % (ref 11.5–14.5)
PLATELET # BLD: 289 K/UL (ref 130–400)
PMV BLD AUTO: 9.7 FL (ref 9.4–12.3)
POTASSIUM SERPL-SCNC: 3.4 MMOL/L (ref 3.5–5)
RBC # BLD: 3.63 M/UL (ref 4.2–5.4)
SODIUM BLD-SCNC: 138 MMOL/L (ref 136–145)
TOTAL PROTEIN: 7.1 G/DL (ref 6.6–8.7)
WBC # BLD: 11.1 K/UL (ref 4.8–10.8)

## 2019-05-29 PROCEDURE — 6370000000 HC RX 637 (ALT 250 FOR IP): Performed by: PHYSICIAN ASSISTANT

## 2019-05-29 PROCEDURE — 80053 COMPREHEN METABOLIC PANEL: CPT

## 2019-05-29 PROCEDURE — 36415 COLL VENOUS BLD VENIPUNCTURE: CPT

## 2019-05-29 PROCEDURE — APPSS30 APP SPLIT SHARED TIME 16-30 MINUTES: Performed by: PHYSICIAN ASSISTANT

## 2019-05-29 PROCEDURE — 99232 SBSQ HOSP IP/OBS MODERATE 35: CPT | Performed by: FAMILY MEDICINE

## 2019-05-29 PROCEDURE — 85027 COMPLETE CBC AUTOMATED: CPT

## 2019-05-29 PROCEDURE — 2580000003 HC RX 258: Performed by: INTERNAL MEDICINE

## 2019-05-29 PROCEDURE — 1210000000 HC MED SURG R&B

## 2019-05-29 RX ADMIN — NIFEDIPINE 240 MG: 10 CAPSULE ORAL at 08:42

## 2019-05-29 RX ADMIN — CITALOPRAM HYDROBROMIDE 10 MG: 10 TABLET ORAL at 08:41

## 2019-05-29 RX ADMIN — Medication 10 ML: at 20:37

## 2019-05-29 ASSESSMENT — PAIN SCALES - GENERAL
PAINLEVEL_OUTOF10: 0
PAINLEVEL_OUTOF10: 0

## 2019-05-29 ASSESSMENT — PAIN - FUNCTIONAL ASSESSMENT: PAIN_FUNCTIONAL_ASSESSMENT: ACTIVITIES ARE NOT PREVENTED

## 2019-05-29 NOTE — PROGRESS NOTES
ken  · Ideal Body Wt: 115 lb (52.2 kg), % Ideal Body 85.7%  · BMI Classification: BMI <18.5 Underweight    Nutrition Interventions:   Continue current diet, Continue current ONS  Continued Inpatient Monitoring    Nutrition Evaluation:   · Evaluation: Progressing toward goals   · Goals: po intake 50%.   Weight stable or increase 1-3# week    · Monitoring: Meal Intake, Supplement Intake, Diet Tolerance, Skin Integrity, Weight, Pertinent Labs      Electronically signed by Erlinda Azar MS, RD, LD on 5/29/19 at 1:57 PM    Contact Number: [phone]

## 2019-05-29 NOTE — CARE COORDINATION
Per Pt's daughter, they are interested in seeing if Pt is appropriate for inpt rehab. Will need referral; PT/OT evals also.     Electronically signed by DEJA Lomas on 5/29/2019 at 3:43 PM

## 2019-05-29 NOTE — PROGRESS NOTES
[facility] Hospitalists    Patient:  Neha Caballero  YOB: 1927  Date of Service: 5/29/2019  MRN: [id number]   Acct: [de-identified]   Primary Care Physician: Wade Ross MD  Advance Directive: Full Code  Admit Date: 5/26/2019       Hospital Day: 3    CHIEF COMPLAINT Weakness    SUBJECTIVE:  Ms. Cali Jameson has no new complaints. She is fully oriented and knows she is at a hospital today but tells me she's confused as to why she is still in the hospital, no paranoia and much more calm today. Cumulative Hospital Course: \"The patient is a pleasant 80year old female with a PMH of hypertension who presented to the hospital with complaints of worsening weakness and decreased appetite over the last week. She reports having issues with poor appetite and weight loss for some time, apparently 40 lb weight loss in the last 6 months. She has been undergoing workup by PCP, with EGD and colonoscopy last week. She reported excessive output with GI prep but also has not been eating or drinking much. In the ER, she was found to have BRO, with a creatinine of 3.9 and BUN of 45, and abnormal urinalysis.     She was admitted to the hospital and placed on IVF's. PPI and chlorthalidone held. She is started on ceftriaxone for UTI, pending urine culture.      Vitamin D, 25-hydroxy noted to be 44.2, PTH noted to be 44.3. TSH is 1.47. Renal ultrasound obtained with no acute abnormality seen. \"    05/28/2019 Patient was noted to be confused. Repeat urine culture obtained, CT head negative. Ativan, Celexa, Diltiazem resumed. Review of Systems:   14 point review of systems is negative except as specifically addressed above.     Objective:   VITALS:  BP (!) 148/71   Pulse 101   Temp 97.9 °F (36.6 °C)   Resp 18   Ht 5' 3\" (1.6 m)   Wt 100 lb 12.8 oz (45.7 kg)   SpO2 95%   BMI 17.86 kg/m²   24HR INTAKE/OUTPUT:      Intake/Output Summary (Last 24 hours) at 5/29/2019 1113  Last data filed at 5/29/2019 0820  Gross per 24 hour Intake 1305.71 ml   Output 1500 ml   Net -194.29 ml     General appearance: alert, appears stated age, cooperative and no distress, sitting comfortably in bedside chair   Head: Normocephalic, without obvious abnormality, atraumatic  Eyes: conjunctivae/corneas clear. PERRL, EOM's intact. Ears: normal external ears and nose, throat without exudate  Neck: no adenopathy, no carotid bruit, no JVD, supple, symmetrical, trachea midline and thyroid not enlarged, symmetric, no tenderness/mass/nodules  Lungs: clear to auscultation bilaterally,no rales or wheezes   Heart: regular rate and rhythm, S1, S2 normal, I/VI systolic murmur  Abdomen:soft, non-tender; non-distended, normal bowel sounds no masses, no organomegaly  Extremities:No lower extremity edema,  No erythema, no tenderness to palpation  Skin: Skin color, texture, turgor normal. No rashes or lesions  Lymphatic: No palpable lymph node enlargment  Neurologic: Alert and oriented X 3. Answers questions appropriately. Follows commands.  No focal deficits  Psychiatric: Calm and pleasant to converse with     Medications:      sodium chloride 75 mL/hr at 05/27/19 1330      citalopram  10 mg Oral Daily    diltiazem  240 mg Oral Daily    sodium chloride flush  10 mL Intravenous 2 times per day    heparin (porcine)  5,000 Units Subcutaneous 3 times per day    cefTRIAXone (ROCEPHIN) IV  1 g Intravenous Q24H     ziprasidone, LORazepam, sodium chloride flush, ondansetron, acetaminophen, hydrALAZINE  DIET RENAL; Cardiac  Dietary Nutrition Supplements: Standard High Calorie Oral Supplement     Lab and other Data:     Recent Labs     05/27/19  0347 05/28/19  0250 05/29/19  0740   WBC 8.5 9.5 11.1*   HGB 10.4* 10.3* 11.6*    262 289     Recent Labs     05/27/19  0347 05/28/19  0250 05/29/19  0740    139 138   K 3.5 3.7 3.4*    105 106   CO2 20* 19* 15*   BUN 35* 31* 19   CREATININE 3.0* 2.8* 1.7*   GLUCOSE 97 106 96     Recent Labs     05/26/19  1630 05/29/19  0740   AST 11 14   ALT 8 7   BILITOT <0.2 <0.2   ALKPHOS 46 40     UA:  Recent Labs     05/26/19  1700   COLORU YELLOW   PHUR 7.5   WBCUA 6-10*   RBCUA 0-1   YEAST 1+*   BACTERIA 1+   CLARITYU CLOUDY*   SPECGRAV 1.012   LEUKOCYTESUR LARGE*   UROBILINOGEN 0.2   BILIRUBINUR Negative   BLOODU Negative   GLUCOSEU Negative   AMORPHOUS 1+*     RAD:   Us Renal Complete  The kidneys are sonographically normal for age. Signed by Dr Derian Clark on 5/27/2019 11:26 AM    CT head 05/28/2019  Mild cerebral and cerebellar volume loss with chronic microvascular  disease but no evidence of acute intracranial process. Micro: Urine culture no growth     Assessment/Plan   Principal Problem:    BRO (acute kidney injury) ([address]  Active Problems:    Essential hypertension    Gastroesophageal reflux disease without esophagitis    Acute cystitis without hematuria    Delirium  Resolved Problems:    * No resolved hospital problems. *     CT head negative, urine culture negative will stop Rocephin. Continue IVF's for now as creatinine has continued to improve but still not back to baseline. No obvious paranoia today and much more calm. Will discuss with daughter for discharge plans. Home vs assisted living. Likely DC in next 24-48 hrs if continued improvement. DVT Prophylaxis: Heparin    Ravinder Buitrago PA-C       Patient was examined independently. Chart reviewed and events noted. No new complaints other than stated above. Vitals noted. Chest CTAB. S1 S2 RRR. GI bening. Neuro no new deficits. Integumentary no rash. Labs noted. BRO improving  Delirium resolved, mildly disoriented    Probably home tomorrow    Agree with above assessment and plan. Will continue to follow.     Chelle Lawrence MD  Hospitalist service  5/29/2019   5:48 PM

## 2019-05-29 NOTE — PLAN OF CARE
Problem: Risk for Impaired Skin Integrity  Goal: Tissue integrity - skin and mucous membranes  Description  Structural intactness and normal physiological function of skin and  mucous membranes. Outcome: Ongoing     Problem: Falls - Risk of:  Goal: Will remain free from falls  Description  Will remain free from falls  Outcome: Ongoing  Goal: Absence of physical injury  Description  Absence of physical injury  Outcome: Ongoing     Problem: ABCDS Injury Assessment  Goal: Absence of physical injury  Outcome: Ongoing     Problem: Nutrition  Goal: Optimal nutrition therapy  Description  Nutrition Problem: Severe malnutrition  Intervention: Food and/or Nutrient Delivery: Start oral diet, Start ONS  Nutritional Goals: po intake 50%.   Weight stable or increase 1-3# week     5/29/2019 0113 by Nupur Bailey RN  Outcome: Ongoing  5/28/2019 1447 by Govind Reed, MS, RD, LD  Outcome: Ongoing

## 2019-05-30 LAB
ANION GAP SERPL CALCULATED.3IONS-SCNC: 16 MMOL/L (ref 7–19)
BUN BLDV-MCNC: 24 MG/DL (ref 8–23)
CALCIUM SERPL-MCNC: 10.3 MG/DL (ref 8.2–9.6)
CHLORIDE BLD-SCNC: 102 MMOL/L (ref 98–111)
CO2: 17 MMOL/L (ref 22–29)
CREAT SERPL-MCNC: 1.8 MG/DL (ref 0.5–0.9)
GFR NON-AFRICAN AMERICAN: 26
GLUCOSE BLD-MCNC: 118 MG/DL (ref 74–109)
POTASSIUM SERPL-SCNC: 5.3 MMOL/L (ref 3.5–5)
SODIUM BLD-SCNC: 135 MMOL/L (ref 136–145)
URINE CULTURE, ROUTINE: NORMAL

## 2019-05-30 PROCEDURE — 97750 PHYSICAL PERFORMANCE TEST: CPT

## 2019-05-30 PROCEDURE — 6370000000 HC RX 637 (ALT 250 FOR IP): Performed by: FAMILY MEDICINE

## 2019-05-30 PROCEDURE — 99232 SBSQ HOSP IP/OBS MODERATE 35: CPT | Performed by: PHYSICIAN ASSISTANT

## 2019-05-30 PROCEDURE — 2580000003 HC RX 258: Performed by: FAMILY MEDICINE

## 2019-05-30 PROCEDURE — 80048 BASIC METABOLIC PNL TOTAL CA: CPT

## 2019-05-30 PROCEDURE — 36415 COLL VENOUS BLD VENIPUNCTURE: CPT

## 2019-05-30 PROCEDURE — 6370000000 HC RX 637 (ALT 250 FOR IP): Performed by: PHYSICIAN ASSISTANT

## 2019-05-30 PROCEDURE — 1210000000 HC MED SURG R&B

## 2019-05-30 PROCEDURE — 97165 OT EVAL LOW COMPLEX 30 MIN: CPT

## 2019-05-30 PROCEDURE — 97161 PT EVAL LOW COMPLEX 20 MIN: CPT

## 2019-05-30 PROCEDURE — 97535 SELF CARE MNGMENT TRAINING: CPT

## 2019-05-30 RX ORDER — CLONIDINE HYDROCHLORIDE 0.1 MG/1
0.1 TABLET ORAL 2 TIMES DAILY
Status: DISCONTINUED | OUTPATIENT
Start: 2019-05-30 | End: 2019-05-31 | Stop reason: HOSPADM

## 2019-05-30 RX ORDER — SODIUM CHLORIDE 9 MG/ML
INJECTION, SOLUTION INTRAVENOUS CONTINUOUS
Status: DISCONTINUED | OUTPATIENT
Start: 2019-05-30 | End: 2019-05-31 | Stop reason: HOSPADM

## 2019-05-30 RX ADMIN — CLONIDINE HYDROCHLORIDE 0.1 MG: 0.1 TABLET ORAL at 20:39

## 2019-05-30 RX ADMIN — SODIUM CHLORIDE: 9 INJECTION, SOLUTION INTRAVENOUS at 18:04

## 2019-05-30 RX ADMIN — NIFEDIPINE 240 MG: 10 CAPSULE ORAL at 08:36

## 2019-05-30 RX ADMIN — CITALOPRAM HYDROBROMIDE 10 MG: 10 TABLET ORAL at 08:36

## 2019-05-30 ASSESSMENT — PAIN SCALES - GENERAL
PAINLEVEL_OUTOF10: 0
PAINLEVEL_OUTOF10: 0

## 2019-05-30 NOTE — PROGRESS NOTES
Mirella Viridiana for disaster relief more than once a year       Objective   Vision Exceptions: Wears glasses at all times  Hearing: Within functional limits    Orientation  Overall Orientation Status: Impaired  Orientation Level: Oriented to person;Oriented to situation;Oriented to time     Balance  Sitting Balance: Independent  Standing Balance: Independent  Functional Mobility  Functional - Mobility Device: No device  Assist Level: Independent  Toilet Transfers  Toilet Transfer: Independent  ADL  Feeding: Independent  Grooming: Independent  LE Bathing: Modified independent ;Supervision  UE Dressing: Independent  LE Dressing: Independent  Toileting: Independent           Transfers  Stand Step Transfers: Independent     Cognition  Cognition Comment: Awake, alert, following directions. Interacts appropriately on a social level. LUE PROM (degrees)  LUE PROM: WNL  LUE AROM (degrees)  LUE AROM : WNL  RUE PROM (degrees)  RUE PROM: WNL  RUE AROM (degrees)  RUE AROM : WNL  LUE Strength  Gross LUE Strength: WNL  RUE Strength  Gross RUE Strength:  WNL                   Plan   Plan  Plan Comment: No further visits planned    G-Code     OutComes Score                                                  AM-PAC Score             Goals  Short term goals  Short term goal 1: Patient will verbalize home recommendations(met)       Therapy Time   Individual Concurrent Group Co-treatment   Time In           Time Out           Minutes                   Sammie Oliveira OT Electronically signed by Sammie Oliveira OT on 5/30/2019 at 10:21 AM

## 2019-05-30 NOTE — CARE COORDINATION
Spoke with patient regarding MD orders for [facility] services. Patient agreeable and has chosen [facility]. Referral Faxed. [address] [place] [address] [phone]. [phone]. Please notify [address] [place] [address] when patient discharges and fax DC Summary,  DC med list and any new [facility] orders.   Electronically signed by Tatiana Pacheco RN on 5/30/2019 at 4:19 PM

## 2019-05-30 NOTE — PROGRESS NOTES
[facility]    Patient:  Tessa Mckeon  YOB: 1927  Date of Service: 5/30/2019  MRN: [id number]   Acct: [de-identified]   Primary Care Physician: Coco Rodney MD  Advance Directive: Full Code  Admit Date: 5/26/2019       Hospital Day: 4    CHIEF COMPLAINT Weakness    SUBJECTIVE:  Ms. Dumont has no new complaints. Denies CP, N/V or diarrhea. Cumulative Hospital Course: \"The patient is a pleasant 80year old female with a PMH of hypertension who presented to the hospital with complaints of worsening weakness and decreased appetite over the last week. She reports having issues with poor appetite and weight loss for some time, apparently 40 lb weight loss in the last 6 months. She has been undergoing workup by PCP, with EGD and colonoscopy last week. She reported excessive output with GI prep but also has not been eating or drinking much. In the ER, she was found to have BRO, with a creatinine of 3.9 and BUN of 45, and abnormal urinalysis.     She was admitted to the hospital and placed on IVF's. PPI and chlorthalidone held. She is started on ceftriaxone for UTI, pending urine culture.      Vitamin D, 25-hydroxy noted to be 44.2, PTH noted to be 44.3. TSH is 1.47. Renal ultrasound obtained with no acute abnormality seen. \"    05/28/2019 Patient was noted to be confused. Repeat urine culture obtained, CT head negative. Ativan, Celexa, Diltiazem resumed. Patient mental status is now back to baseline according to her daughter. Review of Systems:   14 point review of systems is negative except as specifically addressed above.     Objective:   VITALS:  /64   Pulse 105   Temp 98.2 °F (36.8 °C)   Resp 18   Ht 5' 3\" (1.6 m)   Wt 97 lb 12.8 oz (44.4 kg)   SpO2 95%   BMI 17.32 kg/m²   24HR INTAKE/OUTPUT:      Intake/Output Summary (Last 24 hours) at 5/30/2019 0759  Last data filed at 5/29/2019 2037  Gross per 24 hour   Intake 970 ml   Output 800 ml   Net 170 ml     General appearance: chronic microvascular  disease but no evidence of acute intracranial process. Micro: Urine culture no growth     Assessment/Plan   Principal Problem:    BRO (acute kidney injury) ([facility] [address]  Active Problems:    Essential hypertension    Gastroesophageal reflux disease without esophagitis    Acute cystitis without hematuria    Delirium    Acute renal failure ([facility] [address]  Resolved Problems:    * No resolved hospital problems. *    Patient and daughter hopeful for inpatient rehab which was denied this admission. Awaiting to hear back from [address]. Continue IVF's for now, change diet to low potassium. Further orders per clinical course/attending.     DVT Prophylaxis: Heparin    Seferino Robertson PA-C

## 2019-05-30 NOTE — PROGRESS NOTES
(degrees)  RLE AROM: WNL  AROM LLE (degrees)  LLE AROM : WNL  Strength RLE  Strength RLE: WFL  Strength LLE  Strength LLE: WFL        Bed mobility  Supine to Sit: Independent  Sit to Supine: Independent  Transfers  Sit to Stand: Independent  Bed to Chair: Independent  Ambulation 1  Assistance: Supervision; Independent  Quality of Gait: NO LOB, ABLE TO CARRY ON CONVERSATION AND MAKE HEAD TURNS WHILE WALKING  Distance: 300'     Balance  Sitting - Dynamic: Good  Standing - Dynamic: Good        Plan        G-Code       OutComes Score                                                  AM-PAC Score             Goals          Therapy Time   Individual Concurrent Group Co-treatment   Time In           Time Out           Minutes                   Payton De Leon, PT

## 2019-05-30 NOTE — DISCHARGE SUMMARY
Annmarie Flowers  :  1927  MRN:  [id number]    Admit date:  2019  Discharge date:      Discharging Physician:  Juany Longoria MD    Advance Directive: Full Code    Consults: None    Primary Care Physician:  Erick Ward MD    Discharge Diagnoses:    Principal Problem:    BRO (acute kidney injury) [facility])  Active Problems:    Essential hypertension    Gastroesophageal reflux disease without esophagitis    Acute cystitis without hematuria    Acute renal failure (HCC)    Severe malnutrition (HCC)    CKD (chronic kidney disease) stage 3, GFR 30-59 ml/min ([facility])  Resolved Problems:    Delirium    Significant Diagnostic Studies:   Us Renal Complete  The kidneys are sonographically normal for age. Signed by Dr Senior [facility] on 2019 11:26 AM    Pertinent Labs:   CBC:   Recent Labs     19  0740 19  0813   WBC 11.1* 9.9   HGB 11.6* 11.4*    273     BMP:    Recent Labs     19  0740 19  1029 19  0813    135* 140   K 3.4* 5.3* 3.4*    102 105   CO2 15* 17* 14*   BUN 19 24* 26*   CREATININE 1.7* 1.8* 1.7*   GLUCOSE 96 118* 94     Hospital Course:    Ms. Bibi Vora is a pleasant 80year old female with PMH of hypertension who presented to [facility] with worsening weakness and decreased appetite for the last several months. She recently was treated for diverticulitis and underwent colonoscopy with excessive output from GI prep in addition to diminished oral intake. She was found to have BRO with creatinine 3.9 and BUN 45. She was admitted to [facility] and placed on IVF's. PPI, chlorthalidone, Ativan and Celexa had been held. She was started on Rocephin for possible UTI. Urine culture was negative. On 2019 Ms. Bibi Vora was noted to be confused and paranoid and per daughter has no known underlying dementia. CT head was negative. Repeat culture was negative. Ativan and Celexa were resumed and patient's confusion was much improved the next day. At this time Ms. Miles Smoker is stable for discharge home with home health with follow up lab work and follow up from PCP to monitor creatinine. Physical Exam:  Vital Signs: BP (!) 131/66   Pulse 89   Temp 96.8 °F (36 °C)   Resp 16   Ht 5' 3\" (1.6 m)   Wt 93 lb 11.2 oz (42.5 kg)   SpO2 100%   BMI 16.60 kg/m²   General appearance:. Alert and Cooperative   HEENT: Normocephalic. Chest: clear to auscultation bilaterally without wheezes or rhonchi. Cardiac: Normal heart tones with regular rate and rhythm, S1, S2 normal. No murmurs, gallops, or rubs auscultated. Abdomen:soft, non-tender; non-distended normal bowel sounds no masses, no organomegaly. Extremities: No clubbing or cyanosis. No peripheral edema. Peripheral pulses palpable. Neurologic: Grossly intact. Discharge Medications:       Evangelina Sameera   Home Medication Instructions BPN:[id number]    Printed on:05/31/19 2728   Medication Information                      citalopram (CELEXA) 10 MG tablet  Take 10 mg by mouth daily             cloNIDine (CATAPRES) 0.1 MG tablet  Take 0.1 mg by mouth 2 times daily             diltiazem (DILACOR XR) 240 MG extended release capsule  Take 240 mg by mouth daily             LORazepam (ATIVAN) 0.5 MG tablet  Take 0.5 mg by mouth every 6 hours as needed. Multiple Vitamins-Minerals (THERAPEUTIC MULTIVITAMIN-MINERALS) tablet  Take 1 tablet by mouth daily             pantoprazole sodium (PROTONIX) 40 MG PACK packet  Take 40 mg by mouth every morning (before breakfast)             Medication reconciliation completed by attending physician at the time of discharge. Discharge Instructions: Follow up with Graham Washington MD in 3-5 days. Take medications as directed. Resume activity as tolerated. Diet: Dietary Nutrition Supplements: Clear Liquid Oral Supplement  DIET RENAL; Cardiac, Low Potassium     Disposition: Patient is medically stable and will be discharged home with home health.     Time spent on discharge . Signed:  Seferino Robertson PA-C     Patient was examined and interviewed independently. Chart reviewed and events noted. No new complaints other than stated above. Vitals noted. Chest CTAB. S1 S2 RRR. GI benign. Neuro no new deficits. Integumentary no rash. Labs noted. Agree with above assessment and plan. Discharge to home with home health care  Discharge time 36 minutes, including chart review, counseling, medication reconciliation, case discussion, patient exam, follow up and discharge planning.      Rancho Ramirez MD  Hospitalist service  5/31/2019  1:04 PM

## 2019-05-30 NOTE — CARE COORDINATION
The [address] at [facility]  Notification of Admission Decision      [] Patient has been accepted for admit to [facility] on :       Please write discharge orders and summary prior to discharge. [] Patient acceptance to Rehab pending the following :    [] Eval in progress       [x] Patient determined to be ineligible for services at [facility] because : Per PT/OT patient is independent therefore doesn't qualify for inpatient Rehab. We recommend you consider: Home, could have [facility] to a home safety eval if MD feels needed. Thank you for your referral, we appreciate you.     Electronically Signed by Merrick Colón Admissions Coordinator 5/30/2019 10:37 AM

## 2019-05-31 VITALS
BODY MASS INDEX: 16.6 KG/M2 | HEART RATE: 100 BPM | TEMPERATURE: 96.8 F | OXYGEN SATURATION: 100 % | RESPIRATION RATE: 16 BRPM | DIASTOLIC BLOOD PRESSURE: 68 MMHG | SYSTOLIC BLOOD PRESSURE: 149 MMHG | WEIGHT: 93.7 LBS | HEIGHT: 63 IN

## 2019-05-31 PROBLEM — E43 SEVERE MALNUTRITION (HCC): Status: ACTIVE | Noted: 2019-05-31

## 2019-05-31 PROBLEM — N18.30 CKD (CHRONIC KIDNEY DISEASE) STAGE 3, GFR 30-59 ML/MIN (HCC): Status: ACTIVE | Noted: 2019-05-31

## 2019-05-31 PROBLEM — R41.0 DELIRIUM: Status: RESOLVED | Noted: 2019-05-28 | Resolved: 2019-05-31

## 2019-05-31 LAB
ANION GAP SERPL CALCULATED.3IONS-SCNC: 21 MMOL/L (ref 7–19)
BUN BLDV-MCNC: 26 MG/DL (ref 8–23)
CALCIUM SERPL-MCNC: 10.4 MG/DL (ref 8.2–9.6)
CHLORIDE BLD-SCNC: 105 MMOL/L (ref 98–111)
CO2: 14 MMOL/L (ref 22–29)
CREAT SERPL-MCNC: 1.7 MG/DL (ref 0.5–0.9)
GFR NON-AFRICAN AMERICAN: 28
GLUCOSE BLD-MCNC: 94 MG/DL (ref 74–109)
HCT VFR BLD CALC: 36.2 % (ref 37–47)
HEMOGLOBIN: 11.4 G/DL (ref 12–16)
MCH RBC QN AUTO: 30.4 PG (ref 27–31)
MCHC RBC AUTO-ENTMCNC: 31.5 G/DL (ref 33–37)
MCV RBC AUTO: 96.5 FL (ref 81–99)
PDW BLD-RTO: 12 % (ref 11.5–14.5)
PLATELET # BLD: 273 K/UL (ref 130–400)
PMV BLD AUTO: 10 FL (ref 9.4–12.3)
POTASSIUM SERPL-SCNC: 3.4 MMOL/L (ref 3.5–5)
RBC # BLD: 3.75 M/UL (ref 4.2–5.4)
SODIUM BLD-SCNC: 140 MMOL/L (ref 136–145)
WBC # BLD: 9.9 K/UL (ref 4.8–10.8)

## 2019-05-31 PROCEDURE — 6370000000 HC RX 637 (ALT 250 FOR IP): Performed by: FAMILY MEDICINE

## 2019-05-31 PROCEDURE — 6370000000 HC RX 637 (ALT 250 FOR IP): Performed by: PHYSICIAN ASSISTANT

## 2019-05-31 PROCEDURE — 80048 BASIC METABOLIC PNL TOTAL CA: CPT

## 2019-05-31 PROCEDURE — 99239 HOSP IP/OBS DSCHRG MGMT >30: CPT | Performed by: FAMILY MEDICINE

## 2019-05-31 PROCEDURE — APPSS45 APP SPLIT SHARED TIME 31-45 MINUTES: Performed by: PHYSICIAN ASSISTANT

## 2019-05-31 PROCEDURE — 2580000003 HC RX 258: Performed by: INTERNAL MEDICINE

## 2019-05-31 PROCEDURE — 99232 SBSQ HOSP IP/OBS MODERATE 35: CPT | Performed by: HOSPITALIST

## 2019-05-31 PROCEDURE — 85027 COMPLETE CBC AUTOMATED: CPT

## 2019-05-31 PROCEDURE — 36415 COLL VENOUS BLD VENIPUNCTURE: CPT

## 2019-05-31 RX ADMIN — CLONIDINE HYDROCHLORIDE 0.1 MG: 0.1 TABLET ORAL at 09:47

## 2019-05-31 RX ADMIN — CITALOPRAM HYDROBROMIDE 10 MG: 10 TABLET ORAL at 09:47

## 2019-05-31 RX ADMIN — NIFEDIPINE 240 MG: 10 CAPSULE ORAL at 09:47

## 2019-05-31 RX ADMIN — Medication 10 ML: at 09:47

## 2019-05-31 ASSESSMENT — PAIN SCALES - GENERAL
PAINLEVEL_OUTOF10: 0
PAINLEVEL_OUTOF10: 0

## 2019-05-31 NOTE — DISCHARGE INSTR - DIET

## 2019-06-03 ENCOUNTER — CARE COORDINATION (OUTPATIENT)
Dept: CASE MANAGEMENT | Age: 84
End: 2019-06-03

## 2019-06-03 DIAGNOSIS — N17.9 AKI (ACUTE KIDNEY INJURY) (HCC): Primary | ICD-10-CM

## 2019-06-03 PROCEDURE — 1111F DSCHRG MED/CURRENT MED MERGE: CPT | Performed by: PEDIATRICS

## 2019-06-03 NOTE — CARE COORDINATION
[facility] Transitions Initial Follow Up Call    Call within 2 business days of discharge: Yes    Patient: Carli Nava Patient : 1927   MRN: [id number]   Discharge Date: 19 RARS: Readmission Risk Score: 16      Last Discharge [facility]       Complaint Diagnosis Description Type Department Provider    19 Dehydration Acute renal failure, unspecified acute renal failure type ([facility] [address] . .. ED to Hosp-Admission (Discharged) (ADMITTED) [facility] MED [address] [place] [address]; Amy Fowler, ... Spoke with: Avani 1579: Bárbara Reeves      Non-face-to-face services provided:  Reviewed encounter information for continuity of care prior to follow up phone call - chart notes, consults, progress notes, test results, med list, appointments, AVS, other information. Care Transitions 24 Hour Call    Schedule Follow Up Appointment with PCP:  Completed  Do you have any ongoing symptoms?:  No  Do you have a copy of your discharge instructions?:  Yes  Do you have all of your prescriptions and are they filled?:  Yes  Have you scheduled your follow up appointment?:  Yes  How are you going to get to your appointment?:  Car - family or friend to transport  Were you discharged with any Home Care or Post Acute Services:  Yes  Post Acute Services:  Home Health  Patient DME:  Straight cane  Do you have support at home?:  Alone  Do you feel like you have everything you need to keep you well at home?:  Yes  Are you an active caregiver in your home?:  No  Care Transitions Interventions         Follow Up: Placed a call back to patient. She reported that she is doing very well. 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 She has been very busy today and is tired. She said she is doing well for the most part, but mostly just weak and tired. She said that home health is planning to have therapy to come out to work with her too.   No new complaints. Did a med review. She has a followup appointment scheduled with her PCP. She is drinking plenty of fluids. She is eating well. She reported that she is up and about and trying to be active. No new issues. To call prn issues, questions. Will follow as indicated. No future appointments.     Arnol Gibson RN

## 2019-06-03 NOTE — CARE COORDINATION
[facility] Transitions Initial Follow Up Call    Call within 2 business days of discharge: Yes    Patient: Herminio Turcios Patient : 1927   MRN: [id number]    Discharge Date: 19 RARS: Readmission Risk Score: 16      Last Discharge [facility]       Complaint Diagnosis Description Type Department Provider    19 Dehydration Acute renal failure, unspecified acute renal failure type ([facility] [address] . .. ED to Hosp-Admission (Discharged) (ADMITTED) [facility] Erlinda Johnson MD; Gaby Salinas,... Spoke with: N/A    Facility: [place] [id number]    Non-face-to-face services provided:  Reviewed encounter information for continuity of care prior to follow up phone call - chart notes, consults, progress notes, test results, med list, appointments, AVS, other information. Care Transitions 24 Hour Call    Do you have all of your prescriptions and are they filled?:  Yes  Patient DME:  Straight cane  Do you have support at home?:  Alone  Are you an active caregiver in your home?:  No  Care Transitions Interventions         Follow Up: Attempted to make contact with patient for an initial follow up call post discharge from the hospital without success. Unable to leave a message regarding intent of call and call back information. Will try again my next business day. No future appointments.     Deann Degroot RN

## 2019-06-06 ENCOUNTER — CARE COORDINATION (OUTPATIENT)
Dept: CASE MANAGEMENT | Age: 84
End: 2019-06-06

## 2019-06-06 NOTE — CARE COORDINATION
[facility] Transitions Follow Up Call    2019    Patient: Michelle Pollack  Patient : 1927   MRN: [id number]    Discharge Date: 19 RARS: Readmission Risk Score: 16         Spoke with: [facility] Transitions Subsequent and Final Call    Subsequent and Final Calls  Are you currently active with any services?:  Home Health  Care Transitions Interventions  Other Interventions: Follow Up: Placed a call to the home and checked on patient today. She reported that she is feeling much better. She said she is getting stronger. She is eating well, drinking well. She said that some friends brought her bbq last night and she has been eating on it off and on all day. She denied any complaints or issues. Reported that she is doing well. No future appointments.     Patricia Mcmahon RN

## 2019-06-10 ENCOUNTER — LAB (OUTPATIENT)
Dept: LAB | Facility: HOSPITAL | Age: 84
End: 2019-06-10

## 2019-06-10 ENCOUNTER — TRANSCRIBE ORDERS (OUTPATIENT)
Dept: ADMINISTRATIVE | Facility: HOSPITAL | Age: 84
End: 2019-06-10

## 2019-06-10 DIAGNOSIS — N39.0 URINARY TRACT INFECTION WITHOUT HEMATURIA, SITE UNSPECIFIED: Primary | ICD-10-CM

## 2019-06-10 DIAGNOSIS — E86.0 DEHYDRATION: ICD-10-CM

## 2019-06-10 DIAGNOSIS — N39.0 URINARY TRACT INFECTION WITHOUT HEMATURIA, SITE UNSPECIFIED: ICD-10-CM

## 2019-06-10 LAB
ANION GAP SERPL CALCULATED.3IONS-SCNC: 12 MMOL/L (ref 4–13)
BACTERIA UR QL AUTO: ABNORMAL /HPF
BILIRUB UR QL STRIP: NEGATIVE
BUN BLD-MCNC: 35 MG/DL (ref 5–21)
BUN/CREAT SERPL: 20.2
CALCIUM SPEC-SCNC: 9.2 MG/DL (ref 8.4–10.4)
CHLORIDE SERPL-SCNC: 98 MMOL/L (ref 98–110)
CLARITY UR: CLEAR
CO2 SERPL-SCNC: 30 MMOL/L (ref 24–31)
COLOR UR: YELLOW
CREAT BLD-MCNC: 1.73 MG/DL (ref 0.5–1.4)
GFR SERPL CREATININE-BSD FRML MDRD: 28 ML/MIN/1.73
GLUCOSE BLD-MCNC: 93 MG/DL (ref 70–100)
GLUCOSE UR STRIP-MCNC: NEGATIVE MG/DL
HGB UR QL STRIP.AUTO: NEGATIVE
HYALINE CASTS UR QL AUTO: ABNORMAL /LPF
KETONES UR QL STRIP: NEGATIVE
LEUKOCYTE ESTERASE UR QL STRIP.AUTO: ABNORMAL
NITRITE UR QL STRIP: NEGATIVE
PH UR STRIP.AUTO: 6 [PH] (ref 5–8)
POTASSIUM BLD-SCNC: 3 MMOL/L (ref 3.5–5.3)
PROT UR QL STRIP: ABNORMAL
RBC # UR: ABNORMAL /HPF
REF LAB TEST METHOD: ABNORMAL
SODIUM BLD-SCNC: 140 MMOL/L (ref 135–145)
SP GR UR STRIP: 1.02 (ref 1–1.03)
SQUAMOUS #/AREA URNS HPF: ABNORMAL /HPF
UROBILINOGEN UR QL STRIP: ABNORMAL
WBC UR QL AUTO: ABNORMAL /HPF

## 2019-06-10 PROCEDURE — 36415 COLL VENOUS BLD VENIPUNCTURE: CPT

## 2019-06-10 PROCEDURE — 87086 URINE CULTURE/COLONY COUNT: CPT | Performed by: PEDIATRICS

## 2019-06-10 PROCEDURE — 80048 BASIC METABOLIC PNL TOTAL CA: CPT

## 2019-06-10 PROCEDURE — 81001 URINALYSIS AUTO W/SCOPE: CPT

## 2019-06-12 LAB — BACTERIA SPEC AEROBE CULT: ABNORMAL

## 2019-06-13 ENCOUNTER — CARE COORDINATION (OUTPATIENT)
Dept: CASE MANAGEMENT | Age: 84
End: 2019-06-13

## 2019-06-13 NOTE — CARE COORDINATION
[facility] Transitions Follow Up Call    2019    Patient: Christina Quintero  Patient : 1927   MRN: [id number]  Reason for Admission:   Discharge Date: 19 RARS: Readmission Risk Score: 12         Spoke with: [id number] Transitions Subsequent and Final Call    Subsequent and Final Calls  Do you have any ongoing symptoms?:  No  Have your medications changed?:  No  Do you have any questions related to your medications?:  No  Do you currently have any active services?:  Yes  Are you currently active with any services?:  Home Health  Do you have any needs or concerns that I can assist you with?:  No  Identified Barriers:  None  Care Transitions Interventions  Other Interventions: Follow Up : Spoke with patient for follow up phone call. She states she is doing very well. She has home care that is coming out for Physical Therapy. 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 She says she is eating 3 good meals per day with some snacks, and she has now gained to 101.5 lbs. She says she is feeling so much better. Will discharge patient from [facility] services at this time, instructed her to call with any prn issues or concerns. No future appointments.     Nanci Aggarwal RN

## 2019-11-11 ENCOUNTER — APPOINTMENT (OUTPATIENT)
Dept: CT IMAGING | Age: 84
End: 2019-11-11
Payer: MEDICARE

## 2019-11-11 ENCOUNTER — HOSPITAL ENCOUNTER (EMERGENCY)
Age: 84
Discharge: HOME | End: 2019-11-11
Attending: EMERGENCY MEDICINE
Payer: MEDICARE

## 2019-11-11 ENCOUNTER — APPOINTMENT (OUTPATIENT)
Dept: GENERAL RADIOLOGY | Age: 84
End: 2019-11-11
Payer: MEDICARE

## 2019-11-11 VITALS
WEIGHT: 100 LBS | RESPIRATION RATE: 12 BRPM | OXYGEN SATURATION: 97 % | HEIGHT: 63 IN | DIASTOLIC BLOOD PRESSURE: 51 MMHG | SYSTOLIC BLOOD PRESSURE: 133 MMHG | BODY MASS INDEX: 17.72 KG/M2 | TEMPERATURE: 98.4 F | HEART RATE: 60 BPM

## 2019-11-11 DIAGNOSIS — F41.9 ANXIETY: Primary | ICD-10-CM

## 2019-11-11 DIAGNOSIS — R41.82 ALTERED MENTAL STATUS, UNSPECIFIED ALTERED MENTAL STATUS TYPE: ICD-10-CM

## 2019-11-11 DIAGNOSIS — I49.3 PVC (PREMATURE VENTRICULAR CONTRACTION): ICD-10-CM

## 2019-11-11 LAB
ALBUMIN SERPL-MCNC: 4.2 G/DL (ref 3.5–5.2)
ALP BLD-CCNC: 36 U/L (ref 35–104)
ALT SERPL-CCNC: 8 U/L (ref 5–33)
ANION GAP SERPL CALCULATED.3IONS-SCNC: 14 MMOL/L (ref 7–19)
AST SERPL-CCNC: 15 U/L (ref 5–32)
BASOPHILS ABSOLUTE: 0 K/UL (ref 0–0.2)
BASOPHILS RELATIVE PERCENT: 0.2 % (ref 0–1)
BILIRUB SERPL-MCNC: 0.3 MG/DL (ref 0.2–1.2)
BILIRUBIN URINE: NEGATIVE
BLOOD, URINE: NEGATIVE
BUN BLDV-MCNC: 24 MG/DL (ref 8–23)
CALCIUM SERPL-MCNC: 9.6 MG/DL (ref 8.2–9.6)
CHLORIDE BLD-SCNC: 98 MMOL/L (ref 98–111)
CLARITY: CLEAR
CO2: 26 MMOL/L (ref 22–29)
COLOR: YELLOW
CREAT SERPL-MCNC: 1.3 MG/DL (ref 0.5–0.9)
EOSINOPHILS ABSOLUTE: 0 K/UL (ref 0–0.6)
EOSINOPHILS RELATIVE PERCENT: 0.2 % (ref 0–5)
GFR NON-AFRICAN AMERICAN: 38
GLUCOSE BLD-MCNC: 120 MG/DL (ref 74–109)
GLUCOSE URINE: NEGATIVE MG/DL
HCT VFR BLD CALC: 35.6 % (ref 37–47)
HEMOGLOBIN: 12 G/DL (ref 12–16)
IMMATURE GRANULOCYTES #: 0 K/UL
KETONES, URINE: NEGATIVE MG/DL
LEUKOCYTE ESTERASE, URINE: NEGATIVE
LYMPHOCYTES ABSOLUTE: 1.2 K/UL (ref 1.1–4.5)
LYMPHOCYTES RELATIVE PERCENT: 18.5 % (ref 20–40)
MAGNESIUM: 1.7 MG/DL (ref 1.7–2.3)
MCH RBC QN AUTO: 31.3 PG (ref 27–31)
MCHC RBC AUTO-ENTMCNC: 33.7 G/DL (ref 33–37)
MCV RBC AUTO: 92.7 FL (ref 81–99)
MONOCYTES ABSOLUTE: 0.5 K/UL (ref 0–0.9)
MONOCYTES RELATIVE PERCENT: 7.8 % (ref 0–10)
NEUTROPHILS ABSOLUTE: 4.7 K/UL (ref 1.5–7.5)
NEUTROPHILS RELATIVE PERCENT: 73.1 % (ref 50–65)
NITRITE, URINE: NEGATIVE
PDW BLD-RTO: 12.5 % (ref 11.5–14.5)
PH UA: 6.5 (ref 5–8)
PLATELET # BLD: 186 K/UL (ref 130–400)
PMV BLD AUTO: 10.5 FL (ref 9.4–12.3)
POTASSIUM REFLEX MAGNESIUM: 3.4 MMOL/L (ref 3.5–5)
PROTEIN UA: ABNORMAL MG/DL
RBC # BLD: 3.84 M/UL (ref 4.2–5.4)
SODIUM BLD-SCNC: 138 MMOL/L (ref 136–145)
SPECIFIC GRAVITY UA: 1.02 (ref 1–1.03)
TOTAL PROTEIN: 7.1 G/DL (ref 6.6–8.7)
TROPONIN: <0.01 NG/ML (ref 0–0.03)
TSH SERPL DL<=0.05 MIU/L-ACNC: 1.58 UIU/ML (ref 0.27–4.2)
URINE REFLEX TO CULTURE: ABNORMAL
UROBILINOGEN, URINE: 1 E.U./DL
WBC # BLD: 6.4 K/UL (ref 4.8–10.8)

## 2019-11-11 PROCEDURE — 83735 ASSAY OF MAGNESIUM: CPT

## 2019-11-11 PROCEDURE — 80053 COMPREHEN METABOLIC PANEL: CPT

## 2019-11-11 PROCEDURE — 85025 COMPLETE CBC W/AUTO DIFF WBC: CPT

## 2019-11-11 PROCEDURE — 81003 URINALYSIS AUTO W/O SCOPE: CPT

## 2019-11-11 PROCEDURE — 71046 X-RAY EXAM CHEST 2 VIEWS: CPT

## 2019-11-11 PROCEDURE — 36415 COLL VENOUS BLD VENIPUNCTURE: CPT

## 2019-11-11 PROCEDURE — 70450 CT HEAD/BRAIN W/O DYE: CPT

## 2019-11-11 PROCEDURE — 84484 ASSAY OF TROPONIN QUANT: CPT

## 2019-11-11 PROCEDURE — 99285 EMERGENCY DEPT VISIT HI MDM: CPT

## 2019-11-11 PROCEDURE — 93005 ELECTROCARDIOGRAM TRACING: CPT | Performed by: EMERGENCY MEDICINE

## 2019-11-11 PROCEDURE — 84443 ASSAY THYROID STIM HORMONE: CPT

## 2019-11-11 RX ORDER — CHLORTHALIDONE 25 MG/1
25 TABLET ORAL 2 TIMES DAILY
COMMUNITY
End: 2020-02-23

## 2019-11-11 ASSESSMENT — ENCOUNTER SYMPTOMS
NAUSEA: 0
SHORTNESS OF BREATH: 0
VOMITING: 0

## 2019-11-12 LAB
EKG P AXIS: -25 DEGREES
EKG P-R INTERVAL: 142 MS
EKG Q-T INTERVAL: 392 MS
EKG QRS DURATION: 86 MS
EKG QTC CALCULATION (BAZETT): 414 MS
EKG T AXIS: 35 DEGREES

## 2019-11-12 PROCEDURE — 93010 ELECTROCARDIOGRAM REPORT: CPT | Performed by: INTERNAL MEDICINE

## 2019-11-20 ENCOUNTER — HOSPITAL ENCOUNTER (EMERGENCY)
Age: 84
Discharge: HOME | End: 2019-11-20
Attending: EMERGENCY MEDICINE
Payer: MEDICARE

## 2019-11-20 ENCOUNTER — APPOINTMENT (OUTPATIENT)
Dept: CT IMAGING | Age: 84
End: 2019-11-20
Payer: MEDICARE

## 2019-11-20 VITALS
SYSTOLIC BLOOD PRESSURE: 154 MMHG | RESPIRATION RATE: 19 BRPM | OXYGEN SATURATION: 95 % | TEMPERATURE: 98.2 F | BODY MASS INDEX: 18.07 KG/M2 | HEART RATE: 64 BPM | DIASTOLIC BLOOD PRESSURE: 61 MMHG | WEIGHT: 102 LBS

## 2019-11-20 DIAGNOSIS — F41.1 ANXIETY STATE: ICD-10-CM

## 2019-11-20 DIAGNOSIS — R42 DIZZINESS: Primary | ICD-10-CM

## 2019-11-20 LAB
ALBUMIN SERPL-MCNC: 3.9 G/DL (ref 3.5–5.2)
ALP BLD-CCNC: 32 U/L (ref 35–104)
ALT SERPL-CCNC: 8 U/L (ref 5–33)
ANION GAP SERPL CALCULATED.3IONS-SCNC: 12 MMOL/L (ref 7–19)
AST SERPL-CCNC: 14 U/L (ref 5–32)
BASOPHILS ABSOLUTE: 0 K/UL (ref 0–0.2)
BASOPHILS RELATIVE PERCENT: 0.3 % (ref 0–1)
BILIRUB SERPL-MCNC: <0.2 MG/DL (ref 0.2–1.2)
BILIRUBIN URINE: NEGATIVE
BLOOD, URINE: NEGATIVE
BUN BLDV-MCNC: 23 MG/DL (ref 8–23)
CALCIUM SERPL-MCNC: 9.4 MG/DL (ref 8.2–9.6)
CHLORIDE BLD-SCNC: 96 MMOL/L (ref 98–111)
CLARITY: ABNORMAL
CO2: 29 MMOL/L (ref 22–29)
COLOR: YELLOW
CREAT SERPL-MCNC: 1 MG/DL (ref 0.5–0.9)
EOSINOPHILS ABSOLUTE: 0.1 K/UL (ref 0–0.6)
EOSINOPHILS RELATIVE PERCENT: 1.2 % (ref 0–5)
GFR NON-AFRICAN AMERICAN: 52
GLUCOSE BLD-MCNC: 92 MG/DL (ref 74–109)
GLUCOSE URINE: NEGATIVE MG/DL
HCT VFR BLD CALC: 34.3 % (ref 37–47)
HEMOGLOBIN: 11.6 G/DL (ref 12–16)
IMMATURE GRANULOCYTES #: 0 K/UL
KETONES, URINE: NEGATIVE MG/DL
LEUKOCYTE ESTERASE, URINE: NEGATIVE
LYMPHOCYTES ABSOLUTE: 1.4 K/UL (ref 1.1–4.5)
LYMPHOCYTES RELATIVE PERCENT: 18.5 % (ref 20–40)
MCH RBC QN AUTO: 30.9 PG (ref 27–31)
MCHC RBC AUTO-ENTMCNC: 33.8 G/DL (ref 33–37)
MCV RBC AUTO: 91.5 FL (ref 81–99)
MONOCYTES ABSOLUTE: 0.6 K/UL (ref 0–0.9)
MONOCYTES RELATIVE PERCENT: 8.4 % (ref 0–10)
NEUTROPHILS ABSOLUTE: 5.3 K/UL (ref 1.5–7.5)
NEUTROPHILS RELATIVE PERCENT: 71.1 % (ref 50–65)
NITRITE, URINE: NEGATIVE
PDW BLD-RTO: 12.4 % (ref 11.5–14.5)
PH UA: 7 (ref 5–8)
PLATELET # BLD: 185 K/UL (ref 130–400)
PMV BLD AUTO: 11 FL (ref 9.4–12.3)
POTASSIUM SERPL-SCNC: 3.2 MMOL/L (ref 3.5–5)
PROTEIN UA: NEGATIVE MG/DL
RBC # BLD: 3.75 M/UL (ref 4.2–5.4)
SODIUM BLD-SCNC: 137 MMOL/L (ref 136–145)
SPECIFIC GRAVITY UA: 1.01 (ref 1–1.03)
TOTAL PROTEIN: 6.6 G/DL (ref 6.6–8.7)
URINE REFLEX TO CULTURE: ABNORMAL
UROBILINOGEN, URINE: 1 E.U./DL
WBC # BLD: 7.5 K/UL (ref 4.8–10.8)

## 2019-11-20 PROCEDURE — 81003 URINALYSIS AUTO W/O SCOPE: CPT

## 2019-11-20 PROCEDURE — 70450 CT HEAD/BRAIN W/O DYE: CPT

## 2019-11-20 PROCEDURE — 36415 COLL VENOUS BLD VENIPUNCTURE: CPT

## 2019-11-20 PROCEDURE — 85025 COMPLETE CBC W/AUTO DIFF WBC: CPT

## 2019-11-20 PROCEDURE — 99284 EMERGENCY DEPT VISIT MOD MDM: CPT

## 2019-11-20 PROCEDURE — 93005 ELECTROCARDIOGRAM TRACING: CPT | Performed by: EMERGENCY MEDICINE

## 2019-11-20 PROCEDURE — 80053 COMPREHEN METABOLIC PANEL: CPT

## 2019-11-20 RX ORDER — ALPRAZOLAM 0.25 MG/1
0.25 TABLET ORAL 3 TIMES DAILY PRN
Qty: 12 TABLET | Refills: 0 | Status: SHIPPED | OUTPATIENT
Start: 2019-11-20 | End: 2019-12-20

## 2019-11-22 LAB
EKG P AXIS: 60 DEGREES
EKG P-R INTERVAL: 148 MS
EKG Q-T INTERVAL: 386 MS
EKG QRS DURATION: 82 MS
EKG QTC CALCULATION (BAZETT): 415 MS
EKG T AXIS: 35 DEGREES

## 2019-11-22 PROCEDURE — 93010 ELECTROCARDIOGRAM REPORT: CPT | Performed by: INTERNAL MEDICINE

## 2019-11-28 ENCOUNTER — HOSPITAL ENCOUNTER (EMERGENCY)
Age: 84
Discharge: HOME | End: 2019-11-28
Attending: EMERGENCY MEDICINE
Payer: MEDICARE

## 2019-11-28 ENCOUNTER — APPOINTMENT (OUTPATIENT)
Dept: CT IMAGING | Age: 84
End: 2019-11-28
Payer: MEDICARE

## 2019-11-28 VITALS
DIASTOLIC BLOOD PRESSURE: 55 MMHG | HEIGHT: 63 IN | WEIGHT: 114 LBS | TEMPERATURE: 98.7 F | OXYGEN SATURATION: 96 % | HEART RATE: 75 BPM | SYSTOLIC BLOOD PRESSURE: 145 MMHG | BODY MASS INDEX: 20.2 KG/M2 | RESPIRATION RATE: 18 BRPM

## 2019-11-28 DIAGNOSIS — R11.2 NON-INTRACTABLE VOMITING WITH NAUSEA, UNSPECIFIED VOMITING TYPE: ICD-10-CM

## 2019-11-28 DIAGNOSIS — K59.00 CONSTIPATION, UNSPECIFIED CONSTIPATION TYPE: Primary | ICD-10-CM

## 2019-11-28 LAB
ALBUMIN SERPL-MCNC: 4.1 G/DL (ref 3.5–5.2)
ALP BLD-CCNC: 36 U/L (ref 35–104)
ALT SERPL-CCNC: 8 U/L (ref 5–33)
ANION GAP SERPL CALCULATED.3IONS-SCNC: 15 MMOL/L (ref 7–19)
AST SERPL-CCNC: 17 U/L (ref 5–32)
BASOPHILS ABSOLUTE: 0 K/UL (ref 0–0.2)
BASOPHILS RELATIVE PERCENT: 0.3 % (ref 0–1)
BILIRUB SERPL-MCNC: 0.3 MG/DL (ref 0.2–1.2)
BILIRUBIN URINE: NEGATIVE
BLOOD, URINE: NEGATIVE
BUN BLDV-MCNC: 16 MG/DL (ref 8–23)
CALCIUM SERPL-MCNC: 9.5 MG/DL (ref 8.2–9.6)
CHLORIDE BLD-SCNC: 96 MMOL/L (ref 98–111)
CLARITY: CLEAR
CO2: 27 MMOL/L (ref 22–29)
COLOR: YELLOW
CREAT SERPL-MCNC: 0.9 MG/DL (ref 0.5–0.9)
EOSINOPHILS ABSOLUTE: 0 K/UL (ref 0–0.6)
EOSINOPHILS RELATIVE PERCENT: 0.5 % (ref 0–5)
GFR NON-AFRICAN AMERICAN: 58
GLUCOSE BLD-MCNC: 137 MG/DL (ref 74–109)
GLUCOSE URINE: NEGATIVE MG/DL
HCT VFR BLD CALC: 37.9 % (ref 37–47)
HEMOGLOBIN: 12.8 G/DL (ref 12–16)
IMMATURE GRANULOCYTES #: 0 K/UL
KETONES, URINE: NEGATIVE MG/DL
LACTIC ACID: 1.1 MMOL/L (ref 0.5–1.9)
LEUKOCYTE ESTERASE, URINE: NEGATIVE
LIPASE: 25 U/L (ref 13–60)
LYMPHOCYTES ABSOLUTE: 1.4 K/UL (ref 1.1–4.5)
LYMPHOCYTES RELATIVE PERCENT: 20.9 % (ref 20–40)
MAGNESIUM: 1.6 MG/DL (ref 1.7–2.3)
MCH RBC QN AUTO: 31.1 PG (ref 27–31)
MCHC RBC AUTO-ENTMCNC: 33.8 G/DL (ref 33–37)
MCV RBC AUTO: 92.2 FL (ref 81–99)
MONOCYTES ABSOLUTE: 0.4 K/UL (ref 0–0.9)
MONOCYTES RELATIVE PERCENT: 5.9 % (ref 0–10)
NEUTROPHILS ABSOLUTE: 4.7 K/UL (ref 1.5–7.5)
NEUTROPHILS RELATIVE PERCENT: 72.2 % (ref 50–65)
NITRITE, URINE: NEGATIVE
PDW BLD-RTO: 12.6 % (ref 11.5–14.5)
PH UA: 7.5 (ref 5–8)
PLATELET # BLD: 173 K/UL (ref 130–400)
PMV BLD AUTO: 10.7 FL (ref 9.4–12.3)
POTASSIUM REFLEX MAGNESIUM: 3.4 MMOL/L (ref 3.5–5)
PROTEIN UA: NEGATIVE MG/DL
RBC # BLD: 4.11 M/UL (ref 4.2–5.4)
SODIUM BLD-SCNC: 138 MMOL/L (ref 136–145)
SPECIFIC GRAVITY UA: 1.02 (ref 1–1.03)
TOTAL PROTEIN: 7.1 G/DL (ref 6.6–8.7)
URINE REFLEX TO CULTURE: NORMAL
UROBILINOGEN, URINE: 0.2 E.U./DL
WBC # BLD: 6.5 K/UL (ref 4.8–10.8)

## 2019-11-28 PROCEDURE — 96374 THER/PROPH/DIAG INJ IV PUSH: CPT

## 2019-11-28 PROCEDURE — 83735 ASSAY OF MAGNESIUM: CPT

## 2019-11-28 PROCEDURE — 99284 EMERGENCY DEPT VISIT MOD MDM: CPT

## 2019-11-28 PROCEDURE — 81003 URINALYSIS AUTO W/O SCOPE: CPT

## 2019-11-28 PROCEDURE — 83605 ASSAY OF LACTIC ACID: CPT

## 2019-11-28 PROCEDURE — 6360000002 HC RX W HCPCS: Performed by: EMERGENCY MEDICINE

## 2019-11-28 PROCEDURE — 74177 CT ABD & PELVIS W/CONTRAST: CPT

## 2019-11-28 PROCEDURE — 36415 COLL VENOUS BLD VENIPUNCTURE: CPT

## 2019-11-28 PROCEDURE — 6360000004 HC RX CONTRAST MEDICATION: Performed by: EMERGENCY MEDICINE

## 2019-11-28 PROCEDURE — 83690 ASSAY OF LIPASE: CPT

## 2019-11-28 PROCEDURE — 80053 COMPREHEN METABOLIC PANEL: CPT

## 2019-11-28 PROCEDURE — 2580000003 HC RX 258: Performed by: EMERGENCY MEDICINE

## 2019-11-28 PROCEDURE — 85025 COMPLETE CBC W/AUTO DIFF WBC: CPT

## 2019-11-28 RX ORDER — SODIUM CHLORIDE 9 MG/ML
1000 INJECTION, SOLUTION INTRAVENOUS CONTINUOUS
Status: DISCONTINUED | OUTPATIENT
Start: 2019-11-28 | End: 2019-11-28 | Stop reason: HOSPADM

## 2019-11-28 RX ORDER — ONDANSETRON 2 MG/ML
4 INJECTION INTRAMUSCULAR; INTRAVENOUS ONCE
Status: COMPLETED | OUTPATIENT
Start: 2019-11-28 | End: 2019-11-28

## 2019-11-28 RX ORDER — ONDANSETRON 4 MG/1
4 TABLET, ORALLY DISINTEGRATING ORAL EVERY 8 HOURS PRN
Qty: 15 TABLET | Refills: 0 | Status: SHIPPED | OUTPATIENT
Start: 2019-11-28 | End: 2020-07-23

## 2019-11-28 RX ADMIN — IOPAMIDOL 90 ML: 755 INJECTION, SOLUTION INTRAVENOUS at 14:31

## 2019-11-28 RX ADMIN — ONDANSETRON 4 MG: 2 INJECTION INTRAMUSCULAR; INTRAVENOUS at 13:49

## 2019-11-28 RX ADMIN — SODIUM CHLORIDE 1000 ML: 9 INJECTION, SOLUTION INTRAVENOUS at 13:49

## 2019-11-28 ASSESSMENT — ENCOUNTER SYMPTOMS
SHORTNESS OF BREATH: 0
ABDOMINAL PAIN: 1
VOMITING: 1
NAUSEA: 1
CONSTIPATION: 0
DIARRHEA: 0

## 2019-11-28 ASSESSMENT — PAIN SCALES - GENERAL: PAINLEVEL_OUTOF10: 8

## 2019-12-07 ENCOUNTER — HOSPITAL ENCOUNTER (EMERGENCY)
Age: 84
Discharge: HOME | End: 2019-12-07
Attending: EMERGENCY MEDICINE
Payer: MEDICARE

## 2019-12-07 ENCOUNTER — APPOINTMENT (OUTPATIENT)
Dept: GENERAL RADIOLOGY | Age: 84
End: 2019-12-07
Payer: MEDICARE

## 2019-12-07 VITALS
RESPIRATION RATE: 14 BRPM | TEMPERATURE: 98.4 F | OXYGEN SATURATION: 93 % | BODY MASS INDEX: 18.07 KG/M2 | WEIGHT: 102 LBS | DIASTOLIC BLOOD PRESSURE: 62 MMHG | HEART RATE: 72 BPM | HEIGHT: 63 IN | SYSTOLIC BLOOD PRESSURE: 151 MMHG

## 2019-12-07 DIAGNOSIS — R07.89 CHEST DISCOMFORT: Primary | ICD-10-CM

## 2019-12-07 LAB
ALBUMIN SERPL-MCNC: 4.1 G/DL (ref 3.5–5.2)
ALP BLD-CCNC: 32 U/L (ref 35–104)
ALT SERPL-CCNC: 7 U/L (ref 5–33)
ANION GAP SERPL CALCULATED.3IONS-SCNC: 15 MMOL/L (ref 7–19)
APTT: 31.7 SEC (ref 26–36.2)
AST SERPL-CCNC: 14 U/L (ref 5–32)
BASOPHILS ABSOLUTE: 0 K/UL (ref 0–0.2)
BASOPHILS RELATIVE PERCENT: 0.5 % (ref 0–1)
BILIRUB SERPL-MCNC: 0.4 MG/DL (ref 0.2–1.2)
BUN BLDV-MCNC: 16 MG/DL (ref 8–23)
CALCIUM SERPL-MCNC: 9 MG/DL (ref 8.2–9.6)
CHLORIDE BLD-SCNC: 92 MMOL/L (ref 98–111)
CO2: 28 MMOL/L (ref 22–29)
CREAT SERPL-MCNC: 0.9 MG/DL (ref 0.5–0.9)
EKG P AXIS: 55 DEGREES
EKG P AXIS: 68 DEGREES
EKG P-R INTERVAL: 150 MS
EKG P-R INTERVAL: 154 MS
EKG Q-T INTERVAL: 358 MS
EKG Q-T INTERVAL: 408 MS
EKG QRS DURATION: 74 MS
EKG QRS DURATION: 80 MS
EKG QTC CALCULATION (BAZETT): 414 MS
EKG QTC CALCULATION (BAZETT): 423 MS
EKG T AXIS: 38 DEGREES
EKG T AXIS: 56 DEGREES
EOSINOPHILS ABSOLUTE: 0.1 K/UL (ref 0–0.6)
EOSINOPHILS RELATIVE PERCENT: 2.1 % (ref 0–5)
GFR NON-AFRICAN AMERICAN: 58
GLUCOSE BLD-MCNC: 111 MG/DL (ref 74–109)
HCT VFR BLD CALC: 35.4 % (ref 37–47)
HEMOGLOBIN: 12 G/DL (ref 12–16)
IMMATURE GRANULOCYTES #: 0 K/UL
INR BLD: 1.02 (ref 0.88–1.18)
LYMPHOCYTES ABSOLUTE: 1.7 K/UL (ref 1.1–4.5)
LYMPHOCYTES RELATIVE PERCENT: 27.2 % (ref 20–40)
MAGNESIUM: 1.6 MG/DL (ref 1.7–2.3)
MCH RBC QN AUTO: 30.9 PG (ref 27–31)
MCHC RBC AUTO-ENTMCNC: 33.9 G/DL (ref 33–37)
MCV RBC AUTO: 91.2 FL (ref 81–99)
MONOCYTES ABSOLUTE: 0.5 K/UL (ref 0–0.9)
MONOCYTES RELATIVE PERCENT: 7.4 % (ref 0–10)
NEUTROPHILS ABSOLUTE: 3.9 K/UL (ref 1.5–7.5)
NEUTROPHILS RELATIVE PERCENT: 62.6 % (ref 50–65)
PDW BLD-RTO: 12.8 % (ref 11.5–14.5)
PLATELET # BLD: 185 K/UL (ref 130–400)
PMV BLD AUTO: 10.8 FL (ref 9.4–12.3)
POTASSIUM SERPL-SCNC: 3.3 MMOL/L (ref 3.5–5)
PRO-BNP: 372 PG/ML (ref 0–1800)
PROTHROMBIN TIME: 12.8 SEC (ref 12–14.6)
RBC # BLD: 3.88 M/UL (ref 4.2–5.4)
SODIUM BLD-SCNC: 135 MMOL/L (ref 136–145)
T4 FREE: 1.57 NG/DL (ref 0.93–1.7)
TOTAL PROTEIN: 6.8 G/DL (ref 6.6–8.7)
TROPONIN: <0.01 NG/ML (ref 0–0.03)
TROPONIN: <0.01 NG/ML (ref 0–0.03)
TSH SERPL DL<=0.05 MIU/L-ACNC: 3.68 UIU/ML (ref 0.27–4.2)
WBC # BLD: 6.2 K/UL (ref 4.8–10.8)

## 2019-12-07 PROCEDURE — 99285 EMERGENCY DEPT VISIT HI MDM: CPT

## 2019-12-07 PROCEDURE — 93005 ELECTROCARDIOGRAM TRACING: CPT | Performed by: EMERGENCY MEDICINE

## 2019-12-07 PROCEDURE — 36415 COLL VENOUS BLD VENIPUNCTURE: CPT

## 2019-12-07 PROCEDURE — 6370000000 HC RX 637 (ALT 250 FOR IP): Performed by: EMERGENCY MEDICINE

## 2019-12-07 PROCEDURE — 85025 COMPLETE CBC W/AUTO DIFF WBC: CPT

## 2019-12-07 PROCEDURE — 84443 ASSAY THYROID STIM HORMONE: CPT

## 2019-12-07 PROCEDURE — 84484 ASSAY OF TROPONIN QUANT: CPT

## 2019-12-07 PROCEDURE — 85610 PROTHROMBIN TIME: CPT

## 2019-12-07 PROCEDURE — 93010 ELECTROCARDIOGRAM REPORT: CPT | Performed by: INTERNAL MEDICINE

## 2019-12-07 PROCEDURE — 96375 TX/PRO/DX INJ NEW DRUG ADDON: CPT

## 2019-12-07 PROCEDURE — 85730 THROMBOPLASTIN TIME PARTIAL: CPT

## 2019-12-07 PROCEDURE — 84439 ASSAY OF FREE THYROXINE: CPT

## 2019-12-07 PROCEDURE — 71045 X-RAY EXAM CHEST 1 VIEW: CPT

## 2019-12-07 PROCEDURE — 80053 COMPREHEN METABOLIC PANEL: CPT

## 2019-12-07 PROCEDURE — 96366 THER/PROPH/DIAG IV INF ADDON: CPT

## 2019-12-07 PROCEDURE — 96365 THER/PROPH/DIAG IV INF INIT: CPT

## 2019-12-07 PROCEDURE — 83735 ASSAY OF MAGNESIUM: CPT

## 2019-12-07 PROCEDURE — 83880 ASSAY OF NATRIURETIC PEPTIDE: CPT

## 2019-12-07 PROCEDURE — 6360000002 HC RX W HCPCS: Performed by: EMERGENCY MEDICINE

## 2019-12-07 RX ORDER — MAGNESIUM SULFATE IN WATER 40 MG/ML
2 INJECTION, SOLUTION INTRAVENOUS ONCE
Status: COMPLETED | OUTPATIENT
Start: 2019-12-07 | End: 2019-12-07

## 2019-12-07 RX ORDER — ASPIRIN 325 MG
325 TABLET ORAL ONCE
Status: COMPLETED | OUTPATIENT
Start: 2019-12-07 | End: 2019-12-07

## 2019-12-07 RX ORDER — POTASSIUM CHLORIDE 3 G/15ML
40 SOLUTION ORAL ONCE
Status: DISCONTINUED | OUTPATIENT
Start: 2019-12-07 | End: 2019-12-07 | Stop reason: CLARIF

## 2019-12-07 RX ORDER — ISOSORBIDE MONONITRATE 30 MG/1
30 TABLET, EXTENDED RELEASE ORAL DAILY
Qty: 30 TABLET | Refills: 0 | Status: SHIPPED | OUTPATIENT
Start: 2019-12-07 | End: 2020-02-23

## 2019-12-07 RX ORDER — ISOSORBIDE MONONITRATE 30 MG/1
30 TABLET, EXTENDED RELEASE ORAL ONCE
Status: COMPLETED | OUTPATIENT
Start: 2019-12-07 | End: 2019-12-07

## 2019-12-07 RX ORDER — ONDANSETRON 2 MG/ML
4 INJECTION INTRAMUSCULAR; INTRAVENOUS ONCE
Status: COMPLETED | OUTPATIENT
Start: 2019-12-07 | End: 2019-12-07

## 2019-12-07 RX ADMIN — ONDANSETRON 4 MG: 2 INJECTION INTRAMUSCULAR; INTRAVENOUS at 08:10

## 2019-12-07 RX ADMIN — MAGNESIUM SULFATE HEPTAHYDRATE 2 G: 40 INJECTION, SOLUTION INTRAVENOUS at 10:38

## 2019-12-07 RX ADMIN — POTASSIUM BICARBONATE 40 MEQ: 782 TABLET, EFFERVESCENT ORAL at 09:22

## 2019-12-07 RX ADMIN — ASPIRIN 325 MG: 325 TABLET, FILM COATED ORAL at 08:10

## 2019-12-07 RX ADMIN — ISOSORBIDE MONONITRATE 30 MG: 30 TABLET, EXTENDED RELEASE ORAL at 12:44

## 2019-12-07 ASSESSMENT — ENCOUNTER SYMPTOMS
VOMITING: 0
DIARRHEA: 0
SHORTNESS OF BREATH: 1
BACK PAIN: 0
SORE THROAT: 0
RHINORRHEA: 0
NAUSEA: 1
ABDOMINAL PAIN: 0

## 2019-12-20 ASSESSMENT — ENCOUNTER SYMPTOMS
DIARRHEA: 0
ABDOMINAL PAIN: 0
NAUSEA: 0
SHORTNESS OF BREATH: 0
CHEST TIGHTNESS: 0
VOMITING: 0

## 2020-01-24 ENCOUNTER — TELEPHONE (OUTPATIENT)
Dept: INTERNAL MEDICINE | Facility: CLINIC | Age: 85
End: 2020-01-24

## 2020-02-23 ENCOUNTER — APPOINTMENT (OUTPATIENT)
Dept: GENERAL RADIOLOGY | Age: 85
End: 2020-02-23
Payer: MEDICARE

## 2020-02-23 ENCOUNTER — APPOINTMENT (OUTPATIENT)
Dept: CT IMAGING | Age: 85
End: 2020-02-23
Payer: MEDICARE

## 2020-02-23 ENCOUNTER — HOSPITAL ENCOUNTER (EMERGENCY)
Age: 85
Discharge: HOME | End: 2020-02-23
Attending: PEDIATRICS
Payer: MEDICARE

## 2020-02-23 VITALS
TEMPERATURE: 98.4 F | HEART RATE: 72 BPM | BODY MASS INDEX: 28.88 KG/M2 | SYSTOLIC BLOOD PRESSURE: 159 MMHG | WEIGHT: 163 LBS | RESPIRATION RATE: 17 BRPM | DIASTOLIC BLOOD PRESSURE: 56 MMHG | HEIGHT: 63 IN | OXYGEN SATURATION: 96 %

## 2020-02-23 LAB
ALBUMIN SERPL-MCNC: 3.8 G/DL (ref 3.5–5.2)
ALP BLD-CCNC: 35 U/L (ref 35–104)
ALT SERPL-CCNC: 9 U/L (ref 5–33)
ANION GAP SERPL CALCULATED.3IONS-SCNC: 10 MMOL/L (ref 7–19)
AST SERPL-CCNC: 15 U/L (ref 5–32)
BASOPHILS ABSOLUTE: 0 K/UL (ref 0–0.2)
BASOPHILS RELATIVE PERCENT: 0.2 % (ref 0–1)
BILIRUB SERPL-MCNC: <0.2 MG/DL (ref 0.2–1.2)
BILIRUBIN URINE: NEGATIVE
BLOOD, URINE: NEGATIVE
BUN BLDV-MCNC: 18 MG/DL (ref 8–23)
CALCIUM SERPL-MCNC: 8.9 MG/DL (ref 8.2–9.6)
CHLORIDE BLD-SCNC: 100 MMOL/L (ref 98–111)
CLARITY: CLEAR
CO2: 30 MMOL/L (ref 22–29)
COLOR: YELLOW
CREAT SERPL-MCNC: 1.3 MG/DL (ref 0.5–0.9)
EOSINOPHILS ABSOLUTE: 0.1 K/UL (ref 0–0.6)
EOSINOPHILS RELATIVE PERCENT: 2.5 % (ref 0–5)
GFR NON-AFRICAN AMERICAN: 38
GLUCOSE BLD-MCNC: 93 MG/DL
GLUCOSE BLD-MCNC: 93 MG/DL (ref 74–109)
GLUCOSE URINE: NEGATIVE MG/DL
HCT VFR BLD CALC: 33.8 % (ref 37–47)
HEMOGLOBIN: 10.9 G/DL (ref 12–16)
IMMATURE GRANULOCYTES #: 0 K/UL
KETONES, URINE: NEGATIVE MG/DL
LEUKOCYTE ESTERASE, URINE: NEGATIVE
LYMPHOCYTES ABSOLUTE: 1.6 K/UL (ref 1.1–4.5)
LYMPHOCYTES RELATIVE PERCENT: 28.6 % (ref 20–40)
MCH RBC QN AUTO: 30.7 PG (ref 27–31)
MCHC RBC AUTO-ENTMCNC: 32.2 G/DL (ref 33–37)
MCV RBC AUTO: 95.2 FL (ref 81–99)
MONOCYTES ABSOLUTE: 0.5 K/UL (ref 0–0.9)
MONOCYTES RELATIVE PERCENT: 8.3 % (ref 0–10)
NEUTROPHILS ABSOLUTE: 3.3 K/UL (ref 1.5–7.5)
NEUTROPHILS RELATIVE PERCENT: 60 % (ref 50–65)
NITRITE, URINE: NEGATIVE
PDW BLD-RTO: 12.3 % (ref 11.5–14.5)
PH UA: 6.5 (ref 5–8)
PLATELET # BLD: 162 K/UL (ref 130–400)
PMV BLD AUTO: 11.3 FL (ref 9.4–12.3)
POTASSIUM REFLEX MAGNESIUM: 4.2 MMOL/L (ref 3.5–5)
PROTEIN UA: NEGATIVE MG/DL
RBC # BLD: 3.55 M/UL (ref 4.2–5.4)
SODIUM BLD-SCNC: 140 MMOL/L (ref 136–145)
SPECIFIC GRAVITY UA: 1.02 (ref 1–1.03)
TOTAL PROTEIN: 6.1 G/DL (ref 6.6–8.7)
TROPONIN: <0.01 NG/ML (ref 0–0.03)
URINE REFLEX TO CULTURE: NORMAL
UROBILINOGEN, URINE: 0.2 E.U./DL
WBC # BLD: 5.6 K/UL (ref 4.8–10.8)

## 2020-02-23 PROCEDURE — 85025 COMPLETE CBC W/AUTO DIFF WBC: CPT

## 2020-02-23 PROCEDURE — 81003 URINALYSIS AUTO W/O SCOPE: CPT

## 2020-02-23 PROCEDURE — 80053 COMPREHEN METABOLIC PANEL: CPT

## 2020-02-23 PROCEDURE — 99284 EMERGENCY DEPT VISIT MOD MDM: CPT

## 2020-02-23 PROCEDURE — 71045 X-RAY EXAM CHEST 1 VIEW: CPT

## 2020-02-23 PROCEDURE — 36415 COLL VENOUS BLD VENIPUNCTURE: CPT

## 2020-02-23 PROCEDURE — 93005 ELECTROCARDIOGRAM TRACING: CPT | Performed by: PEDIATRICS

## 2020-02-23 PROCEDURE — 70450 CT HEAD/BRAIN W/O DYE: CPT

## 2020-02-23 PROCEDURE — 84484 ASSAY OF TROPONIN QUANT: CPT

## 2020-02-24 LAB
EKG P AXIS: 0 DEGREES
EKG P-R INTERVAL: 150 MS
EKG Q-T INTERVAL: 428 MS
EKG QRS DURATION: 74 MS
EKG QTC CALCULATION (BAZETT): 428 MS
EKG T AXIS: 42 DEGREES

## 2020-02-24 PROCEDURE — 93010 ELECTROCARDIOGRAM REPORT: CPT | Performed by: INTERNAL MEDICINE

## 2020-02-24 ASSESSMENT — ENCOUNTER SYMPTOMS
NAUSEA: 0
ABDOMINAL PAIN: 0
SORE THROAT: 0
VOMITING: 0
SINUS PAIN: 0
SHORTNESS OF BREATH: 0
COUGH: 0
DIARRHEA: 0

## 2020-02-24 NOTE — ED PROVIDER NOTES
[facility] EMERGENCY DEPT  eMERGENCY dEPARTMENT eNCOUnter      Pt Name: Juan Tucker  MRN: [id number]  [place] 4/25/1927  Date of evaluation: 2/23/2020  Provider: Trevor Snider MD    CHIEF COMPLAINT       Chief Complaint   Patient presents with    Hypertension     c/o BP systolic 521 at home         HISTORY OF PRESENT ILLNESS   (Location/Symptom, Timing/Onset,Context/Setting, Quality, Duration, Modifying Factors, Severity)  Note limiting factors. Juan Tucker is a 80 y.o. female who presents to the emergency department with hypertension. States that she became very nervous today when her blood pressure reading was 160/80. Has been taking her medications as prescribed. She has had no changes in her medications or new medications added. Patient states that she began experiencing dizziness. She denies sensation of movement such as vertigo. Patient denies chest pain, shortness of breath, nausea, vomiting, headache, weakness, difficulty walking or speaking. Patient states that she was seen by her cardiologist, Dr. Franny Franklin, last fall (2019). She is states she underwent a full cardiac work-up which was negative. HPI    NursingNotes were reviewed. REVIEW OF SYSTEMS    (2-9 systems for level 4, 10 or more for level 5)     Review of Systems   Constitutional: Negative for chills, diaphoresis and fever. HENT: Negative for congestion, sinus pain and sore throat. Respiratory: Negative for cough and shortness of breath. Cardiovascular: Positive for leg swelling (Chronic per patient). Negative for chest pain. Gastrointestinal: Negative for abdominal pain, diarrhea, nausea and vomiting. Genitourinary: Negative for decreased urine volume and difficulty urinating. Musculoskeletal: Negative for neck stiffness. Skin: Negative for pallor and rash. Neurological: Positive for dizziness. Negative for syncope, weakness and light-headedness. Psychiatric/Behavioral: Negative for agitation and confusion. All other systems reviewed and are negative. PAST MEDICALHISTORY     Past Medical History:   Diagnosis Date    Heart palpitations     Hypertension          SURGICAL HISTORY       Past Surgical History:   Procedure Laterality Date    CAROTID ENDARTERECTOMY      CHOLECYSTECTOMY      HYSTERECTOMY      KIDNEY STONE SURGERY           CURRENT MEDICATIONS     Discharge Medication List as of 2020  9:50 PM      CONTINUE these medications which have NOT CHANGED    Details   citalopram (CELEXA) 10 MG tablet Take 10 mg by mouth dailyHistorical Med      cloNIDine (CATAPRES) 0.1 MG tablet Take 0.1 mg by mouth 2 times dailyHistorical Med      diltiazem (DILACOR XR) 240 MG extended release capsule Take 240 mg by mouth dailyHistorical Med      ondansetron (ZOFRAN ODT) 4 MG disintegrating tablet Take 1 tablet by mouth every 8 hours as needed for Nausea or Vomiting, Disp-15 tablet, R-0Print      Multiple Vitamins-Minerals (THERAPEUTIC MULTIVITAMIN-MINERALS) tablet Take 1 tablet by mouth dailyHistorical Med      pantoprazole sodium (PROTONIX) 40 MG PACK packet Take 40 mg by mouth every morning (before breakfast)Historical Med             ALLERGIES     Codeine    FAMILY HISTORY       Family History   Problem Relation Age of Onset    Stroke Mother         CAD, palpitations,  12          SOCIAL HISTORY       Social History     Socioeconomic History    Marital status:       Spouse name: None    Number of children: None    Years of education: None    Highest education level: None   Occupational History    None   Social Needs    Financial resource strain: None    Food insecurity:     Worry: None     Inability: None    Transportation needs:     Medical: None     Non-medical: None   Tobacco Use    Smoking status: Never Smoker    Smokeless tobacco: Never Used   Substance and Sexual Activity    Alcohol use: No     Alcohol/week: 0.0 standard drinks    Drug use: No    Sexual activity: None Abdominal:      General: Bowel sounds are normal.      Palpations: Abdomen is soft. Tenderness: There is no abdominal tenderness. There is no guarding. Musculoskeletal:         General: No deformity or signs of injury. Right lower leg: Edema (Trace bilateral lower edema) present. Left lower leg: Edema present. Skin:     General: Skin is warm and dry. Capillary Refill: Capillary refill takes less than 2 seconds. Coloration: Skin is not jaundiced. Neurological:      General: No focal deficit present. Mental Status: She is alert and oriented to person, place, and time. Mental status is at baseline. Motor: Weakness (Mild generalized weakness consistent with age) present. Coordination: Coordination normal.   Psychiatric:         Mood and Affect: Mood normal.         Behavior: Behavior normal.         Thought Content: Thought content normal.         Judgment: Judgment normal.         DIAGNOSTIC RESULTS     EKG: All EKG's areinterpreted by the Emergency Department Physician who either signs or Co-signs this chart in the absence of a cardiologist.    EKG dated 2/23/2020 at 7:24 PM: Normal sinus rhythm. Rate 60. Normal EKG. RADIOLOGY:  Non-plain film images such as CT, Ultrasound and MRI are read by the radiologist. Plain radiographic images are visualized and preliminarily interpreted bythe emergency physician with the below findings:      CT Head WO Contrast   Final Result   Impression: No acute intracranial abnormality or significant interval   change. There are chronic findings associated with aging. Signed by Dr Sarah Barron on 2/23/2020 7:52 PM      XR CHEST PORTABLE   Final Result   Small 9 mm stellate nodule in the right upper lobe, not   clearly seen on the previous chest x-ray. Neoplasm is not excluded,   focal scarring could have a similar appearance. Signed by Dr Sarah Barron on 2/23/2020 7:50 PM              LABS:  Labs Reviewed   CBC WITH AUTO DIFFERENTIAL - Abnormal; Notable for the following components:       Result Value    RBC 3.55 (*)     Hemoglobin 10.9 (*)     Hematocrit 33.8 (*)     MCHC 32.2 (*)     All other components within normal limits   COMPREHENSIVE METABOLIC PANEL W/ REFLEX TO MG FOR LOW K - Abnormal; Notable for the following components:    CO2 30 (*)     CREATININE 1.3 (*)     GFR Non- 38 (*)     Total Protein 6.1 (*)     All other components within normal limits   POCT GLUCOSE - Normal   TROPONIN   URINE RT REFLEX TO CULTURE       All other labs were within normal range or not returned as of this dictation. EMERGENCY DEPARTMENT COURSE and DIFFERENTIAL DIAGNOSIS/MDM:   Vitals:    Vitals:    02/23/20 1832 02/23/20 1902 02/23/20 1911 02/23/20 2149   BP: (!) 138/49 135/68 (!) 142/45 (!) 159/56   Pulse: 69 66 69 72   Resp: 15 22 14 17   Temp:    98.4 °F (36.9 °C)   TempSrc:    Temporal   SpO2: 94% 97% 95% 96%   Weight:       Height:           MDM  70-year-old female presents with history of hypertension and dizziness. Patient is asymptomatic at this time. Labs, EKG, and radiology results reviewed. Patient's blood pressures in the emergency department were trolled to mildly elevated at most.  Patient will be discharged to home to follow-up with Dr. Doyle Gillis, her primary care provider. She will return to the emergency department with headache, difficulty walking or speaking, weakness, passing out, or other concerns. Reassessment  On reassessment at bedside patient remains asymptomatic and in no acute distress. His physical examination is unchanged. Procedures    FINAL IMPRESSION      1.  Hypertension, unspecified type          DISPOSITION/PLAN   DISPOSITION Decision To Discharge 02/23/2020 09:49:45 PM      PATIENT REFERRED TO:  William Gandara MD  [address] [place] [address]   [place] [id number] [phone]    Schedule an appointment as soon as possible for a visit         DISCHARGE MEDICATIONS:  Discharge

## 2020-02-25 ENCOUNTER — OFFICE VISIT (OUTPATIENT)
Dept: INTERNAL MEDICINE | Facility: CLINIC | Age: 85
End: 2020-02-25

## 2020-02-25 VITALS
HEIGHT: 63 IN | DIASTOLIC BLOOD PRESSURE: 82 MMHG | WEIGHT: 99 LBS | OXYGEN SATURATION: 99 % | HEART RATE: 76 BPM | SYSTOLIC BLOOD PRESSURE: 155 MMHG | BODY MASS INDEX: 17.54 KG/M2

## 2020-02-25 DIAGNOSIS — R63.4 WEIGHT LOSS: ICD-10-CM

## 2020-02-25 DIAGNOSIS — R41.3 MEMORY DISORDER: ICD-10-CM

## 2020-02-25 DIAGNOSIS — F41.9 ANXIETY: ICD-10-CM

## 2020-02-25 DIAGNOSIS — I10 ESSENTIAL HYPERTENSION: Primary | ICD-10-CM

## 2020-02-25 DIAGNOSIS — D53.1 VITAMIN B12 DEFICIENT MEGALOBLASTIC ANEMIA: ICD-10-CM

## 2020-02-25 PROBLEM — I47.10 SUPRAVENTRICULAR TACHYCARDIA: Status: ACTIVE | Noted: 2020-02-25

## 2020-02-25 PROBLEM — K21.9 GASTROESOPHAGEAL REFLUX DISEASE WITHOUT ESOPHAGITIS: Status: ACTIVE | Noted: 2019-05-26

## 2020-02-25 PROBLEM — IMO0002 POSTURAL VERTIGO: Status: ACTIVE | Noted: 2020-02-25

## 2020-02-25 PROBLEM — I47.1 SUPRAVENTRICULAR TACHYCARDIA: Status: ACTIVE | Noted: 2020-02-25

## 2020-02-25 PROBLEM — N18.30 CKD (CHRONIC KIDNEY DISEASE) STAGE 3, GFR 30-59 ML/MIN (HCC): Status: ACTIVE | Noted: 2019-05-31

## 2020-02-25 PROBLEM — K57.92 DIVERTICULITIS: Status: ACTIVE | Noted: 2019-01-10

## 2020-02-25 PROBLEM — E78.2 MIXED HYPERLIPIDEMIA: Status: ACTIVE | Noted: 2020-02-25

## 2020-02-25 PROBLEM — K44.9 HIATAL HERNIA: Status: ACTIVE | Noted: 2019-04-24

## 2020-02-25 PROBLEM — E43 SEVERE MALNUTRITION (HCC): Status: ACTIVE | Noted: 2019-05-31

## 2020-02-25 PROCEDURE — 99214 OFFICE O/P EST MOD 30 MIN: CPT | Performed by: INTERNAL MEDICINE

## 2020-02-25 PROCEDURE — 96372 THER/PROPH/DIAG INJ SC/IM: CPT | Performed by: INTERNAL MEDICINE

## 2020-02-25 RX ORDER — LORAZEPAM 0.5 MG/1
0.5 TABLET ORAL EVERY 8 HOURS PRN
Qty: 30 TABLET | Refills: 1 | Status: SHIPPED | OUTPATIENT
Start: 2020-02-25 | End: 2020-04-07

## 2020-02-25 RX ORDER — ESTRADIOL 1 MG/1
1 TABLET ORAL DAILY
COMMUNITY
Start: 2020-02-17 | End: 2020-12-02

## 2020-02-25 RX ORDER — ERGOCALCIFEROL 1.25 MG/1
1 CAPSULE ORAL WEEKLY
COMMUNITY
Start: 2020-01-31 | End: 2020-12-02 | Stop reason: SDUPTHER

## 2020-02-25 RX ORDER — ISOSORBIDE MONONITRATE 30 MG/1
1 TABLET, EXTENDED RELEASE ORAL DAILY
COMMUNITY
Start: 2019-12-09 | End: 2020-12-02

## 2020-02-25 RX ORDER — POTASSIUM CHLORIDE 750 MG/1
1 TABLET, EXTENDED RELEASE ORAL DAILY
COMMUNITY
Start: 2019-12-17 | End: 2020-02-25 | Stop reason: SDUPTHER

## 2020-02-25 RX ORDER — CYANOCOBALAMIN 1000 UG/ML
1000 INJECTION, SOLUTION INTRAMUSCULAR; SUBCUTANEOUS
Status: SHIPPED | OUTPATIENT
Start: 2020-02-25

## 2020-02-25 RX ADMIN — CYANOCOBALAMIN 1000 MCG: 1000 INJECTION, SOLUTION INTRAMUSCULAR; SUBCUTANEOUS at 10:46

## 2020-02-25 NOTE — PROGRESS NOTES
Subjective   Sabiha Sherwood is a 89 y.o. female.   Chief Complaint   Patient presents with   • Hypertension     running high/discuss medication       Patient is here with her daughter after recent visit to the emergency room.  Daughter states that patient became very anxious had a moderately elevated blood pressure in the 150 range panicked and her daughter subsequently took her to the emergency room.  This is been an ongoing discussion for the last few years Ms. Sherwood is no longer able to care for herself in her home environment.  She is panicking when she is there alone subsequently ending up in the emergency room for what often times is only minimally elevated blood pressure.  The daughter is questioning whether she should be giving her Ativan when she has these spells.       The following portions of the patient's history were reviewed and updated as appropriate: allergies, current medications, past family history, past medical history, past social history, past surgical history and problem list.    Review of Systems   Unable to perform ROS: Dementia       Objective   Past Medical History:   Diagnosis Date   • Acute renal failure syndrome (CMS/HCC)    • Arrhythmia    • Diverticulitis    • Hiatal hernia    • Hypertension    • UTI (urinary tract infection)    • Vertigo       Past Surgical History:   Procedure Laterality Date   • CHOLECYSTECTOMY     • COLONOSCOPY N/A 5/10/2019    Procedure: COLONOSCOPY WITH ANESTHESIA;  Surgeon: Steven Bassett MD;  Location: [facility] ENDOSCOPY;  Service: Gastroenterology   • ENDOSCOPY N/A 5/10/2019    Procedure: ESOPHAGOGASTRODUODENOSCOPY WITH ANESTHESIA;  Surgeon: Steven Bassett MD;  Location: [facility] ENDOSCOPY;  Service: Gastroenterology   • HYSTERECTOMY          Current Outpatient Medications:   •  Calcium Carbonate-Vitamin D (CALTRATE 600+D PO), Take 1 tablet by mouth Daily., Disp: , Rfl:   •  citalopram (CeleXA) 10 MG tablet, Take 10 mg by mouth Daily., Disp: , Rfl:    •  CloNIDine (CATAPRES) 0.1 MG tablet, Take 0.1 mg by mouth 2 (Two) Times a Day., Disp: , Rfl:   •  diltiaZEM CD (CARDIZEM CD) 240 MG 24 hr capsule, Take 240 mg by mouth Daily., Disp: , Rfl:   •  estradiol (ESTRACE) 1 MG tablet, Take 1 tablet by mouth Daily., Disp: , Rfl:   •  Multiple Vitamins-Minerals (MULTIPLE VITAMINS/WOMENS PO), Take 1 tablet by mouth Daily., Disp: , Rfl:   •  pantoprazole (PROTONIX) 40 MG EC tablet, Take 40 mg by mouth Daily., Disp: , Rfl:   •  potassium chloride (K-DUR) 10 MEQ CR tablet, Take 10 mEq by mouth Daily., Disp: , Rfl:   •  vitamin D (ERGOCALCIFEROL) 1.25 MG (82383 UT) capsule capsule, Take 1 capsule by mouth 1 (One) Time Per Week., Disp: , Rfl:   •  aspirin 81 MG EC tablet, Take 81 mg by mouth Daily., Disp: , Rfl:   •  chlorthalidone (HYGROTON) 25 MG tablet, Take 25 mg by mouth 2 (Two) Times a Day., Disp: , Rfl:   •  isosorbide mononitrate (IMDUR) 30 MG 24 hr tablet, Take 1 tablet by mouth Daily., Disp: , Rfl:   •  LORazepam (ATIVAN) 0.5 MG tablet, Take 0.5 mg by mouth Every 8 (Eight) Hours As Needed for Anxiety., Disp: , Rfl:   •  spironolactone (ALDACTONE) 25 MG tablet, Take 25 mg by mouth 2 (Two) Times a Day., Disp: , Rfl:     Current Facility-Administered Medications:   •  cyanocobalamin injection 1,000 mcg, 1,000 mcg, Intramuscular, Q28 Days, Loc Sinclair MD, 1,000 mcg at 02/25/20 1046     Vitals:    02/25/20 1008   BP: 155/82   Pulse: 76   SpO2: 99%         02/25/20  1008   Weight: 44.9 kg (99 lb)     Patient's Body mass index is 17.54 kg/m². BMI is above normal parameters. Recommendations include: exercise counseling and nutrition counseling.      Physical Exam   Constitutional: She is oriented to person, place, and time.   Patient appears cachectic some weight loss   HENT:   Head: Normocephalic and atraumatic.   Right Ear: External ear normal.   Left Ear: External ear normal.   Nose: Nose normal.   Mouth/Throat: Oropharynx is clear and moist.   Eyes: Pupils are  equal, round, and reactive to light. Conjunctivae and EOM are normal.   Eyes are sunken   Neck: Normal range of motion. Neck supple. No thyromegaly present.   Cardiovascular: Normal rate, regular rhythm, normal heart sounds and intact distal pulses.   Pulmonary/Chest: Effort normal and breath sounds normal.   Abdominal: Soft. Bowel sounds are normal.   Lymphadenopathy:     She has no cervical adenopathy.   Neurological: She is alert and oriented to person, place, and time.   Skin: Skin is warm and dry.   Psychiatric: She has a normal mood and affect. Her behavior is normal. Thought content normal.   Nursing note and vitals reviewed.            Assessment/Plan   Diagnoses and all orders for this visit:    1. Essential hypertension (Primary)    2. Vitamin B12 deficient megaloblastic anemia  -     cyanocobalamin injection 1,000 mcg    3. Weight loss    4. Memory disorder    Other orders  -     LORazepam (ATIVAN) 0.5 MG tablet; Take 1 tablet by mouth Every 8 (Eight) Hours As Needed for Anxiety.  Dispense: 30 tablet; Refill: 1      I had a long discussion with Ms. Sherwood and her daughter about the need for assisted living.  I have explained that if she continues to live alone she will likely continue to lose weight continue to become confused have panic attacks with repeat visits to the emergency room.  In regard to the Ativan I think it is okay for her daughter to give her Ativan when she has these episodes otherwise she needs to be monitored as far as her medications.  I firmly believe the patient will continue to deteriorate unless she is placed in a facility where she can have 24hour monitoring I think assisted living would be appropriate at this point I have pleaded with the daughter and Ms. Sherwood to enter assisted living.  The daughter tells me they visited assisted-living facilities on 3 different occasions but Ms. Sherwood is so far resistant to that

## 2020-03-06 ENCOUNTER — TELEPHONE (OUTPATIENT)
Dept: INTERNAL MEDICINE | Facility: CLINIC | Age: 85
End: 2020-03-06

## 2020-03-06 NOTE — TELEPHONE ENCOUNTER
Please call pt @ [phone] and advise of how B12 shot and other Rx prescribed on 02/25/20 should be taken.

## 2020-03-09 ENCOUNTER — CLINICAL SUPPORT (OUTPATIENT)
Dept: INTERNAL MEDICINE | Facility: CLINIC | Age: 85
End: 2020-03-09

## 2020-03-09 DIAGNOSIS — D51.0 PERNICIOUS ANEMIA: ICD-10-CM

## 2020-03-09 PROCEDURE — 96372 THER/PROPH/DIAG INJ SC/IM: CPT | Performed by: INTERNAL MEDICINE

## 2020-03-09 RX ADMIN — CYANOCOBALAMIN 1000 MCG: 1000 INJECTION, SOLUTION INTRAMUSCULAR; SUBCUTANEOUS at 13:25

## 2020-03-09 NOTE — TELEPHONE ENCOUNTER
Called dgt and she knew pt had called and and she has already explained to her and answered the questions and clarifications she had and didn't feel we needed to call her back.

## 2020-04-07 DIAGNOSIS — F41.9 ANXIETY: ICD-10-CM

## 2020-04-07 RX ORDER — LORAZEPAM 0.5 MG/1
TABLET ORAL
Qty: 30 TABLET | Refills: 5 | Status: SHIPPED | OUTPATIENT
Start: 2020-04-07 | End: 2020-10-23 | Stop reason: SDUPTHER

## 2020-04-22 ENCOUNTER — OFFICE VISIT (OUTPATIENT)
Dept: INTERNAL MEDICINE | Facility: CLINIC | Age: 85
End: 2020-04-22

## 2020-04-22 VITALS
HEIGHT: 63 IN | DIASTOLIC BLOOD PRESSURE: 40 MMHG | BODY MASS INDEX: 18.61 KG/M2 | WEIGHT: 105 LBS | SYSTOLIC BLOOD PRESSURE: 156 MMHG

## 2020-04-22 DIAGNOSIS — I10 ESSENTIAL HYPERTENSION: Primary | ICD-10-CM

## 2020-04-22 DIAGNOSIS — R63.4 WEIGHT LOSS: ICD-10-CM

## 2020-04-22 PROCEDURE — 99442 PR PHYS/QHP TELEPHONE EVALUATION 11-20 MIN: CPT | Performed by: INTERNAL MEDICINE

## 2020-04-22 RX ORDER — FLUTICASONE PROPIONATE 50 MCG
1 SPRAY, SUSPENSION (ML) NASAL
COMMUNITY
Start: 2016-02-01 | End: 2020-12-02

## 2020-04-22 RX ORDER — NITROGLYCERIN 0.4 MG/1
TABLET SUBLINGUAL
COMMUNITY
Start: 2017-10-18

## 2020-04-22 NOTE — PROGRESS NOTES
Subjective   Sabiha Sherwood is a 89 y.o. female.   Chief Complaint   Patient presents with   • Follow-up     hypertension       This is a follow-up visit on patient who has memory disorder hypertension and weight loss.  It is almost impossible to tell from talking with patient how her memory is doing or what she is doing with her weight loss she tells me she is eating better.  She tells me her daughter or someone stays with her every evening and that she is continue to live at home.    You have chosen to receive care through a telephone visit. Do you consent to use a telephone visit for your medical care today? Yes       The following portions of the patient's history were reviewed and updated as appropriate: allergies, current medications, past family history, past medical history, past social history, past surgical history and problem list.    Review of Systems   Constitutional: Negative for activity change, appetite change, fatigue, fever, unexpected weight gain and unexpected weight loss.   HENT: Negative for swollen glands, trouble swallowing and voice change.    Eyes: Negative for blurred vision and visual disturbance.   Respiratory: Negative for cough and shortness of breath.    Cardiovascular: Negative for chest pain, palpitations and leg swelling.   Gastrointestinal: Negative for abdominal pain, constipation, diarrhea, nausea, vomiting and indigestion.   Endocrine: Negative for cold intolerance, heat intolerance, polydipsia and polyphagia.   Genitourinary: Negative for dysuria and frequency.   Musculoskeletal: Negative for arthralgias, back pain, joint swelling and neck pain.   Skin: Negative for color change, rash and skin lesions.   Neurological: Negative for dizziness, weakness, headache, memory problem and confusion.   Hematological: Does not bruise/bleed easily.   Psychiatric/Behavioral: Negative for agitation, hallucinations and suicidal ideas. The patient is not nervous/anxious.        Objective    Past Medical History:   Diagnosis Date   • Acute renal failure syndrome (CMS/HCC)    • Arrhythmia    • Diverticulitis    • Hiatal hernia    • Hypertension    • UTI (urinary tract infection)    • Vertigo       Past Surgical History:   Procedure Laterality Date   • CHOLECYSTECTOMY     • COLONOSCOPY N/A 5/10/2019    Procedure: COLONOSCOPY WITH ANESTHESIA;  Surgeon: Steven Bassett MD;  Location: [facility] ENDOSCOPY;  Service: Gastroenterology   • ENDOSCOPY N/A 5/10/2019    Procedure: ESOPHAGOGASTRODUODENOSCOPY WITH ANESTHESIA;  Surgeon: Steven Bassett MD;  Location: [facility] ENDOSCOPY;  Service: Gastroenterology   • HYSTERECTOMY          Current Outpatient Medications:   •  aspirin 81 MG EC tablet, Take 81 mg by mouth Daily., Disp: , Rfl:   •  Calcium Carbonate-Vitamin D (CALTRATE 600+D PO), Take 1 tablet by mouth Daily., Disp: , Rfl:   •  chlorthalidone (HYGROTON) 25 MG tablet, Take 25 mg by mouth 2 (Two) Times a Day., Disp: , Rfl:   •  citalopram (CeleXA) 10 MG tablet, Take 10 mg by mouth Daily., Disp: , Rfl:   •  CloNIDine (CATAPRES) 0.1 MG tablet, Take 0.1 mg by mouth 2 (Two) Times a Day., Disp: , Rfl:   •  diltiaZEM CD (CARDIZEM CD) 240 MG 24 hr capsule, Take 240 mg by mouth Daily., Disp: , Rfl:   •  estradiol (ESTRACE) 1 MG tablet, Take 1 tablet by mouth Daily., Disp: , Rfl:   •  fluticasone (FLONASE) 50 MCG/ACT nasal spray, 1 spray into the nostril(s) as directed by provider., Disp: , Rfl:   •  Multiple Vitamins-Minerals (MULTIPLE VITAMINS/WOMENS PO), Take 1 tablet by mouth Daily., Disp: , Rfl:   •  nitroglycerin (Nitrostat) 0.4 MG SL tablet, Place  under the tongue., Disp: , Rfl:   •  pantoprazole (PROTONIX) 40 MG EC tablet, Take 40 mg by mouth Daily., Disp: , Rfl:   •  potassium chloride (K-DUR) 10 MEQ CR tablet, Take 10 mEq by mouth Daily., Disp: , Rfl:   •  isosorbide mononitrate (IMDUR) 30 MG 24 hr tablet, Take 1 tablet by mouth Daily., Disp: , Rfl:   •  LORazepam (ATIVAN) 0.5 MG tablet, TAKE 1 TABLET  EVERY 8 HOURS AS NEEDED FOR ANXIETY., Disp: 30 tablet, Rfl: 5  •  spironolactone (ALDACTONE) 25 MG tablet, Take 25 mg by mouth 2 (Two) Times a Day., Disp: , Rfl:   •  vitamin D (ERGOCALCIFEROL) 1.25 MG (61215 UT) capsule capsule, Take 1 capsule by mouth 1 (One) Time Per Week., Disp: , Rfl:     Current Facility-Administered Medications:   •  cyanocobalamin injection 1,000 mcg, 1,000 mcg, Intramuscular, Q28 Days, Loc Sinclair MD, 1,000 mcg at 03/09/20 1325     Vitals:    04/22/20 1507   BP: 156/40         04/22/20  1507   Weight: 47.6 kg (105 lb)     Patient's Body mass index is 18.6 kg/m². BMI is below normal parameters. Recommendations include: treating the underlying disease process.      Physical Exam   Neurological: She is alert.   Psychiatric: She has a normal mood and affect.       This visit has been rescheduled as a phone visit to comply with patient safety concerns in accordance with CDC recommendations. Total time of discussion was 12 minutes.        Assessment/Plan   Diagnoses and all orders for this visit:    1. Essential hypertension (Primary)    2. Weight loss      This is a follow-up visit for Ms. Sherwood who has dementia hypertension and weight loss.  She assures me that someone is staying with her in the evening that she is eating better.  We will really have to get her back in here in a couple months and see how she is actually doing.

## 2020-06-08 ENCOUNTER — TELEPHONE (OUTPATIENT)
Dept: INTERNAL MEDICINE | Facility: CLINIC | Age: 85
End: 2020-06-08

## 2020-06-08 NOTE — TELEPHONE ENCOUNTER
Tell Alix I really do not think she needs to be living alone we talked about this on previous occasions I think assisted living Dr. Boyer would be a excellent thing for her.  I will talk with patient about this tomorrow

## 2020-06-08 NOTE — TELEPHONE ENCOUNTER
PT's daughter, Alix, called; states she just wanted to make Dr. Sinclair aware of a few things before her office visit, scheduled for tomorrow, 6/9/2020. Reports PT is very forgetful, doing things such as forgetting that her mother has passed away and calling family members at odd hours. Other days PT will make perfect sense and has no difficulty, but will say something off the wall regarding her mother who has been gone for 20+ years. Alix was able to look after PT, but is now concerned that she's going to become a risk to herself. Alix is also concerned that PT may have suffered a small CVA considering just how much her confusion has increased.    Please advise/call Alix back: [phone].

## 2020-06-09 ENCOUNTER — OFFICE VISIT (OUTPATIENT)
Dept: INTERNAL MEDICINE | Facility: CLINIC | Age: 85
End: 2020-06-09

## 2020-06-09 VITALS
DIASTOLIC BLOOD PRESSURE: 80 MMHG | OXYGEN SATURATION: 100 % | HEART RATE: 76 BPM | SYSTOLIC BLOOD PRESSURE: 130 MMHG | BODY MASS INDEX: 17.89 KG/M2 | WEIGHT: 101 LBS | HEIGHT: 63 IN

## 2020-06-09 DIAGNOSIS — D53.1 VITAMIN B12 DEFICIENT MEGALOBLASTIC ANEMIA: ICD-10-CM

## 2020-06-09 DIAGNOSIS — F01.50 VASCULAR DEMENTIA WITHOUT BEHAVIORAL DISTURBANCE (HCC): Primary | ICD-10-CM

## 2020-06-09 DIAGNOSIS — I10 ESSENTIAL HYPERTENSION: ICD-10-CM

## 2020-06-09 PROBLEM — I65.23 ARTERIOSCLEROSIS OF BOTH CAROTID ARTERIES: Status: ACTIVE | Noted: 2020-06-09

## 2020-06-09 PROBLEM — R91.1 PULMONARY NODULE, RIGHT: Status: ACTIVE | Noted: 2020-06-09

## 2020-06-09 PROBLEM — I25.10 CAD (CORONARY ARTERY DISEASE): Status: ACTIVE | Noted: 2020-06-09

## 2020-06-09 PROBLEM — E55.9 VITAMIN D DEFICIENCY: Status: ACTIVE | Noted: 2020-06-09

## 2020-06-09 PROBLEM — E04.9 GOITER: Status: ACTIVE | Noted: 2020-06-09

## 2020-06-09 PROBLEM — I65.22 CAROTID OCCLUSION, LEFT: Status: ACTIVE | Noted: 2020-06-09

## 2020-06-09 PROBLEM — I49.1 PAC (PREMATURE ATRIAL CONTRACTION): Status: ACTIVE | Noted: 2020-06-09

## 2020-06-09 PROBLEM — M10.9 GOUT OF BIG TOE: Status: ACTIVE | Noted: 2020-06-09

## 2020-06-09 PROBLEM — J45.901 INTRINSIC ASTHMA WITH EXACERBATION: Status: ACTIVE | Noted: 2020-06-09

## 2020-06-09 PROCEDURE — 99214 OFFICE O/P EST MOD 30 MIN: CPT | Performed by: INTERNAL MEDICINE

## 2020-06-09 RX ORDER — POTASSIUM CHLORIDE 750 MG/1
1 TABLET, EXTENDED RELEASE ORAL DAILY
COMMUNITY
Start: 2020-05-23 | End: 2020-12-02

## 2020-06-09 RX ORDER — MEGESTROL ACETATE 125 MG/ML
5 SUSPENSION ORAL EVERY 24 HOURS
COMMUNITY
End: 2020-12-02

## 2020-06-09 NOTE — PROGRESS NOTES
Subjective   Sabiha Sherwood is a 89 y.o. female.   Chief Complaint   Patient presents with   • Hypertension     6 month follow up   • Altered Mental Status     per patient daugther memory loss/confusion       Ms. Sherwood is here again for follow-up.  Her daughter is present with her.  We are discussing assisted living as we have for the last year or 2.  I have continued to encourage both patient and daughter to have her live in assisted living she is not getting her meals regularly her daughter is taking care of her  with vascular dementia.   is a sweet lady as I have ever taking care of unfortunately her memory is failing and is difficult for her to make this transition she has agreed to enter assisted living in the next 1 to 2 weeks I have asked her to come back in about 6 weeks so that I can follow-up with her once she has made that transition.       The following portions of the patient's history were reviewed and updated as appropriate: allergies, current medications, past family history, past medical history, past social history, past surgical history and problem list.    Review of Systems   Unable to perform ROS: Dementia       Objective   Past Medical History:   Diagnosis Date   • Acute renal failure syndrome (CMS/HCC)    • Arrhythmia    • Diverticulitis    • Hiatal hernia    • Hypertension    • UTI (urinary tract infection)    • Vertigo       Past Surgical History:   Procedure Laterality Date   • CHOLECYSTECTOMY     • COLONOSCOPY N/A 5/10/2019    Procedure: COLONOSCOPY WITH ANESTHESIA;  Surgeon: Steven Bassett MD;  Location: [facility] ENDOSCOPY;  Service: Gastroenterology   • ENDOSCOPY N/A 5/10/2019    Procedure: ESOPHAGOGASTRODUODENOSCOPY WITH ANESTHESIA;  Surgeon: Steven Bassett MD;  Location: [facility] ENDOSCOPY;  Service: Gastroenterology   • HYSTERECTOMY          Current Outpatient Medications:   •  aspirin 81 MG EC tablet, Take 81 mg by mouth Daily., Disp: , Rfl:   •  Calcium  Carbonate-Vitamin D (CALTRATE 600+D PO), Take 1 tablet by mouth Daily., Disp: , Rfl:   •  chlorthalidone (HYGROTON) 25 MG tablet, Take 25 mg by mouth 2 (Two) Times a Day., Disp: , Rfl:   •  citalopram (CeleXA) 10 MG tablet, Take 10 mg by mouth Daily., Disp: , Rfl:   •  CloNIDine (CATAPRES) 0.1 MG tablet, Take 0.1 mg by mouth 2 (Two) Times a Day., Disp: , Rfl:   •  diltiaZEM CD (CARDIZEM CD) 240 MG 24 hr capsule, Take 240 mg by mouth Daily., Disp: , Rfl:   •  estradiol (ESTRACE) 1 MG tablet, Take 1 tablet by mouth Daily., Disp: , Rfl:   •  fluticasone (FLONASE) 50 MCG/ACT nasal spray, 1 spray into the nostril(s) as directed by provider., Disp: , Rfl:   •  isosorbide mononitrate (IMDUR) 30 MG 24 hr tablet, Take 1 tablet by mouth Daily., Disp: , Rfl:   •  LORazepam (ATIVAN) 0.5 MG tablet, TAKE 1 TABLET EVERY 8 HOURS AS NEEDED FOR ANXIETY., Disp: 30 tablet, Rfl: 5  •  megestrol (MEGACE ES) 625 MG/5ML suspension, Take 5 mL by mouth Daily., Disp: , Rfl:   •  Multiple Vitamins-Minerals (MULTIPLE VITAMINS/WOMENS PO), Take 1 tablet by mouth Daily., Disp: , Rfl:   •  nitroglycerin (Nitrostat) 0.4 MG SL tablet, Place  under the tongue., Disp: , Rfl:   •  pantoprazole (PROTONIX) 40 MG EC tablet, Take 40 mg by mouth Daily., Disp: , Rfl:   •  potassium chloride (K-DUR) 10 MEQ CR tablet, Take 10 mEq by mouth Daily., Disp: , Rfl:   •  potassium chloride (K-DUR,KLOR-CON) 10 MEQ CR tablet, Take 1 tablet by mouth Daily., Disp: , Rfl:   •  spironolactone (ALDACTONE) 25 MG tablet, Take 25 mg by mouth 2 (Two) Times a Day., Disp: , Rfl:   •  vitamin D (ERGOCALCIFEROL) 1.25 MG (20469 UT) capsule capsule, Take 1 capsule by mouth 1 (One) Time Per Week., Disp: , Rfl:     Current Facility-Administered Medications:   •  cyanocobalamin injection 1,000 mcg, 1,000 mcg, Intramuscular, Q28 Days, Loc Sinclair MD, 1,000 mcg at 03/09/20 1325     Vitals:    06/09/20 1439   BP: 130/80   Pulse: 76   SpO2: 100%         06/09/20  1439   Weight: 45.8 kg  (101 lb)     Patient's Body mass index is 17.89 kg/m². BMI is below normal parameters. Recommendations include: treating the underlying disease process.      Physical Exam   Constitutional: She appears well-developed and well-nourished.   HENT:   Head: Normocephalic and atraumatic.   Right Ear: External ear normal.   Left Ear: External ear normal.   Nose: Nose normal.   Mouth/Throat: Oropharynx is clear and moist.   Eyes: Pupils are equal, round, and reactive to light. Conjunctivae and EOM are normal.   Neck: Normal range of motion. Neck supple. No thyromegaly present.   Cardiovascular: Normal rate, regular rhythm, normal heart sounds and intact distal pulses.   Pulmonary/Chest: Effort normal and breath sounds normal.   Abdominal: Soft. Bowel sounds are normal.   Lymphadenopathy:     She has no cervical adenopathy.   Neurological: She is alert.   Skin: Skin is warm and dry.   Psychiatric: She has a normal mood and affect. Her behavior is normal.   Nursing note and vitals reviewed.            Assessment/Plan   Diagnoses and all orders for this visit:    1. Vascular dementia without behavioral disturbance (CMS/HCC) (Primary)    2. Vitamin B12 deficient megaloblastic anemia    3. Essential hypertension      We have a long conversation about assisted living patient is agreeable to enter assisted living in the next 1 to 2 weeks we talked about the need for good diet 3 balanced meals someone to check on her and also make sure that she is getting her medicines properly.

## 2020-06-24 RX ORDER — CITALOPRAM 10 MG/1
10 TABLET ORAL DAILY
Qty: 30 TABLET | Refills: 11 | Status: SHIPPED | OUTPATIENT
Start: 2020-06-24 | End: 2021-01-26 | Stop reason: SDUPTHER

## 2020-06-25 ENCOUNTER — TELEPHONE (OUTPATIENT)
Dept: INTERNAL MEDICINE | Facility: CLINIC | Age: 85
End: 2020-06-25

## 2020-06-25 NOTE — TELEPHONE ENCOUNTER
[facility] AT [facility] IS REQUESTING FOR ORDER TO BE CANCELLED AND SENT OVER TO [facility] FOR CELEXA

## 2020-06-25 NOTE — TELEPHONE ENCOUNTER
Prescription sent to [facility] and Vanessa at [facility] notified as well.

## 2020-07-15 DIAGNOSIS — F41.9 ANXIETY: ICD-10-CM

## 2020-07-15 NOTE — TELEPHONE ENCOUNTER
Caller: Alix Jama    Relationship: Emergency Contact    Best call back number: [phone]    Medication needed:   Requested Prescriptions     Pending Prescriptions Disp Refills   • LORazepam (ATIVAN) 0.5 MG tablet 30 tablet 5     Sig: Take 1 tablet by mouth Every 8 (Eight) Hours As Needed for Anxiety.       When do you need the refill by: 07/20/2020    What details did the patient provide when requesting the medication:      Does the patient have less than a 3 day supply:  [] Yes  [x] No    What is the patient's preferred pharmacy: [facility] [address] - [phone]  - [phone] FX

## 2020-07-21 RX ORDER — LORAZEPAM 0.5 MG/1
0.5 TABLET ORAL EVERY 8 HOURS PRN
Qty: 30 TABLET | Refills: 5 | OUTPATIENT
Start: 2020-07-21

## 2020-07-23 ENCOUNTER — APPOINTMENT (OUTPATIENT)
Dept: CT IMAGING | Age: 85
End: 2020-07-23
Payer: MEDICARE

## 2020-07-23 ENCOUNTER — HOSPITAL ENCOUNTER (EMERGENCY)
Age: 85
Discharge: HOME | End: 2020-07-23
Attending: EMERGENCY MEDICINE
Payer: MEDICARE

## 2020-07-23 VITALS
WEIGHT: 97 LBS | OXYGEN SATURATION: 98 % | RESPIRATION RATE: 26 BRPM | DIASTOLIC BLOOD PRESSURE: 48 MMHG | SYSTOLIC BLOOD PRESSURE: 122 MMHG | TEMPERATURE: 97.8 F | BODY MASS INDEX: 17.85 KG/M2 | HEIGHT: 62 IN | HEART RATE: 59 BPM

## 2020-07-23 LAB
ALBUMIN SERPL-MCNC: 3.9 G/DL (ref 3.5–5.2)
ALP BLD-CCNC: 36 U/L (ref 35–104)
ALT SERPL-CCNC: 6 U/L (ref 5–33)
ANION GAP SERPL CALCULATED.3IONS-SCNC: 9 MMOL/L (ref 7–19)
AST SERPL-CCNC: 12 U/L (ref 5–32)
BASOPHILS ABSOLUTE: 0 K/UL (ref 0–0.2)
BASOPHILS RELATIVE PERCENT: 0.4 % (ref 0–1)
BILIRUB SERPL-MCNC: 0.3 MG/DL (ref 0.2–1.2)
BILIRUBIN URINE: NEGATIVE
BLOOD, URINE: NEGATIVE
BUN BLDV-MCNC: 20 MG/DL (ref 8–23)
CALCIUM SERPL-MCNC: 8.8 MG/DL (ref 8.2–9.6)
CHLORIDE BLD-SCNC: 102 MMOL/L (ref 98–111)
CLARITY: ABNORMAL
CO2: 26 MMOL/L (ref 22–29)
COLOR: YELLOW
CREAT SERPL-MCNC: 1.1 MG/DL (ref 0.5–0.9)
EOSINOPHILS ABSOLUTE: 0.1 K/UL (ref 0–0.6)
EOSINOPHILS RELATIVE PERCENT: 2.3 % (ref 0–5)
GFR AFRICAN AMERICAN: 56
GFR NON-AFRICAN AMERICAN: 46
GLUCOSE BLD-MCNC: 102 MG/DL (ref 74–109)
GLUCOSE URINE: NEGATIVE MG/DL
HCT VFR BLD CALC: 32.5 % (ref 37–47)
HEMOGLOBIN: 10.9 G/DL (ref 12–16)
IMMATURE GRANULOCYTES #: 0 K/UL
KETONES, URINE: NEGATIVE MG/DL
LEUKOCYTE ESTERASE, URINE: NEGATIVE
LYMPHOCYTES ABSOLUTE: 1.2 K/UL (ref 1.1–4.5)
LYMPHOCYTES RELATIVE PERCENT: 21.7 % (ref 20–40)
MCH RBC QN AUTO: 31.1 PG (ref 27–31)
MCHC RBC AUTO-ENTMCNC: 33.5 G/DL (ref 33–37)
MCV RBC AUTO: 92.9 FL (ref 81–99)
MONOCYTES ABSOLUTE: 0.4 K/UL (ref 0–0.9)
MONOCYTES RELATIVE PERCENT: 6.8 % (ref 0–10)
NEUTROPHILS ABSOLUTE: 3.9 K/UL (ref 1.5–7.5)
NEUTROPHILS RELATIVE PERCENT: 68.6 % (ref 50–65)
NITRITE, URINE: NEGATIVE
PDW BLD-RTO: 12.6 % (ref 11.5–14.5)
PH UA: 6 (ref 5–8)
PLATELET # BLD: 163 K/UL (ref 130–400)
PMV BLD AUTO: 10.7 FL (ref 9.4–12.3)
POTASSIUM SERPL-SCNC: 4.6 MMOL/L (ref 3.5–5)
PROTEIN UA: NEGATIVE MG/DL
RBC # BLD: 3.5 M/UL (ref 4.2–5.4)
SODIUM BLD-SCNC: 137 MMOL/L (ref 136–145)
SPECIFIC GRAVITY UA: 1.01 (ref 1–1.03)
TOTAL PROTEIN: 6.2 G/DL (ref 6.6–8.7)
TROPONIN: <0.01 NG/ML (ref 0–0.03)
UROBILINOGEN, URINE: 1 E.U./DL
WBC # BLD: 5.7 K/UL (ref 4.8–10.8)

## 2020-07-23 PROCEDURE — 70450 CT HEAD/BRAIN W/O DYE: CPT

## 2020-07-23 PROCEDURE — 85025 COMPLETE CBC W/AUTO DIFF WBC: CPT

## 2020-07-23 PROCEDURE — 99284 EMERGENCY DEPT VISIT MOD MDM: CPT

## 2020-07-23 PROCEDURE — 2580000003 HC RX 258: Performed by: EMERGENCY MEDICINE

## 2020-07-23 PROCEDURE — 6370000000 HC RX 637 (ALT 250 FOR IP): Performed by: EMERGENCY MEDICINE

## 2020-07-23 PROCEDURE — 84484 ASSAY OF TROPONIN QUANT: CPT

## 2020-07-23 PROCEDURE — 80053 COMPREHEN METABOLIC PANEL: CPT

## 2020-07-23 PROCEDURE — 81003 URINALYSIS AUTO W/O SCOPE: CPT

## 2020-07-23 PROCEDURE — 36415 COLL VENOUS BLD VENIPUNCTURE: CPT

## 2020-07-23 PROCEDURE — 93005 ELECTROCARDIOGRAM TRACING: CPT | Performed by: EMERGENCY MEDICINE

## 2020-07-23 RX ORDER — 0.9 % SODIUM CHLORIDE 0.9 %
500 INTRAVENOUS SOLUTION INTRAVENOUS ONCE
Status: COMPLETED | OUTPATIENT
Start: 2020-07-23 | End: 2020-07-23

## 2020-07-23 RX ORDER — MECLIZINE HYDROCHLORIDE 25 MG/1
25 TABLET ORAL 3 TIMES DAILY PRN
Qty: 15 TABLET | Refills: 0 | Status: SHIPPED | OUTPATIENT
Start: 2020-07-23 | End: 2020-08-02

## 2020-07-23 RX ORDER — POTASSIUM CHLORIDE 750 MG/1
10 CAPSULE, EXTENDED RELEASE ORAL DAILY
COMMUNITY

## 2020-07-23 RX ORDER — ESTRADIOL 1 MG/1
1 TABLET ORAL DAILY
COMMUNITY

## 2020-07-23 RX ORDER — MECLIZINE HCL 12.5 MG/1
25 TABLET ORAL ONCE
Status: COMPLETED | OUTPATIENT
Start: 2020-07-23 | End: 2020-07-23

## 2020-07-23 RX ADMIN — SODIUM CHLORIDE 500 ML: 9 INJECTION, SOLUTION INTRAVENOUS at 07:44

## 2020-07-23 RX ADMIN — MECLIZINE 25 MG: 12.5 TABLET ORAL at 07:44

## 2020-07-23 ASSESSMENT — ENCOUNTER SYMPTOMS
BACK PAIN: 0
RHINORRHEA: 0
DIARRHEA: 0
SORE THROAT: 0
NAUSEA: 0
ABDOMINAL PAIN: 0
COUGH: 0
VOMITING: 0
SHORTNESS OF BREATH: 0

## 2020-07-23 NOTE — ED PROVIDER NOTES
[facility]  eMERGENCY dEPARTMENT eNCOUnter      Pt Name: Neo Garnett  MRN: [id number]  [place] 4/25/1927  Date of evaluation: 7/23/2020  Provider: Garrett Ricardo MD    [address] [place] [address]       Chief Complaint   Patient presents with    Dizziness     pt states that she woke up with dizziness. HISTORY OF PRESENT ILLNESS   (Location/Symptom, Timing/Onset,Context/Setting, Quality, Duration, Modifying Factors, Severity)  Note limiting factors. Neo Garnett is a 80 y.o. female who presents to the emergency department woke up feeling dizzy approximately 1 hour ago. The patient tells me she was getting up out of bed felt drunk and off balance describes a vertigo spinning type sensation. Tells me she is feeling better she was able to ambulate down the sidewalk to her family members home who brought her here for evaluation. He has been seen here 2 other times they tell me 1 time she is staying hydrated and the other time that he overall attributed to anxiety. She has been admitted for this and had negative MRIs. She denies any focal neurologic symptoms. She denies ever feeling lightheaded or having feelings of syncope. No chest pain shortness of breath or palpitations. HPI    NursingNotes were reviewed. REVIEW OF SYSTEMS    (2-9 systems for level 4, 10 or more for level 5)     Review of Systems   Constitutional: Negative for chills and fever. HENT: Negative for rhinorrhea and sore throat. Respiratory: Negative for cough and shortness of breath. Cardiovascular: Negative for chest pain, palpitations and leg swelling. Gastrointestinal: Negative for abdominal pain, diarrhea, nausea and vomiting. Genitourinary: Negative for dysuria, frequency and urgency. Musculoskeletal: Negative for back pain and neck pain. Neurological: Positive for dizziness. Negative for speech difficulty, light-headedness, numbness and headaches. All other systems reviewed and are negative.            PAST MEDICALHISTORY     Past Medical History:   Diagnosis Date    Heart palpitations     Hypertension          SURGICAL HISTORY       Past Surgical History:   Procedure Laterality Date    CAROTID ENDARTERECTOMY      CHOLECYSTECTOMY      HYSTERECTOMY      KIDNEY STONE SURGERY           CURRENT MEDICATIONS     Previous Medications    CITALOPRAM (CELEXA) 10 MG TABLET    Take 10 mg by mouth daily    CLONIDINE (CATAPRES) 0.1 MG TABLET    Take 0.1 mg by mouth 2 times daily    DILTIAZEM (DILACOR XR) 240 MG EXTENDED RELEASE CAPSULE    Take 240 mg by mouth daily    ESTRADIOL (ESTRACE) 1 MG TABLET    Take 1 mg by mouth daily    MULTIPLE VITAMINS-MINERALS (THERAPEUTIC MULTIVITAMIN-MINERALS) TABLET    Take 1 tablet by mouth daily    PANTOPRAZOLE SODIUM (PROTONIX) 40 MG PACK PACKET    Take 40 mg by mouth every morning (before breakfast)    POTASSIUM CHLORIDE (MICRO-K) 10 MEQ EXTENDED RELEASE CAPSULE    Take 10 mEq by mouth 2 times daily       ALLERGIES     Codeine    FAMILY HISTORY       Family History   Problem Relation Age of Onset    Stroke Mother         CAD, palpitations,  12          SOCIAL HISTORY       Social History     Socioeconomic History    Marital status:       Spouse name: None    Number of children: None    Years of education: None    Highest education level: None   Occupational History    None   Social Needs    Financial resource strain: None    Food insecurity     Worry: None     Inability: None    Transportation needs     Medical: None     Non-medical: None   Tobacco Use    Smoking status: Never Smoker    Smokeless tobacco: Never Used   Substance and Sexual Activity    Alcohol use: No     Alcohol/week: 0.0 standard drinks    Drug use: No    Sexual activity: None   Lifestyle    Physical activity     Days per week: None     Minutes per session: None    Stress: None   Relationships    Social connections     Talks on phone: None     Gets together: None     Attends [employer] service: None     Active member of club or organization: None     Attends meetings of clubs or organizations: None     Relationship status: None    Intimate partner violence     Fear of current or ex partner: None     Emotionally abused: None     Physically abused: None     Forced sexual activity: None   Other Topics Concern    None   Social History Narrative    None       SCREENINGS             PHYSICAL EXAM    (up to 7 for level 4, 8 or more for level 5)     ED Triage Vitals   BP Temp Temp src Pulse Resp SpO2 Height Weight   -- -- -- -- -- -- -- --       Physical Exam  Vitals signs and nursing note reviewed. Constitutional:       Appearance: Normal appearance. She is well-developed. She is not ill-appearing, toxic-appearing or diaphoretic. HENT:      Head: Normocephalic and atraumatic. Right Ear: External ear normal.      Left Ear: External ear normal.      Nose: Nose normal.      Mouth/Throat:      Mouth: Mucous membranes are moist.   Eyes:      Extraocular Movements: Extraocular movements intact. Conjunctiva/sclera: Conjunctivae normal.      Pupils: Pupils are equal, round, and reactive to light. Neck:      Musculoskeletal: Normal range of motion. Trachea: No tracheal deviation. Cardiovascular:      Rate and Rhythm: Normal rate and regular rhythm. Heart sounds: Normal heart sounds. No murmur. Pulmonary:      Effort: No respiratory distress. Breath sounds: Normal breath sounds. No wheezing or rales. Abdominal:      Palpations: Abdomen is soft. There is no mass. Tenderness: There is no abdominal tenderness. Musculoskeletal: Normal range of motion. Skin:     General: Skin is warm and dry. Neurological:      General: No focal deficit present. Mental Status: She is alert and oriented to person, place, and time. GCS: GCS eye subscore is 4. GCS verbal subscore is 5. GCS motor subscore is 6.       Cranial Nerves: No cranial nerve deficit, dysarthria or facial asymmetry. Sensory: No sensory deficit. Motor: No weakness or abnormal muscle tone. Coordination: Coordination normal. Finger-Nose-Finger Test normal.      Gait: Gait is intact. Gait normal.         DIAGNOSTIC RESULTS     EKG: All EKG's areinterpreted by the Emergency Department Physician who either signs or Co-signs this chart in the absence of a cardiologist.  62 normal sinus rhythm no ST changes nondiagnostic EKG      RADIOLOGY:  Non-plain film images such as CT, Ultrasound and MRI are read by the radiologist. Plain radiographic images are visualized and preliminarily interpreted bythe emergency physician with the below findings:          CT HEAD WO CONTRAST   Final Result   No acute intracranial abnormality. Chronic ischemic and atrophic changes. The above findings are recorded on a digital voice clip in PACS. Signed by Dr Eber Castillo on 7/23/2020 7:16 AM              LABS:  Labs Reviewed   CBC WITH AUTO DIFFERENTIAL - Abnormal; Notable for the following components:       Result Value    RBC 3.50 (*)     Hemoglobin 10.9 (*)     Hematocrit 32.5 (*)     MCH 31.1 (*)     Neutrophils % 68.6 (*)     All other components within normal limits   COMPREHENSIVE METABOLIC PANEL - Abnormal; Notable for the following components:    CREATININE 1.1 (*)     GFR Non- 46 (*)     GFR  56 (*)     Total Protein 6.2 (*)     All other components within normal limits   URINE RT REFLEX TO CULTURE - Abnormal; Notable for the following components:    Clarity, UA CLOUDY (*)     All other components within normal limits   TROPONIN       All other labs were within normal range or not returned as of this dictation.     EMERGENCY DEPARTMENT COURSE and DIFFERENTIAL DIAGNOSIS/MDM:   Vitals:    Vitals:    07/23/20 0715 07/23/20 0731 07/23/20 0802 07/23/20 0854   BP: (!) 116/49  (!) 121/49 (!) 123/46   Pulse: 68  54 52   Resp: 18 15 15 16   Temp:       TempSrc:       SpO2: 96% 95% 96% 96% Weight:       Height:           MDM  Number of Diagnoses or Management Options     Amount and/or Complexity of Data Reviewed  Clinical lab tests: ordered and reviewed  Tests in the radiology section of CPT®: ordered and reviewed  Independent visualization of images, tracings, or specimens: yes        Vital signs stable, not orthostatic, ambulatory here, nonfocal neurologic exam, labs and imaging reviewed and reassuring, no concern for cva, maybe mild dehydration, given fluids here, instructed her to get up slowly at home, stable for DC, has appt with PCP tomorrow AM, understands return precautions    CONSULTS:  None    PROCEDURES:  Unless otherwise noted below, none     Procedures    FINAL IMPRESSION      1.  Dizziness          DISPOSITION/PLAN   DISPOSITION        PATIENT REFERRED TO:  Roslyn Tovar MD  [address] [place] [id number] [phone]    Schedule an appointment as soon as possible for a visit on 7/27/2020        DISCHARGE MEDICATIONS:  New Prescriptions    MECLIZINE (ANTIVERT) 25 MG TABLET    Take 1 tablet by mouth 3 times daily as needed for Dizziness          (Please note that portions of this note were completed with a voice recognition program.  Efforts were made to edit thedictations but occasionally words are mis-transcribed.)    Darren Munoz MD (electronically signed)  Attending Emergency Physician        Jaye Garay MD  07/23/20 9688

## 2020-07-23 NOTE — ED TRIAGE NOTES
Patient states that she woke up this morning and states that she was dizzy. Pt states that she gets dizzy at times. Pt states that this is different. Pt states that the whole room was spinning.

## 2020-07-24 LAB
EKG P AXIS: 56 DEGREES
EKG P-R INTERVAL: 170 MS
EKG Q-T INTERVAL: 424 MS
EKG QRS DURATION: 80 MS
EKG QTC CALCULATION (BAZETT): 427 MS
EKG T AXIS: 54 DEGREES

## 2020-07-24 PROCEDURE — 93010 ELECTROCARDIOGRAM REPORT: CPT | Performed by: INTERNAL MEDICINE

## 2020-08-16 ENCOUNTER — APPOINTMENT (OUTPATIENT)
Dept: CT IMAGING | Age: 85
End: 2020-08-16
Payer: MEDICARE

## 2020-08-16 ENCOUNTER — HOSPITAL ENCOUNTER (EMERGENCY)
Age: 85
Discharge: HOME | End: 2020-08-16
Attending: EMERGENCY MEDICINE
Payer: MEDICARE

## 2020-08-16 VITALS
BODY MASS INDEX: 18.07 KG/M2 | DIASTOLIC BLOOD PRESSURE: 76 MMHG | HEART RATE: 80 BPM | WEIGHT: 102 LBS | RESPIRATION RATE: 16 BRPM | TEMPERATURE: 97.7 F | HEIGHT: 63 IN | SYSTOLIC BLOOD PRESSURE: 120 MMHG | OXYGEN SATURATION: 98 %

## 2020-08-16 PROCEDURE — 99283 EMERGENCY DEPT VISIT LOW MDM: CPT

## 2020-08-16 PROCEDURE — 72192 CT PELVIS W/O DYE: CPT

## 2020-08-16 ASSESSMENT — ENCOUNTER SYMPTOMS
BACK PAIN: 0
SHORTNESS OF BREATH: 0
ABDOMINAL PAIN: 0

## 2020-08-16 ASSESSMENT — PAIN SCALES - GENERAL: PAINLEVEL_OUTOF10: 1

## 2020-08-16 NOTE — ED PROVIDER NOTES
KIDNEY STONE SURGERY           CURRENT MEDICATIONS       Discharge Medication List as of 2020  1:38 PM      CONTINUE these medications which have NOT CHANGED    Details   estradiol (ESTRACE) 1 MG tablet Take 1 mg by mouth dailyHistorical Med      potassium chloride (MICRO-K) 10 MEQ extended release capsule Take 10 mEq by mouth 2 times dailyHistorical Med      Multiple Vitamins-Minerals (THERAPEUTIC MULTIVITAMIN-MINERALS) tablet Take 1 tablet by mouth dailyHistorical Med      citalopram (CELEXA) 10 MG tablet Take 10 mg by mouth dailyHistorical Med      pantoprazole sodium (PROTONIX) 40 MG PACK packet Take 40 mg by mouth every morning (before breakfast)Historical Med      cloNIDine (CATAPRES) 0.1 MG tablet Take 0.1 mg by mouth 2 times dailyHistorical Med      diltiazem (DILACOR XR) 240 MG extended release capsule Take 240 mg by mouth dailyHistorical Med             ALLERGIES     Codeine    FAMILY HISTORY       Family History   Problem Relation Age of Onset    Stroke Mother         CAD, palpitations,  12          SOCIAL HISTORY       Social History     Socioeconomic History    Marital status:       Spouse name: None    Number of children: None    Years of education: None    Highest education level: None   Occupational History    None   Social Needs    Financial resource strain: None    Food insecurity     Worry: None     Inability: None    Transportation needs     Medical: None     Non-medical: None   Tobacco Use    Smoking status: Never Smoker    Smokeless tobacco: Never Used   Substance and Sexual Activity    Alcohol use: No     Alcohol/week: 0.0 standard drinks    Drug use: No    Sexual activity: None   Lifestyle    Physical activity     Days per week: None     Minutes per session: None    Stress: None   Relationships    Social connections     Talks on phone: None     Gets together: None     Attends [employer] service: None     Active member of club or organization: None Attends meetings of clubs or organizations: None     Relationship status: None    Intimate partner violence     Fear of current or ex partner: None     Emotionally abused: None     Physically abused: None     Forced sexual activity: None   Other Topics Concern    None   Social History Narrative    None       SCREENINGS             PHYSICAL EXAM    (up to 7 for level 4, 8 or more for level 5)     ED Triage Vitals [08/16/20 1218]   BP Temp Temp src Pulse Resp SpO2 Height Weight   (!) 175/72 98 °F (36.7 °C) -- 80 20 97 % 5' 3\" (1.6 m) 102 lb (46.3 kg)       Physical Exam  Vitals signs and nursing note reviewed. Constitutional:       General: She is not in acute distress. Appearance: Normal appearance. She is not ill-appearing, toxic-appearing or diaphoretic. Comments: Well-appearing for 89   HENT:      Head: Normocephalic and atraumatic. Neck:      Musculoskeletal: Normal range of motion and neck supple. Cardiovascular:      Pulses: Normal pulses. Pulmonary:      Effort: Pulmonary effort is normal.      Breath sounds: Normal breath sounds. Abdominal:      General: Abdomen is flat. Tenderness: There is no abdominal tenderness. Musculoskeletal: Normal range of motion. General: Tenderness present. Comments: Full range of motion in the hips. No pain on palpation of the true hip. There is pain in the right buttocks. There is no obvious laceration or cellulitis. Normal range of motion of the knees no tenderness of the femur. Good pedal pulses. No back pain   Skin:     General: Skin is warm and dry. Capillary Refill: Capillary refill takes less than 2 seconds. Neurological:      General: No focal deficit present. Mental Status: She is alert and oriented to person, place, and time.    Psychiatric:         Mood and Affect: Mood normal.         Behavior: Behavior normal.         DIAGNOSTIC RESULTS     EKG: All EKG's are interpreted by the Emergency Department Physician who either signs or Co-signs this chart in the absence of a cardiologist.        RADIOLOGY:   Non-plain film images such as CT, Ultrasound and MRI are read by the radiologist. Plainradiographic images are visualized and preliminarily interpreted by the emergency physician with the below findings:        Interpretation per the Radiologist below, if available at the time of this note:    CT PELVIS WO CONTRAST Additional Contrast? None   Final Result   1. No evidence of occult fracture or acute osseous findings. 2. Degenerative changes of the lower lumbar spine and trochanteric   aspects of both hips. 3. Induration of subcutaneous fat planes in the deep left gluteal lobe   which may represent cellulitis, no soft tissue abscess is identified. Signed by Dr Nadine Pichardo. Anderson on 8/16/2020 1:32 PM            ED BEDSIDE ULTRASOUND:   Performed by ED Physician - none    LABS:  Labs Reviewed - No data to display    All other labs were within normal range or not returned as of this dictation. EMERGENCY DEPARTMENT COURSE and DIFFERENTIALDIAGNOSIS/MDM:   Vitals:    Vitals:    08/16/20 1218 08/16/20 1408   BP: (!) 175/72 120/76   Pulse: 80 80   Resp: 20 16   Temp: 98 °F (36.7 °C) 97.7 °F (36.5 °C)   TempSrc:  Temporal   SpO2: 97% 98%   Weight: 102 lb (46.3 kg)    Height: 5' 3\" (1.6 m)        MDM  Number of Diagnoses or Management Options  Right buttock pain:   Diagnosis management comments: Patient with buttocks pain. CT scan done to rule out occult fracture. I did not think an x-ray would be sensitive enough in this 80year-old with likely osteopenia. There is no fracture seen. She stable for discharge he is ambulating without difficulty this is not sciatica. Point tenderness treat with local therapies Tylenol.   She is otherwise stable       Amount and/or Complexity of Data Reviewed  Tests in the radiology section of CPT®: ordered and reviewed    Patient Progress  Patient progress: stable        CONSULTS:  None    PROCEDURES:  Unless otherwise notedbelow, none     Procedures    FINAL IMPRESSION     1.  Right buttock pain          DISPOSITION/PLAN   DISPOSITION Decision To Discharge 08/16/2020 01:37:28 PM      PATIENT REFERRED TO:  Tuyet Rush MD  [address] [place] [address]  [address] [id number] [phone]    Schedule an appointment as soon as possible for a visit in 1 week        DISCHARGE MEDICATIONS:  Discharge Medication List as of 8/16/2020  1:38 PM             (Please note that portions of this note were completed with a voice recognition program.  Efforts were made to edit the dictations butoccasionally words are mis-transcribed.)    Cristina Hassan MD (electronically signed)  AttendingEmergency Physician         Cristina Hassan MD  08/16/20 0612

## 2020-08-16 NOTE — ED NOTES
ASSESSMENT:    SKIN:  Warm, dry, pink. Cap refill < 2 sec  CARDIAC:  S1 S2 noted  LUNGS: clear upper and lower lobes. Respirations even and unlabored. ABDOMEN: bowel sounds noted upper and lower quadrants. Soft and tender. EXTREMITIES: bilateral DP and PT. No edema noted. Pt co R leg pain and weakness with ambulation. Pt alert and oriented x4. Pupils equal and reactive. No distress noted. Side rails up and call light within reach.        Ramin Toribio RN  08/16/20 [id number]

## 2020-09-07 ENCOUNTER — APPOINTMENT (OUTPATIENT)
Dept: GENERAL RADIOLOGY | Age: 85
End: 2020-09-07
Payer: MEDICARE

## 2020-09-07 ENCOUNTER — APPOINTMENT (OUTPATIENT)
Dept: CT IMAGING | Age: 85
End: 2020-09-07
Payer: MEDICARE

## 2020-09-07 ENCOUNTER — HOSPITAL ENCOUNTER (EMERGENCY)
Age: 85
Discharge: HOME | End: 2020-09-07
Payer: MEDICARE

## 2020-09-07 VITALS
OXYGEN SATURATION: 93 % | BODY MASS INDEX: 18.07 KG/M2 | SYSTOLIC BLOOD PRESSURE: 161 MMHG | HEIGHT: 63 IN | RESPIRATION RATE: 18 BRPM | HEART RATE: 76 BPM | WEIGHT: 102 LBS | TEMPERATURE: 97.9 F | DIASTOLIC BLOOD PRESSURE: 43 MMHG

## 2020-09-07 LAB
ALBUMIN SERPL-MCNC: 3.9 G/DL (ref 3.5–5.2)
ALP BLD-CCNC: 43 U/L (ref 35–104)
ALT SERPL-CCNC: 7 U/L (ref 5–33)
ANION GAP SERPL CALCULATED.3IONS-SCNC: 12 MMOL/L (ref 7–19)
AST SERPL-CCNC: 13 U/L (ref 5–32)
BACTERIA: NEGATIVE /HPF
BASOPHILS ABSOLUTE: 0 K/UL (ref 0–0.2)
BASOPHILS RELATIVE PERCENT: 0.1 % (ref 0–1)
BILIRUB SERPL-MCNC: <0.2 MG/DL (ref 0.2–1.2)
BILIRUBIN URINE: NEGATIVE
BLOOD, URINE: ABNORMAL
BUN BLDV-MCNC: 18 MG/DL (ref 8–23)
CALCIUM SERPL-MCNC: 8.8 MG/DL (ref 8.2–9.6)
CHLORIDE BLD-SCNC: 100 MMOL/L (ref 98–111)
CLARITY: CLEAR
CO2: 27 MMOL/L (ref 22–29)
COLOR: YELLOW
CREAT SERPL-MCNC: 0.8 MG/DL (ref 0.5–0.9)
CRYSTALS, UA: ABNORMAL /HPF
EOSINOPHILS ABSOLUTE: 0.1 K/UL (ref 0–0.6)
EOSINOPHILS RELATIVE PERCENT: 1.2 % (ref 0–5)
EPITHELIAL CELLS, UA: 9 /HPF (ref 0–5)
GFR AFRICAN AMERICAN: >59
GFR NON-AFRICAN AMERICAN: >60
GLUCOSE BLD-MCNC: 88 MG/DL (ref 74–109)
GLUCOSE URINE: NEGATIVE MG/DL
HCT VFR BLD CALC: 34.3 % (ref 37–47)
HEMOGLOBIN: 11.6 G/DL (ref 12–16)
HYALINE CASTS: 3 /HPF (ref 0–8)
IMMATURE GRANULOCYTES #: 0 K/UL
KETONES, URINE: NEGATIVE MG/DL
LEUKOCYTE ESTERASE, URINE: NEGATIVE
LYMPHOCYTES ABSOLUTE: 1.4 K/UL (ref 1.1–4.5)
LYMPHOCYTES RELATIVE PERCENT: 18.3 % (ref 20–40)
MCH RBC QN AUTO: 30.9 PG (ref 27–31)
MCHC RBC AUTO-ENTMCNC: 33.8 G/DL (ref 33–37)
MCV RBC AUTO: 91.2 FL (ref 81–99)
MONOCYTES ABSOLUTE: 0.5 K/UL (ref 0–0.9)
MONOCYTES RELATIVE PERCENT: 6.9 % (ref 0–10)
NEUTROPHILS ABSOLUTE: 5.5 K/UL (ref 1.5–7.5)
NEUTROPHILS RELATIVE PERCENT: 73.1 % (ref 50–65)
NITRITE, URINE: NEGATIVE
PDW BLD-RTO: 12.8 % (ref 11.5–14.5)
PH UA: 6 (ref 5–8)
PLATELET # BLD: 160 K/UL (ref 130–400)
PMV BLD AUTO: 10.4 FL (ref 9.4–12.3)
POTASSIUM REFLEX MAGNESIUM: 4 MMOL/L (ref 3.5–5)
PROTEIN UA: ABNORMAL MG/DL
RBC # BLD: 3.76 M/UL (ref 4.2–5.4)
RBC UA: 2 /HPF (ref 0–4)
SODIUM BLD-SCNC: 139 MMOL/L (ref 136–145)
SPECIFIC GRAVITY UA: 1.02 (ref 1–1.03)
TOTAL PROTEIN: 6.6 G/DL (ref 6.6–8.7)
UROBILINOGEN, URINE: 0.2 E.U./DL
WBC # BLD: 7.6 K/UL (ref 4.8–10.8)
WBC UA: 1 /HPF (ref 0–5)

## 2020-09-07 PROCEDURE — 70486 CT MAXILLOFACIAL W/O DYE: CPT

## 2020-09-07 PROCEDURE — 70450 CT HEAD/BRAIN W/O DYE: CPT

## 2020-09-07 PROCEDURE — 80053 COMPREHEN METABOLIC PANEL: CPT

## 2020-09-07 PROCEDURE — 85025 COMPLETE CBC W/AUTO DIFF WBC: CPT

## 2020-09-07 PROCEDURE — 71045 X-RAY EXAM CHEST 1 VIEW: CPT

## 2020-09-07 PROCEDURE — 99282 EMERGENCY DEPT VISIT SF MDM: CPT

## 2020-09-07 PROCEDURE — 93005 ELECTROCARDIOGRAM TRACING: CPT | Performed by: PHYSICIAN ASSISTANT

## 2020-09-07 PROCEDURE — 72125 CT NECK SPINE W/O DYE: CPT

## 2020-09-07 PROCEDURE — 36415 COLL VENOUS BLD VENIPUNCTURE: CPT

## 2020-09-07 PROCEDURE — 99283 EMERGENCY DEPT VISIT LOW MDM: CPT

## 2020-09-07 PROCEDURE — 12011 RPR F/E/E/N/L/M 2.5 CM/<: CPT

## 2020-09-07 PROCEDURE — 81001 URINALYSIS AUTO W/SCOPE: CPT

## 2020-09-09 LAB
EKG P AXIS: -26 DEGREES
EKG P-R INTERVAL: 146 MS
EKG Q-T INTERVAL: 458 MS
EKG QRS DURATION: 90 MS
EKG QTC CALCULATION (BAZETT): 450 MS
EKG T AXIS: 34 DEGREES

## 2020-09-09 PROCEDURE — 93010 ELECTROCARDIOGRAM REPORT: CPT | Performed by: INTERNAL MEDICINE

## 2020-09-12 ASSESSMENT — ENCOUNTER SYMPTOMS
COUGH: 0
EYE PAIN: 0
ABDOMINAL DISTENTION: 0
NAUSEA: 0
COLOR CHANGE: 0
PHOTOPHOBIA: 0
BACK PAIN: 0
SHORTNESS OF BREATH: 0
EYE DISCHARGE: 0
RHINORRHEA: 0
ABDOMINAL PAIN: 0
APNEA: 0
SORE THROAT: 0

## 2020-09-12 NOTE — ED PROVIDER NOTES
[facility] [place] [facility] EMERGENCY [place]PT  eMERGENCYdEPARTMENT eNCOUnter      Pt Name: Lashawn Muñoz  MRN: [id number]  [place] 4/25/1927  Date of evaluation: 9/7/2020  Provider:YANNA Desouza    CHIEF COMPLAINT       Chief Complaint   Patient presents with    Laceration     R eyebrow laceration, denies LOC         HISTORY OF PRESENT ILLNESS  (Location/Symptom, Timing/Onset, Context/Setting, Quality, Duration, Modifying Factors, Severity.)   Lashawn Muñoz is a 80 y.o. female who presents to the emergency department with complaints of mechanical fall causing her to strike her head in her home this happened on hardwood floor she has a small laceration to the right eyebrow no facial swelling. Patient denies any loss of consciousness. Her daughter wants her fully assessed to make sure this was not some sort of cardiac/syncopal event. The patient is alert and oriented and denies this to me. She is ambulated since this event took place. She denies any arthralgias any neck pain. No blood thinners on board. Patient has no facial swelling dental pain. No trouble breathing. Bleeding well controlled in regards to laceration. No vision changes. No foreign body sensation in the eye. HPI    Nursing Notes were reviewed and I agree. REVIEW OF SYSTEMS    (2-9 systems for level 4, 10 or more for level 5)     Review of Systems   Constitutional: Negative for activity change, appetite change, chills and fever. HENT: Negative for congestion, postnasal drip, rhinorrhea and sore throat. Eyes: Negative for photophobia, pain, discharge and visual disturbance. Respiratory: Negative for apnea, cough and shortness of breath. Cardiovascular: Negative for chest pain and leg swelling. Gastrointestinal: Negative for abdominal distention, abdominal pain and nausea. Genitourinary: Negative for vaginal bleeding. Musculoskeletal: Negative for arthralgias, back pain, joint swelling, neck pain and neck stiffness.    Skin: Positive for wound. Negative for color change and rash. Neurological: Negative for dizziness, syncope, facial asymmetry and headaches. Hematological: Negative for adenopathy. Does not bruise/bleed easily. Psychiatric/Behavioral: Negative for agitation, behavioral problems and confusion. Except as noted above the remainder of the review of systems was reviewed and negative. PAST MEDICAL HISTORY     Past Medical History:   Diagnosis Date    Heart palpitations     Hypertension          SURGICAL HISTORY       Past Surgical History:   Procedure Laterality Date    CAROTID ENDARTERECTOMY      CHOLECYSTECTOMY      HYSTERECTOMY      KIDNEY STONE SURGERY           CURRENT MEDICATIONS       Discharge Medication List as of 2020  9:30 PM      CONTINUE these medications which have NOT CHANGED    Details   estradiol (ESTRACE) 1 MG tablet Take 1 mg by mouth dailyHistorical Med      potassium chloride (MICRO-K) 10 MEQ extended release capsule Take 10 mEq by mouth 2 times dailyHistorical Med      Multiple Vitamins-Minerals (THERAPEUTIC MULTIVITAMIN-MINERALS) tablet Take 1 tablet by mouth dailyHistorical Med      citalopram (CELEXA) 10 MG tablet Take 10 mg by mouth dailyHistorical Med      pantoprazole sodium (PROTONIX) 40 MG PACK packet Take 40 mg by mouth every morning (before breakfast)Historical Med      cloNIDine (CATAPRES) 0.1 MG tablet Take 0.1 mg by mouth 2 times dailyHistorical Med      diltiazem (DILACOR XR) 240 MG extended release capsule Take 240 mg by mouth dailyHistorical Med             ALLERGIES     Codeine    FAMILY HISTORY       Family History   Problem Relation Age of Onset    Stroke Mother         CAD, palpitations,  12          SOCIAL HISTORY       Social History     Socioeconomic History    Marital status:       Spouse name: None    Number of children: None    Years of education: None    Highest education level: None   Occupational History    None   Social Needs    Financial resource strain: None    Food insecurity     Worry: None     Inability: None    Transportation needs     Medical: None     Non-medical: None   Tobacco Use    Smoking status: Never Smoker    Smokeless tobacco: Never Used   Substance and Sexual Activity    Alcohol use: No     Alcohol/week: 0.0 standard drinks    Drug use: No    Sexual activity: None   Lifestyle    Physical activity     Days per week: None     Minutes per session: None    Stress: None   Relationships    Social connections     Talks on phone: None     Gets together: None     Attends [employer]ism service: None     Active member of club or organization: None     Attends meetings of clubs or organizations: None     Relationship status: None    Intimate partner violence     Fear of current or ex partner: None     Emotionally abused: None     Physically abused: None     Forced sexual activity: None   Other Topics Concern    None   Social History Narrative    None       SCREENINGS           PHYSICAL EXAM    (up to 7 forlevel 4, 8 or more for level 5)     ED Triage Vitals [09/07/20 1945]   BP Temp Temp src Pulse Resp SpO2 Height Weight   (!) 158/55 97.9 °F (36.6 °C) -- 76 18 96 % 5' 3\" (1.6 m) 102 lb (46.3 kg)       Physical Exam  Vitals signs and nursing note reviewed. Constitutional:       General: She is not in acute distress. Appearance: She is well-developed. She is not diaphoretic. HENT:      Head: Normocephalic. Right Ear: External ear normal.      Left Ear: External ear normal.      Mouth/Throat:      Pharynx: No oropharyngeal exudate. Eyes:      General:         Right eye: No discharge. Left eye: No discharge. Pupils: Pupils are equal, round, and reactive to light. Neck:      Musculoskeletal: Normal range of motion and neck supple. Thyroid: No thyromegaly. Cardiovascular:      Rate and Rhythm: Normal rate and regular rhythm. Heart sounds: Normal heart sounds. No murmur. No friction rub. density right upper lung similar to February 23, 2020. Follow-up is suggested to ensure that this resolves   Signed by Dr Sammy Blevins on 9/7/2020 8:08 PM          LABS:  Labs Reviewed   CBC WITH AUTO DIFFERENTIAL - Abnormal; Notable for the following components:       Result Value    RBC 3.76 (*)     Hemoglobin 11.6 (*)     Hematocrit 34.3 (*)     Neutrophils % 73.1 (*)     Lymphocytes % 18.3 (*)     All other components within normal limits   URINE RT REFLEX TO CULTURE - Abnormal; Notable for the following components:    Blood, Urine TRACE (*)     Protein, UA TRACE (*)     All other components within normal limits   MICROSCOPIC URINALYSIS - Abnormal; Notable for the following components:    Bacteria, UA NEGATIVE (*)     Crystals, UA NEG (*)     All other components within normal limits   COMPREHENSIVE METABOLIC PANEL W/ REFLEX TO MG FOR LOW K       All other labs were within normal range or notreturned as of this dictation. RE-ASSESSMENT        EMERGENCY DEPARTMENT COURSE and DIFFERENTIAL DIAGNOSIS/MDM:   Vitals:    Vitals:    09/07/20 1945 09/07/20 2047 09/07/20 2102 09/07/20 2131   BP: (!) 158/55 (!) 153/42 (!) 162/46 (!) 161/43   Pulse: 76      Resp: 18      Temp: 97.9 °F (36.6 °C)      SpO2: 96% 93%     Weight: 102 lb (46.3 kg)      Height: 5' 3\" (1.6 m)          MDM  Patient has no acute findings on imaging. Make her aware of all incidental findings and no changes in the lung \"density\". Patient has a very small laceration to right eyebrow that approximates well we elect to utilize an adhesive glue. It holds together well. She is educated to watch for any signs and symptoms of infection. We will discharge her at this time. We witnessed her ambulate without issue here in ED daughter has no other questions.     PROCEDURES:    Lac Repair    Date/Time: 9/12/2020 10:11 AM  Performed by: YANNA Bates  Authorized by: YANNA Bates     Consent:     Consent obtained:  Verbal    Consent given by: Patient and healthcare agent    Risks discussed:  Infection, pain and poor cosmetic result  Anesthesia (see MAR for exact dosages): Anesthesia method:  None  Laceration details:     Location:  Face    Face location:  R eyebrow    Length (cm):  1  Repair type:     Repair type:  Simple  Exploration:     Hemostasis achieved with:  Direct pressure    Wound exploration: wound explored through full range of motion      Contaminated: no    Treatment:     Area cleansed with:  Betadine and saline    Amount of cleaning:  Standard    Irrigation solution:  Sterile saline    Visualized foreign bodies/material removed: no    Skin repair:     Repair method:  Tissue adhesive  Approximation:     Approximation:  Close  Post-procedure details:     Dressing:  Open (no dressing)    Patient tolerance of procedure: Tolerated well, no immediate complications          FINAL IMPRESSION      1. Facial laceration, initial encounter    2.  Fall, initial encounter          DISPOSITION/PLAN   DISPOSITION Decision To Discharge 09/07/2020 09:29:28 PM      PATIENT REFERRED TO:  [facility] EMERGENCY DEPT  [facility]  [phone]    If symptoms worsen    Domi Jarvis MD  [address] [place] [address]  [phone]      As needed      DISCHARGE MEDICATIONS:  Discharge Medication List as of 9/7/2020  9:30 PM          (Please note that portions of this note were completed with a voice recognition program.  Efforts were made to edit the dictations but occasionallywords are mis-transcribed.)    Erin More, Amira [address] [place] [address]  09/12/20 1011

## 2020-10-16 ENCOUNTER — OFFICE VISIT (OUTPATIENT)
Dept: INTERNAL MEDICINE | Facility: CLINIC | Age: 85
End: 2020-10-16

## 2020-10-16 VITALS
HEART RATE: 83 BPM | OXYGEN SATURATION: 98 % | DIASTOLIC BLOOD PRESSURE: 52 MMHG | HEIGHT: 63 IN | TEMPERATURE: 98 F | WEIGHT: 102 LBS | SYSTOLIC BLOOD PRESSURE: 100 MMHG | BODY MASS INDEX: 18.07 KG/M2

## 2020-10-16 DIAGNOSIS — R01.1 HEART MURMUR: ICD-10-CM

## 2020-10-16 DIAGNOSIS — M10.9 ACUTE GOUT INVOLVING TOE OF RIGHT FOOT, UNSPECIFIED CAUSE: Primary | ICD-10-CM

## 2020-10-16 PROCEDURE — 99213 OFFICE O/P EST LOW 20 MIN: CPT | Performed by: NURSE PRACTITIONER

## 2020-10-16 RX ORDER — METHYLPREDNISOLONE 4 MG/1
TABLET ORAL
Qty: 1 EACH | Refills: 0 | Status: SHIPPED | OUTPATIENT
Start: 2020-10-16 | End: 2020-12-02

## 2020-10-16 NOTE — PROGRESS NOTES
Subjective   Sabiha hSerwood is a 89 y.o. female.   Chief Complaint   Patient presents with   • Foot Pain     right foot red and swollen, stings,usualy has issues but started getting worse yesterday        Sabiha presents today with complaints of right foot redness and swelling.  She reports this started about 2 days ago.  Her chart lists a history of gout but patient does not remember this.  She has a history of dementia and is in the office on her own today due to COVID-19 restrictions. She denies any injury to the foot.        The following portions of the patient's history were reviewed and updated as appropriate: allergies, current medications, past family history, past medical history, past social history, past surgical history and problem list.    Review of Systems   Constitutional: Negative for activity change, appetite change, fatigue, fever, unexpected weight gain and unexpected weight loss.   HENT: Negative for swollen glands, trouble swallowing and voice change.    Eyes: Negative for blurred vision and visual disturbance.   Respiratory: Negative for cough and shortness of breath.    Cardiovascular: Negative for chest pain, palpitations and leg swelling.   Gastrointestinal: Negative for abdominal pain, constipation, diarrhea, nausea, vomiting and indigestion.   Endocrine: Negative for cold intolerance, heat intolerance, polydipsia and polyphagia.   Genitourinary: Negative for dysuria and frequency.   Musculoskeletal: Negative for arthralgias, back pain, joint swelling and neck pain.        Right foot pain and redness   Skin: Negative for color change, rash and skin lesions.   Neurological: Negative for dizziness, weakness, headache, memory problem and confusion.   Hematological: Does not bruise/bleed easily.   Psychiatric/Behavioral: Negative for agitation, hallucinations and suicidal ideas. The patient is not nervous/anxious.        Objective   Past Medical History:   Diagnosis Date   • Acute renal  failure syndrome (CMS/HCC)    • Arrhythmia    • Diverticulitis    • Hiatal hernia    • Hypertension    • UTI (urinary tract infection)    • Vertigo       Past Surgical History:   Procedure Laterality Date   • CHOLECYSTECTOMY     • COLONOSCOPY N/A 5/10/2019    Procedure: COLONOSCOPY WITH ANESTHESIA;  Surgeon: Steven Bassett MD;  Location: [facility] ENDOSCOPY;  Service: Gastroenterology   • ENDOSCOPY N/A 5/10/2019    Procedure: ESOPHAGOGASTRODUODENOSCOPY WITH ANESTHESIA;  Surgeon: Steven Bassett MD;  Location: [facility] ENDOSCOPY;  Service: Gastroenterology   • HYSTERECTOMY          Current Outpatient Medications:   •  aspirin 81 MG EC tablet, Take 81 mg by mouth Daily., Disp: , Rfl:   •  Calcium Carbonate-Vitamin D (CALTRATE 600+D PO), Take 1 tablet by mouth Daily., Disp: , Rfl:   •  chlorthalidone (HYGROTON) 25 MG tablet, Take 25 mg by mouth 2 (Two) Times a Day., Disp: , Rfl:   •  citalopram (CeleXA) 10 MG tablet, Take 1 tablet by mouth Daily., Disp: 30 tablet, Rfl: 11  •  CloNIDine (CATAPRES) 0.1 MG tablet, Take 0.1 mg by mouth 2 (Two) Times a Day., Disp: , Rfl:   •  diltiaZEM CD (CARDIZEM CD) 240 MG 24 hr capsule, Take 240 mg by mouth Daily., Disp: , Rfl:   •  estradiol (ESTRACE) 1 MG tablet, Take 1 tablet by mouth Daily., Disp: , Rfl:   •  fluticasone (FLONASE) 50 MCG/ACT nasal spray, 1 spray into the nostril(s) as directed by provider., Disp: , Rfl:   •  isosorbide mononitrate (IMDUR) 30 MG 24 hr tablet, Take 1 tablet by mouth Daily., Disp: , Rfl:   •  LORazepam (ATIVAN) 0.5 MG tablet, TAKE 1 TABLET EVERY 8 HOURS AS NEEDED FOR ANXIETY., Disp: 30 tablet, Rfl: 5  •  megestrol (MEGACE ES) 625 MG/5ML suspension, Take 5 mL by mouth Daily., Disp: , Rfl:   •  Multiple Vitamins-Minerals (MULTIPLE VITAMINS/WOMENS PO), Take 1 tablet by mouth Daily., Disp: , Rfl:   •  nitroglycerin (Nitrostat) 0.4 MG SL tablet, Place  under the tongue., Disp: , Rfl:   •  pantoprazole (PROTONIX) 40 MG EC tablet, Take 40 mg by mouth  Daily., Disp: , Rfl:   •  potassium chloride (K-DUR,KLOR-CON) 10 MEQ CR tablet, Take 1 tablet by mouth Daily., Disp: , Rfl:   •  spironolactone (ALDACTONE) 25 MG tablet, Take 25 mg by mouth 2 (Two) Times a Day., Disp: , Rfl:   •  vitamin D (ERGOCALCIFEROL) 1.25 MG (18405 UT) capsule capsule, Take 1 capsule by mouth 1 (One) Time Per Week., Disp: , Rfl:   •  methylPREDNISolone (MEDROL) 4 MG dose pack, Take as directed on package instructions., Disp: 1 each, Rfl: 0    Current Facility-Administered Medications:   •  cyanocobalamin injection 1,000 mcg, 1,000 mcg, Intramuscular, Q28 Days, Loc Sinclair MD, 1,000 mcg at 03/09/20 1325     Vitals:    10/16/20 1431   BP: 100/52   Pulse: 83   Temp: 98 °F (36.7 °C)   SpO2: 98%         10/16/20  1431   Weight: 46.3 kg (102 lb)           Physical Exam  Constitutional:       General: She is not in acute distress.     Appearance: She is well-developed.   HENT:      Head: Normocephalic.      Right Ear: External ear normal.      Left Ear: External ear normal.      Mouth/Throat:      Mouth: No oral lesions.   Eyes:      Conjunctiva/sclera: Conjunctivae normal.   Cardiovascular:      Rate and Rhythm: Normal rate and regular rhythm.      Pulses: Normal pulses.           Dorsalis pedis pulses are 2+ on the right side and 2+ on the left side.      Heart sounds: Murmur present. Systolic murmur present with a grade of 2/6.   Pulmonary:      Effort: Pulmonary effort is normal.      Breath sounds: Normal breath sounds.   Musculoskeletal:        Feet:    Feet:      Comments: Erythema and swelling noted to right great toe extending up the foot.  No breaks in the skin noted.  Mild increase in temperature.   Skin:     General: Skin is warm and dry.   Neurological:      Mental Status: She is alert and oriented to person, place, and time.      Gait: Gait normal.   Psychiatric:         Mood and Affect: Mood normal.         Speech: Speech normal.         Behavior: Behavior normal.         Thought  Content: Thought content normal.           Assessment/Plan   Diagnoses and all orders for this visit:    1. Acute gout involving toe of right foot, unspecified cause (Primary)  -     methylPREDNISolone (MEDROL) 4 MG dose pack; Take as directed on package instructions.  Dispense: 1 each; Refill: 0    2. Heart murmur      Will cover with steroid pack today for gout flare.  I walked patient to her vehicle and discussed with her daughter who was waiting to drive her.  I have also informed of heart murmur, which is likely not a new finding based on her cardiac history.  Patient denies any symptoms of chest pain or shortness of breath.  I have requested her daughter help monitor for this and if there are any changes will need further evaluation in the office.      Patient to return when not on steroids to get flu shot.

## 2020-10-23 ENCOUNTER — TELEPHONE (OUTPATIENT)
Dept: INTERNAL MEDICINE | Facility: CLINIC | Age: 85
End: 2020-10-23

## 2020-10-23 DIAGNOSIS — F41.9 ANXIETY: ICD-10-CM

## 2020-10-23 RX ORDER — LORAZEPAM 0.5 MG/1
0.5 TABLET ORAL EVERY 8 HOURS PRN
Qty: 30 TABLET | Refills: 5 | Status: SHIPPED | OUTPATIENT
Start: 2020-10-23 | End: 2021-01-26 | Stop reason: ALTCHOICE

## 2020-10-23 NOTE — TELEPHONE ENCOUNTER
Patient daughter Alix called and wanted to request to have Rx of LORazepam (ATIVAN) 0.5 MG tablet called in due to patient being anxious at night and unable to sleep.       Please contact patient and advise once Rx is filled.     Patient contact # [phone].    [facility] confirmed

## 2020-11-03 PROBLEM — N17.9 AKI (ACUTE KIDNEY INJURY) (HCC): Status: RESOLVED | Noted: 2019-05-26 | Resolved: 2020-11-03

## 2020-12-02 ENCOUNTER — TELEPHONE (OUTPATIENT)
Dept: INTERNAL MEDICINE | Facility: CLINIC | Age: 85
End: 2020-12-02

## 2020-12-02 ENCOUNTER — OFFICE VISIT (OUTPATIENT)
Dept: INTERNAL MEDICINE | Facility: CLINIC | Age: 85
End: 2020-12-02

## 2020-12-02 VITALS
DIASTOLIC BLOOD PRESSURE: 92 MMHG | OXYGEN SATURATION: 96 % | SYSTOLIC BLOOD PRESSURE: 160 MMHG | HEIGHT: 63 IN | TEMPERATURE: 97.1 F | HEART RATE: 86 BPM | BODY MASS INDEX: 18.61 KG/M2 | WEIGHT: 105 LBS

## 2020-12-02 DIAGNOSIS — I10 ESSENTIAL HYPERTENSION: ICD-10-CM

## 2020-12-02 DIAGNOSIS — Z23 NEED FOR IMMUNIZATION AGAINST INFLUENZA: Primary | ICD-10-CM

## 2020-12-02 DIAGNOSIS — I65.23 ARTERIOSCLEROSIS OF BOTH CAROTID ARTERIES: ICD-10-CM

## 2020-12-02 DIAGNOSIS — E78.2 MIXED HYPERLIPIDEMIA: ICD-10-CM

## 2020-12-02 DIAGNOSIS — D53.1 VITAMIN B12 DEFICIENT MEGALOBLASTIC ANEMIA: ICD-10-CM

## 2020-12-02 DIAGNOSIS — F01.50 VASCULAR DEMENTIA WITHOUT BEHAVIORAL DISTURBANCE (HCC): ICD-10-CM

## 2020-12-02 PROBLEM — Z86.73 HISTORY OF TRANSIENT ISCHEMIC ATTACK: Status: ACTIVE | Noted: 2020-12-02

## 2020-12-02 PROCEDURE — 90694 VACC AIIV4 NO PRSRV 0.5ML IM: CPT | Performed by: INTERNAL MEDICINE

## 2020-12-02 PROCEDURE — G0008 ADMIN INFLUENZA VIRUS VAC: HCPCS | Performed by: INTERNAL MEDICINE

## 2020-12-02 PROCEDURE — 99214 OFFICE O/P EST MOD 30 MIN: CPT | Performed by: INTERNAL MEDICINE

## 2020-12-02 PROCEDURE — 96372 THER/PROPH/DIAG INJ SC/IM: CPT | Performed by: INTERNAL MEDICINE

## 2020-12-02 RX ORDER — ERGOCALCIFEROL 1.25 MG/1
50000 CAPSULE ORAL WEEKLY
Qty: 12 CAPSULE | Refills: 3 | Status: SHIPPED | OUTPATIENT
Start: 2020-12-02 | End: 2021-03-24

## 2020-12-02 RX ADMIN — CYANOCOBALAMIN 1000 MCG: 1000 INJECTION, SOLUTION INTRAMUSCULAR; SUBCUTANEOUS at 11:22

## 2020-12-02 NOTE — PROGRESS NOTES
Subjective   Sabiha [place]wood is a 89 y.o. female.   Chief Complaint   Patient presents with   • Altered Mental Status     Forgetfullness has worsened, contant reminding needed, nothing harmful to others or self.       Patient is present with her daughter.  She lives in [place] in a condominium now.  Her daughter is watching after her medications however there is some disagreement between the patient and her daughter on exactly how to manage those medications.  The patient insists on going back to the bottles but apparently they are not being taken as prescribed.  We talked about various options the daughter suggested [facility] pharmacy doing prepackaged meds I think that is a reasonable alternative.       The following portions of the patient's history were reviewed and updated as appropriate: allergies, current medications, past family history, past medical history, past social history, past surgical history and problem list.    Review of Systems   Unable to perform ROS: Dementia       Objective   Past Medical History:   Diagnosis Date   • Acute renal failure syndrome (CMS/HCC)    • Arrhythmia    • Diverticulitis    • Hiatal hernia    • Hypertension    • UTI (urinary tract infection)    • Vertigo       Past Surgical History:   Procedure Laterality Date   • CHOLECYSTECTOMY     • COLONOSCOPY N/A 5/10/2019    Procedure: COLONOSCOPY WITH ANESTHESIA;  Surgeon: Steven Bassett MD;  Location: [facility] ENDOSCOPY;  Service: Gastroenterology   • ENDOSCOPY N/A 5/10/2019    Procedure: ESOPHAGOGASTRODUODENOSCOPY WITH ANESTHESIA;  Surgeon: Steven Bassett MD;  Location: [facility] ENDOSCOPY;  Service: Gastroenterology   • HYSTERECTOMY          Current Outpatient Medications:   •  Calcium Carbonate-Vitamin D (CALTRATE 600+D PO), Take 1 tablet by mouth Daily., Disp: , Rfl:   •  citalopram (CeleXA) 10 MG tablet, Take 1 tablet by mouth Daily., Disp: 30 tablet, Rfl: 11  •  CloNIDine (CATAPRES) 0.1 MG tablet, Take 0.1 mg  by mouth 2 (Two) Times a Day., Disp: , Rfl:   •  diltiaZEM CD (CARDIZEM CD) 240 MG 24 hr capsule, Take 240 mg by mouth Daily., Disp: , Rfl:   •  LORazepam (ATIVAN) 0.5 MG tablet, Take 1 tablet by mouth Every 8 (Eight) Hours As Needed for Anxiety., Disp: 30 tablet, Rfl: 5  •  Multiple Vitamins-Minerals (MULTIPLE VITAMINS/WOMENS PO), Take 1 tablet by mouth Daily., Disp: , Rfl:   •  pantoprazole (PROTONIX) 40 MG EC tablet, Take 40 mg by mouth Daily., Disp: , Rfl:   •  potassium chloride (K-DUR,KLOR-CON) 10 MEQ CR tablet, Take 1 tablet by mouth Daily., Disp: , Rfl:   •  vitamin D (ERGOCALCIFEROL) 1.25 MG (71312 UT) capsule capsule, Take 1 capsule by mouth 1 (One) Time Per Week., Disp: , Rfl:   •  aspirin 81 MG EC tablet, Take 81 mg by mouth Daily., Disp: , Rfl:   •  chlorthalidone (HYGROTON) 25 MG tablet, Take 25 mg by mouth 2 (Two) Times a Day., Disp: , Rfl:   •  estradiol (ESTRACE) 1 MG tablet, Take 1 tablet by mouth Daily., Disp: , Rfl:   •  fluticasone (FLONASE) 50 MCG/ACT nasal spray, 1 spray into the nostril(s) as directed by provider., Disp: , Rfl:   •  isosorbide mononitrate (IMDUR) 30 MG 24 hr tablet, Take 1 tablet by mouth Daily., Disp: , Rfl:   •  megestrol (MEGACE ES) 625 MG/5ML suspension, Take 5 mL by mouth Daily., Disp: , Rfl:   •  nitroglycerin (Nitrostat) 0.4 MG SL tablet, Place  under the tongue., Disp: , Rfl:   •  spironolactone (ALDACTONE) 25 MG tablet, Take 25 mg by mouth 2 (Two) Times a Day., Disp: , Rfl:     Current Facility-Administered Medications:   •  cyanocobalamin injection 1,000 mcg, 1,000 mcg, Intramuscular, Q28 Days, Loc Sinclair MD, 1,000 mcg at 12/02/20 1122     Vitals:    12/02/20 1115   BP: 160/92   Pulse: 86   Temp: 97.1 °F (36.2 °C)   SpO2: 96%         12/02/20  1115   Weight: 47.6 kg (105 lb)     Patient's Body mass index is 18.6 kg/m². BMI is .      Physical Exam  Constitutional:       Appearance: Normal appearance. She is well-developed.   HENT:      Head: Normocephalic and  atraumatic.      Right Ear: External ear normal.      Left Ear: External ear normal.   Eyes:      Extraocular Movements: Extraocular movements intact.      Conjunctiva/sclera: Conjunctivae normal.      Pupils: Pupils are equal, round, and reactive to light.   Neck:      Musculoskeletal: Normal range of motion and neck supple. No neck rigidity.      Vascular: No carotid bruit.   Cardiovascular:      Rate and Rhythm: Normal rate and regular rhythm.      Pulses: Normal pulses.      Heart sounds: Normal heart sounds. No murmur. No gallop.    Pulmonary:      Effort: Pulmonary effort is normal.      Breath sounds: Normal breath sounds.   Musculoskeletal: Normal range of motion.         General: No swelling or tenderness.   Skin:     General: Skin is warm and dry.   Neurological:      General: No focal deficit present.      Mental Status: She is alert.   Psychiatric:         Mood and Affect: Mood normal.         Behavior: Behavior normal.               Assessment/Plan   Diagnoses and all orders for this visit:    1. Need for immunization against influenza (Primary)  -     Fluad Quad >65 years    2. Arteriosclerosis of both carotid arteries    3. Essential hypertension    4. Mixed hyperlipidemia    5. Vascular dementia without behavioral disturbance (CMS/HCC)    6. Vitamin B12 deficient megaloblastic anemia    Other orders  -     vitamin D (ERGOCALCIFEROL) 1.25 MG (24261 UT) capsule capsule; Take 1 capsule by mouth 1 (One) Time Per Week.  Dispense: 5 capsule        At this point I have given patient's daughter a list of the current medications that we have agreed that she is actually taking.  She has not been getting her B12 injections on a monthly basis I encouraged the daughter to bring her by once a month and get her B12 shots.  We are going to ask [facility] to prepackaged her medications and a daily planner.  We will see her back in 3 months for follow-up.  Patient had refused to take Megace to increase her  appetite daughter is   taking a very active role in watching her diet and medications at this point.

## 2020-12-02 NOTE — TELEPHONE ENCOUNTER
Called [facility] pharmacy and talked with Swapnil, to get her set up with pill packs.  Instructed to leave Celexa and Lorazepam out of the pill packs, as dgt has those and manages the administration of those medications and is going to continue to do so.

## 2020-12-03 LAB
BUN SERPL-MCNC: 11 MG/DL (ref 8–23)
BUN/CREAT SERPL: 10.9 (ref 7–25)
CALCIUM SERPL-MCNC: 9.3 MG/DL (ref 8.2–9.6)
CHLORIDE SERPL-SCNC: 98 MMOL/L (ref 98–107)
CO2 SERPL-SCNC: 31.6 MMOL/L (ref 22–29)
CREAT SERPL-MCNC: 1.01 MG/DL (ref 0.57–1)
GLUCOSE SERPL-MCNC: 85 MG/DL (ref 65–99)
POTASSIUM SERPL-SCNC: 4.5 MMOL/L (ref 3.5–5.2)
SODIUM SERPL-SCNC: 140 MMOL/L (ref 136–145)

## 2021-01-11 ENCOUNTER — TELEPHONE (OUTPATIENT)
Dept: INTERNAL MEDICINE | Facility: CLINIC | Age: 86
End: 2021-01-11

## 2021-01-11 NOTE — TELEPHONE ENCOUNTER
PATIENTS DAUGHTER CALLING IN REGARDS TO WANTING TO GET PATIENT SCHEDULED FOR A B12 SHOT AND WANTS TO KNOW WHEN HER LAST ONE WAS.    MICHELET WOULD LIKE A CALLBACK TO GET THIS SCHEDULED.    MICHELET CALLBACK # [phone]

## 2021-01-12 NOTE — TELEPHONE ENCOUNTER
Patient's daughter was called back. Alix stated that the patient over the last couple of days has become more confused, disoriented and inconsolable. Patient had also called the police to her home today stating that people had broken into her home and that her car was stolen. Daughter would like to know if patient is needing to go to the ER for evaluation for a possible UTI. Please advise.

## 2021-01-13 ENCOUNTER — HOSPITAL ENCOUNTER (INPATIENT)
Age: 86
LOS: 3 days | Discharge: HOME | DRG: 885 | End: 2021-01-16
Attending: EMERGENCY MEDICINE | Admitting: PSYCHIATRY & NEUROLOGY
Payer: MEDICARE

## 2021-01-13 ENCOUNTER — APPOINTMENT (OUTPATIENT)
Dept: CT IMAGING | Age: 86
DRG: 885 | End: 2021-01-13
Payer: MEDICARE

## 2021-01-13 DIAGNOSIS — F22 PARANOIA (HCC): ICD-10-CM

## 2021-01-13 DIAGNOSIS — R44.3 HALLUCINATIONS: Primary | ICD-10-CM

## 2021-01-13 LAB
ALBUMIN SERPL-MCNC: 4.2 G/DL (ref 3.5–5.2)
ALP BLD-CCNC: 46 U/L (ref 35–104)
ALT SERPL-CCNC: 11 U/L (ref 5–33)
AMPHETAMINE SCREEN, URINE: NEGATIVE
ANION GAP SERPL CALCULATED.3IONS-SCNC: 8 MMOL/L (ref 7–19)
AST SERPL-CCNC: 19 U/L (ref 5–32)
BACTERIA: NEGATIVE /HPF
BARBITURATE SCREEN URINE: NEGATIVE
BASOPHILS ABSOLUTE: 0 K/UL (ref 0–0.2)
BASOPHILS RELATIVE PERCENT: 0.3 % (ref 0–1)
BENZODIAZEPINE SCREEN, URINE: NEGATIVE
BILIRUB SERPL-MCNC: 0.3 MG/DL (ref 0.2–1.2)
BILIRUBIN URINE: NEGATIVE
BLOOD, URINE: ABNORMAL
BUN BLDV-MCNC: 25 MG/DL (ref 8–23)
CALCIUM SERPL-MCNC: 9.7 MG/DL (ref 8.2–9.6)
CANNABINOID SCREEN URINE: NEGATIVE
CHLORIDE BLD-SCNC: 102 MMOL/L (ref 98–111)
CLARITY: CLEAR
CO2: 29 MMOL/L (ref 22–29)
COCAINE METABOLITE SCREEN URINE: NEGATIVE
COLOR: YELLOW
CREAT SERPL-MCNC: 1.1 MG/DL (ref 0.5–0.9)
CRYSTALS, UA: ABNORMAL /HPF
EOSINOPHILS ABSOLUTE: 0.1 K/UL (ref 0–0.6)
EOSINOPHILS RELATIVE PERCENT: 1 % (ref 0–5)
EPITHELIAL CELLS, UA: 8 /HPF (ref 0–5)
ETHANOL: <10 MG/DL (ref 0–0.08)
GFR AFRICAN AMERICAN: 56
GFR NON-AFRICAN AMERICAN: 46
GLUCOSE BLD-MCNC: 147 MG/DL (ref 74–109)
GLUCOSE URINE: NEGATIVE MG/DL
HCT VFR BLD CALC: 36.6 % (ref 37–47)
HEMOGLOBIN: 12.1 G/DL (ref 12–16)
HYALINE CASTS: 5 /HPF (ref 0–8)
IMMATURE GRANULOCYTES #: 0 K/UL
KETONES, URINE: NEGATIVE MG/DL
LEUKOCYTE ESTERASE, URINE: ABNORMAL
LYMPHOCYTES ABSOLUTE: 1.2 K/UL (ref 1.1–4.5)
LYMPHOCYTES RELATIVE PERCENT: 18.3 % (ref 20–40)
Lab: NORMAL
MCH RBC QN AUTO: 31.5 PG (ref 27–31)
MCHC RBC AUTO-ENTMCNC: 33.1 G/DL (ref 33–37)
MCV RBC AUTO: 95.3 FL (ref 81–99)
MONOCYTES ABSOLUTE: 0.6 K/UL (ref 0–0.9)
MONOCYTES RELATIVE PERCENT: 8.6 % (ref 0–10)
NEUTROPHILS ABSOLUTE: 4.8 K/UL (ref 1.5–7.5)
NEUTROPHILS RELATIVE PERCENT: 71.5 % (ref 50–65)
NITRITE, URINE: NEGATIVE
OPIATE SCREEN URINE: NEGATIVE
PDW BLD-RTO: 13.1 % (ref 11.5–14.5)
PH UA: 6 (ref 5–8)
PLATELET # BLD: 182 K/UL (ref 130–400)
PMV BLD AUTO: 10 FL (ref 9.4–12.3)
POTASSIUM SERPL-SCNC: 4 MMOL/L (ref 3.5–5)
PROTEIN UA: 30 MG/DL
RBC # BLD: 3.84 M/UL (ref 4.2–5.4)
RBC UA: 4 /HPF (ref 0–4)
SARS-COV-2, NAAT: NOT DETECTED
SODIUM BLD-SCNC: 139 MMOL/L (ref 136–145)
SPECIFIC GRAVITY UA: 1.02 (ref 1–1.03)
TOTAL PROTEIN: 7 G/DL (ref 6.6–8.7)
UROBILINOGEN, URINE: 0.2 E.U./DL
WBC # BLD: 6.8 K/UL (ref 4.8–10.8)
WBC UA: 9 /HPF (ref 0–5)

## 2021-01-13 PROCEDURE — U0002 COVID-19 LAB TEST NON-CDC: HCPCS

## 2021-01-13 PROCEDURE — 87086 URINE CULTURE/COLONY COUNT: CPT

## 2021-01-13 PROCEDURE — 85025 COMPLETE CBC W/AUTO DIFF WBC: CPT

## 2021-01-13 PROCEDURE — 2580000003 HC RX 258: Performed by: EMERGENCY MEDICINE

## 2021-01-13 PROCEDURE — 36415 COLL VENOUS BLD VENIPUNCTURE: CPT

## 2021-01-13 PROCEDURE — 6370000000 HC RX 637 (ALT 250 FOR IP): Performed by: PSYCHIATRY & NEUROLOGY

## 2021-01-13 PROCEDURE — 70450 CT HEAD/BRAIN W/O DYE: CPT

## 2021-01-13 PROCEDURE — 82077 ASSAY SPEC XCP UR&BREATH IA: CPT

## 2021-01-13 PROCEDURE — 80307 DRUG TEST PRSMV CHEM ANLYZR: CPT

## 2021-01-13 PROCEDURE — 99283 EMERGENCY DEPT VISIT LOW MDM: CPT

## 2021-01-13 PROCEDURE — 80053 COMPREHEN METABOLIC PANEL: CPT

## 2021-01-13 PROCEDURE — 1240000000 HC EMOTIONAL WELLNESS R&B

## 2021-01-13 PROCEDURE — 81001 URINALYSIS AUTO W/SCOPE: CPT

## 2021-01-13 RX ORDER — CLONIDINE HYDROCHLORIDE 0.1 MG/1
0.1 TABLET ORAL 2 TIMES DAILY
Status: DISCONTINUED | OUTPATIENT
Start: 2021-01-13 | End: 2021-01-16

## 2021-01-13 RX ORDER — DILTIAZEM HYDROCHLORIDE 240 MG/1
240 CAPSULE, COATED, EXTENDED RELEASE ORAL DAILY
Status: DISCONTINUED | OUTPATIENT
Start: 2021-01-14 | End: 2021-01-16 | Stop reason: HOSPADM

## 2021-01-13 RX ORDER — LORAZEPAM 0.5 MG/1
0.5 TABLET ORAL EVERY 8 HOURS PRN
Status: ON HOLD | COMMUNITY
End: 2021-01-16 | Stop reason: HOSPADM

## 2021-01-13 RX ORDER — ERGOCALCIFEROL 1.25 MG/1
50000 CAPSULE ORAL WEEKLY
COMMUNITY

## 2021-01-13 RX ORDER — ACETAMINOPHEN 325 MG/1
650 TABLET ORAL EVERY 4 HOURS PRN
Status: DISCONTINUED | OUTPATIENT
Start: 2021-01-13 | End: 2021-01-16 | Stop reason: HOSPADM

## 2021-01-13 RX ORDER — 0.9 % SODIUM CHLORIDE 0.9 %
1000 INTRAVENOUS SOLUTION INTRAVENOUS ONCE
Status: COMPLETED | OUTPATIENT
Start: 2021-01-13 | End: 2021-01-13

## 2021-01-13 RX ORDER — POLYETHYLENE GLYCOL 3350 17 G/17G
17 POWDER, FOR SOLUTION ORAL DAILY PRN
Status: DISCONTINUED | OUTPATIENT
Start: 2021-01-13 | End: 2021-01-16 | Stop reason: HOSPADM

## 2021-01-13 RX ORDER — PANTOPRAZOLE SODIUM 40 MG/1
40 TABLET, DELAYED RELEASE ORAL
Status: DISCONTINUED | OUTPATIENT
Start: 2021-01-14 | End: 2021-01-16 | Stop reason: HOSPADM

## 2021-01-13 RX ORDER — MECOBALAMIN 5000 MCG
5 TABLET,DISINTEGRATING ORAL NIGHTLY
Status: DISCONTINUED | OUTPATIENT
Start: 2021-01-13 | End: 2021-01-16 | Stop reason: HOSPADM

## 2021-01-13 RX ADMIN — Medication 5 MG: at 21:14

## 2021-01-13 RX ADMIN — SODIUM CHLORIDE 1000 ML: 9 INJECTION, SOLUTION INTRAVENOUS at 12:54

## 2021-01-13 RX ADMIN — CLONIDINE HYDROCHLORIDE 0.1 MG: 0.1 TABLET ORAL at 21:14

## 2021-01-13 ASSESSMENT — ENCOUNTER SYMPTOMS
VOMITING: 0
COUGH: 0
ABDOMINAL PAIN: 0
SHORTNESS OF BREATH: 0
RHINORRHEA: 0
DIARRHEA: 0
SORE THROAT: 0
BACK PAIN: 0
NAUSEA: 0

## 2021-01-13 ASSESSMENT — SLEEP AND FATIGUE QUESTIONNAIRES
DO YOU USE A SLEEP AID: NO
AVERAGE NUMBER OF SLEEP HOURS: 8
DO YOU HAVE DIFFICULTY SLEEPING: NO

## 2021-01-13 NOTE — ED PROVIDER NOTES
[facility]  eMERGENCY dEPARTMENT eNCOUnter      Pt Name: Blanche Aceves  MRN: [id number]  Jasongfcl 4/25/1927  Date of evaluation: 1/13/2021  Provider: Maame Pham MD    CHIEF COMPLAINT       Chief Complaint   Patient presents with    Altered Mental Status     states \"everything spinning\" disoriented states \"guitar strings\" for lights. \"Kept seeing a man moving across my eyes\"          HISTORY OF PRESENT ILLNESS   (Location/Symptom, Timing/Onset,Context/Setting, Quality, Duration, Modifying Factors, Severity)  Note limiting factors. Blanche Aceves is a 80 y.o. female who presents to the emergency department for hallucinations. The patient reports that for the past several days that she has been having hallucinations but understands that this is not real overall. She has seen 3 girls in her room and has also seen a man looking through the vent above her bed. She had her grandson come and go into the attic to make sure it was not real.  The 3 girls once told her they had stole her car. She admits she has had increased forgetfulness recently. Denies any UTI symptoms but admits she has had \"bladder issues\" for the past several months and this has caused her to miss some [employer]. Triage note discussed with patient and she denies spinning but states when walking her vision seems \"shaky\". Denies any focal neuro symptoms. She is aox3 for me. HPI    NursingNotes were reviewed. REVIEW OF SYSTEMS    (2-9 systems for level 4, 10 or more for level 5)     Review of Systems   Constitutional: Negative for chills and fever. HENT: Negative for rhinorrhea and sore throat. Respiratory: Negative for cough and shortness of breath. Cardiovascular: Negative for chest pain and leg swelling. Gastrointestinal: Negative for abdominal pain, diarrhea, nausea and vomiting. Genitourinary: Negative for dysuria, frequency and urgency. Musculoskeletal: Negative for back pain and neck pain. Neurological: Negative for dizziness and headaches. Psychiatric/Behavioral: Positive for hallucinations. Negative for dysphoric mood and suicidal ideas. All other systems reviewed and are negative. PAST MEDICALHISTORY     Past Medical History:   Diagnosis Date    Heart palpitations     Hypertension          SURGICAL HISTORY       Past Surgical History:   Procedure Laterality Date    CAROTID ENDARTERECTOMY      CHOLECYSTECTOMY      HYSTERECTOMY      KIDNEY STONE SURGERY           CURRENT MEDICATIONS     Previous Medications    CITALOPRAM (CELEXA) 10 MG TABLET    Take 10 mg by mouth daily    CLONIDINE (CATAPRES) 0.1 MG TABLET    Take 0.1 mg by mouth 2 times daily    DILTIAZEM (DILACOR XR) 240 MG EXTENDED RELEASE CAPSULE    Take 240 mg by mouth daily    ESTRADIOL (ESTRACE) 1 MG TABLET    Take 1 mg by mouth daily    MULTIPLE VITAMINS-MINERALS (THERAPEUTIC MULTIVITAMIN-MINERALS) TABLET    Take 1 tablet by mouth daily    PANTOPRAZOLE SODIUM (PROTONIX) 40 MG PACK PACKET    Take 40 mg by mouth every morning (before breakfast)    POTASSIUM CHLORIDE (MICRO-K) 10 MEQ EXTENDED RELEASE CAPSULE    Take 10 mEq by mouth 2 times daily       ALLERGIES     Codeine    FAMILY HISTORY       Family History   Problem Relation Age of Onset    Stroke Mother         CAD, palpitations,  12          SOCIAL HISTORY       Social History     Socioeconomic History    Marital status:      Spouse name: Not on file    Number of children: Not on file    Years of education: Not on file    Highest education level: Not on file   Occupational History    Not on file   Social Needs    Financial resource strain: Not on file    Food insecurity     Worry: Not on file     Inability: Not on file    Transportation needs     Medical: Not on file     Non-medical: Not on file   Tobacco Use    Smoking status: Never Smoker    Smokeless tobacco: Never Used   Substance and Sexual Activity    Alcohol use:  No Alcohol/week: 0.0 standard drinks    Drug use: No    Sexual activity: Not on file   Lifestyle    Physical activity     Days per week: Not on file     Minutes per session: Not on file    Stress: Not on file   Relationships    Social connections     Talks on phone: Not on file     Gets together: Not on file     Attends Druze service: Not on file     Active member of club or organization: Not on file     Attends meetings of clubs or organizations: Not on file     Relationship status: Not on file    Intimate partner violence     Fear of current or ex partner: Not on file     Emotionally abused: Not on file     Physically abused: Not on file     Forced sexual activity: Not on file   Other Topics Concern    Not on file   Social History Narrative    Not on file       SCREENINGS             PHYSICAL EXAM    (up to 7 for level 4, 8 or more for level 5)     ED Triage Vitals   BP Temp Temp Source Pulse Resp SpO2 Height Weight   01/13/21 1123 01/13/21 1121 01/13/21 1121 01/13/21 1123 01/13/21 1123 01/13/21 1123 -- --   133/60 97.7 °F (36.5 °C) Temporal 84 16 95 %         Physical Exam  Vitals signs and nursing note reviewed. Constitutional:       General: She is not in acute distress. Appearance: Normal appearance. She is well-developed. She is not diaphoretic. HENT:      Head: Normocephalic and atraumatic. Right Ear: External ear normal.      Left Ear: External ear normal.   Eyes:      Extraocular Movements: Extraocular movements intact. Conjunctiva/sclera: Conjunctivae normal.      Pupils: Pupils are equal, round, and reactive to light. Neck:      Musculoskeletal: Normal range of motion. Trachea: No tracheal deviation. Cardiovascular:      Rate and Rhythm: Normal rate and regular rhythm. Pulses: Normal pulses. Heart sounds: Normal heart sounds. No murmur. Pulmonary:      Effort: Pulmonary effort is normal. No respiratory distress. Breath sounds: Normal breath sounds. No wheezing or rales. Abdominal:      Palpations: Abdomen is soft. There is no mass. Tenderness: There is no abdominal tenderness. Musculoskeletal: Normal range of motion. Skin:     General: Skin is warm and dry. Neurological:      General: No focal deficit present. Mental Status: She is alert and oriented to person, place, and time. GCS: GCS eye subscore is 4. GCS verbal subscore is 5. GCS motor subscore is 6. Cranial Nerves: No cranial nerve deficit, dysarthria or facial asymmetry. Sensory: No sensory deficit. Motor: No weakness. Coordination: Coordination normal.         DIAGNOSTIC RESULTS       RADIOLOGY:  Non-plain film images such as CT, Ultrasound and MRI are read by the radiologist. Plain radiographic images are visualized and preliminarily interpreted bythe emergency physician with the below findings:      [address]   Final Result   Mild cerebral and cerebellar volume loss with chronic microvascular   disease but no evidence of acute intracranial process.    Signed by Dr Irene Allred on 1/13/2021 12:11 PM              LABS:  Labs Reviewed   CBC WITH AUTO DIFFERENTIAL - Abnormal; Notable for the following components:       Result Value    RBC 3.84 (*)     Hematocrit 36.6 (*)     MCH 31.5 (*)     Neutrophils % 71.5 (*)     Lymphocytes % 18.3 (*)     All other components within normal limits   COMPREHENSIVE METABOLIC PANEL - Abnormal; Notable for the following components:    Glucose 147 (*)     BUN 25 (*)     CREATININE 1.1 (*)     GFR Non- 46 (*)     GFR  56 (*)     Calcium 9.7 (*)     All other components within normal limits   URINE RT REFLEX TO CULTURE - Abnormal; Notable for the following components:    Blood, Urine SMALL (*)     Protein, UA 30 (*)     Leukocyte Esterase, Urine MODERATE (*)     All other components within normal limits MICROSCOPIC URINALYSIS - Abnormal; Notable for the following components:    Bacteria, UA NEGATIVE (*)     Crystals, UA NEG (*)     WBC, UA 9 (*)     All other components within normal limits   CULTURE, URINE   ETHANOL   URINE DRUG SCREEN       All other labs were within normal range or not returned as of this dictation. EMERGENCY DEPARTMENT COURSE and DIFFERENTIAL DIAGNOSIS/MDM:   Vitals:    Vitals:    01/13/21 1121 01/13/21 1123 01/13/21 1320   BP:  133/60 (!) 144/72   Pulse:  84 88   Resp:  16 19   Temp: 97.7 °F (36.5 °C)     TempSrc: Temporal     SpO2:  95% 96%       MDM  Number of Diagnoses or Management Options     Amount and/or Complexity of Data Reviewed  Clinical lab tests: ordered and reviewed  Tests in the radiology section of CPT®: ordered and reviewed  Independent visualization of images, tracings, or specimens: yes      Daughter Abad Faria contact info, cell phone is [phone] and home number is [phone]    No obvious organic cause to explain the patient's hallucinations however in discussions with the daughter Lee Goldberg she states that she has had increasing forgetfulness over the past couple years and just recently she has developed the hallucinations which have been quite disruptive to the patient and her life. The daughter lives with her  who is wheelchair-bound and the patient lives alone in a small house. She has been calling the police scared that somebody has stolen her vehicle and has been out going to neighbors homes at night occasionally. She is also been quite paranoid and scared that several young girls where in her home and a man was in the attic. Will d/w Dr. Loretha Cheadle [phone]    Pt medically clear, D/w Dr. Loretha Cheadle who is agreeable to admit patient      CONSULTS:  None    PROCEDURES:  Unless otherwise noted below, none     Procedures    FINAL IMPRESSION      1. Hallucinations    2.  Paranoia ([facility] [address]          DISPOSITION/PLAN   DISPOSITION        PATIENT REFERRED TO: No follow-up provider specified.     DISCHARGE MEDICATIONS:  New Prescriptions    No medications on file          (Please note that portions of this note were completed with a voice recognition program.  Efforts were made to edit thedictations but occasionally words are mis-transcribed.)    Nancy Stiles MD (electronically signed)  Attending Emergency Physician        Sadie Del Rio MD  01/13/21 6000

## 2021-01-13 NOTE — TELEPHONE ENCOUNTER
PATIENTS DAUGHTER CALLED BACK AND WOULD LIKE A CALLBACK FROM PROVIDER. STATES IT IS GETTING WORSE EVERYDAY. SHE MENTIONED A POSSIBLE REFERRAL TO THE GERIATRIC UNIT AT [facility]. PATIENTS DAUGHTER WOULD LIKE A CALLBACK @ [phone]    STATES SHE IS VERY CONFUSED, DRASTIC CHANGE FROM WHAT HAS BEEN HAPPENING.

## 2021-01-13 NOTE — PROGRESS NOTES
SW placed a call to get collateral from patients daughter Sukhjinder Jacobs and left a message at [phone]

## 2021-01-13 NOTE — TELEPHONE ENCOUNTER
Dr. Sinclair reviewed message thread and says would be best to take her to the ER to be evaluated.  Dgt informed and voiced understanding.

## 2021-01-13 NOTE — PROGRESS NOTES
Collateral obtained from: Patients daughter [phone]    Immediate Stressors & Time Episode Began: Patent has been showing signs of dementia over the past few weeks and even thought that her mom and and  were in the home and they are both passed away. Patient has been very disoriented and thought that someone had stolen her car and had called the police. Patient had not driven her car in over 2 years. Patient asked her grandson to look up in her attic because she thought that someone was up in the attic  Patient lives alone in [facility]. Patient is currently under the care of Dr. Sen Rajput    Diagnosis/Hx of compliance with meds: Not taking medication    Tx Hx/Past hospitalizations:  First admission    Family hx of psychiatric issues: No issues    Substance Abuse: No issues    Pending Legal: No issues    Safety Issues (Weapons? Hx of attempts): Support system/Medication Managed by:  The importance of medication management and locking extra medication in a secured location was explained and reccommended to collateral.     Additional Info: Patient lives alone but daughter checks on her everyday

## 2021-01-13 NOTE — PROGRESS NOTES
[facility] Admission from ED  Nursing Admission Note        Reason for Admission: Bharath De La Fuente is a 80 y.o. female admitted to behavoiral health after experiencing hallucinations. \"I've been seeing a hand in the vent above my couch. I had my grandson look through the attic but he didn't find anyone. I also had seen my neighbor with three girls that I thought were exchange students and were wandering around my place but apparently no one is staying with my neighbor. \" She has been paranoid that someone is listening to her conversations and watching her. Patient Active Problem List   Diagnosis    Heart palpitations    Mitral valve insufficiency    Aortic valve insufficiency    Essential hypertension    Diverticulitis    Gastroesophageal reflux disease without esophagitis    Acute cystitis without hematuria    Acute renal failure (HCC)    Severe malnutrition (HCC)    CKD (chronic kidney disease) stage 3, GFR 30-59 ml/min         Addictive Behavior:        Medical Problems:   Past Medical History:   Diagnosis Date    Heart palpitations     Hypertension        Status EXAM:  Status and Exam  Facial Expression: Avoids Gaze  Affect: Congruent  Level of Consciousness: Alert  Mood:Normal: Yes  Motor Activity:Normal: Yes  Interview Behavior: Cooperative  Preception: Joiner to Person, Joiner to Time, Joiner to Place, Joiner to Situation  Attention:Normal: Yes  Thought Content:Normal: No  Thought Content: Paranoia  Hallucinations: None(Denies at this time, was experiencing visual hallucination of a hand and seeing people.  Denies auditory)  Delusions: No  Memory:Normal: No  Memory: Poor Recent, Poor Remote  Insight and Judgment: No  Insight and Judgment: Poor Insight, Poor Judgment  Present Suicidal Ideation: No  Present Homicidal Ideation: No      Metabolic Screening:    No results found for: LABA1C  No results found for: CHOL  No results found for: TRIG  No results found for: HDL  No components found for: LDLCAL No results found for: LABVLDL    Body mass index is 18.46 kg/m². BP Readings from Last 2 Encounters:   01/13/21 (!) 172/75   09/07/20 (!) 161/43       PATIENT STRENGTHS:  Strengths: Communication, Social Skills    Patient Strengths and Limitations:         Tobacco Screening:  Practical Counseling, on admission, zonia X, if applicable and completed (first 3 are required if patient doesn't refuse):            ( )  Recognizing danger situations (included triggers and roadblocks)                    ( )  Coping skills (new ways to manage stress, exercise, relaxation techniques, changing routine, distraction)                                                           ( )  Basic information about quitting (benefits of quitting, techniques in how to quit, available resources  ( ) [facility]                                           ( ) Patient refused counseling  ( x) Patient has not smoked in the last 30 days        Admission to Unit:    Pt admitted to [facility] under the care of Dr. Richard Hanson,  arrived on unit via [facility] with security and staff from ED    Patient arrived dressed in paper scrubs:  NO.    Body assessment and safety check completed by Bernice Rico Rn and  NO contraband discovered. Patient belongings and valuables was cataloged and accounted for by Davy Paez RN. Admission completed by Bernice Rico RN  Oriented to unit, unit policy and expectations:  YES    Reviewed and explained all legal documents:  Yes   Education for Fall Prevention and Restraints given: Yes   Patient signed all legal documents Yes  Pt verbalizes understanding:YEs    Deborah Mcqueen? Yes    Identifies stressors. Yes.    COVID TEACHING: Nursing provided education regarding COVID for social distancing, wearing masks, washing hands, and reporting any symptoms: Yes  Mask Provided: Yes If patient refused, reason: NA      Admission Note: Pt came to the emergency room after experiencing hallucinations and paranoia. Pt reported, \"I got dizzy one night while I was sitting on the couch. When I looked up above me in the vent to the attic I saw a hand. I called my grandson and asked him if he would come over the house and search the attic, but I told him not to say anything about the hand I saw, I told him that we'd say I left a shirt up there because I didn't want whoever was up there to know he was coming to look for them. But he didn't find anyone. And then my neighbor came over to stay with me because my  had to go out of town and I don't like staying alone. I had seen her earlier with three girls who were exchange students. When I asked her about the girls and if she thought I should be concerned about them in my yard, she said she didn't have anyone staying with her. \" Pt denies any auditory hallucinations. Pt denies any previous psychiatric illness. Pt denies homicidal or suicidal ideations. Pt is alert to name, place, situation and was oriented on month and year but uncertain of the day. She is calm and cooperative. Pt ambulates with stable, steady gait. No distress noted. Will continue to monitor.           Electronically signed by Bernice Rico RN on 1/13/21 at 5:07 PM CST

## 2021-01-13 NOTE — PROGRESS NOTES
Admission Note      Reason for admission/Target Symptom: Patient admitted to [facility] due to female who presents to the emergency department for hallucinations. The patient reports that for the past several days that she has been having hallucinations but understands that this is not real overall. She has seen 3 girls in her room and has also seen a man looking through the vent above her bed. She had her grandson come and go into the attic to make sure it was not real.  The 3 girls once told her they had stole her car. She admits she has had increased forgetfulness   Diagnoses: Depression NOS  UDS: Neg  BAL:  Neg    SW met with treatment team to discuss patient's treatment including care planning, discharge planning, and follow-up needs. Pt has been admitted to [facility]. Treatment team has identified patient's discharge needs as medication management and outpatient therapy/counseling. Pt confirmed  the need for ongoing treatment post inpatient stay. Pt was also provided a handout of contact information for drug and alcohol treatment centers and other community support service such as [facility], AA, and [facility].

## 2021-01-14 PROBLEM — F29 PSYCHOSIS (HCC): Status: ACTIVE | Noted: 2021-01-14

## 2021-01-14 PROBLEM — R44.3 HALLUCINATIONS: Status: ACTIVE | Noted: 2021-01-14

## 2021-01-14 LAB
CHOLESTEROL, TOTAL: 183 MG/DL (ref 160–199)
HBA1C MFR BLD: 5.3 % (ref 4–6)
HDLC SERPL-MCNC: 66 MG/DL (ref 65–121)
LDL CHOLESTEROL CALCULATED: 104 MG/DL
TRIGL SERPL-MCNC: 65 MG/DL (ref 0–149)
TSH REFLEX FT4: 1.34 UIU/ML (ref 0.35–5.5)
VITAMIN B-12: 347 PG/ML (ref 211–946)
VITAMIN D 25-HYDROXY: 54.2 NG/ML

## 2021-01-14 PROCEDURE — 84443 ASSAY THYROID STIM HORMONE: CPT

## 2021-01-14 PROCEDURE — 82607 VITAMIN B-12: CPT

## 2021-01-14 PROCEDURE — 6370000000 HC RX 637 (ALT 250 FOR IP): Performed by: PSYCHIATRY & NEUROLOGY

## 2021-01-14 PROCEDURE — 99223 1ST HOSP IP/OBS HIGH 75: CPT | Performed by: PSYCHIATRY & NEUROLOGY

## 2021-01-14 PROCEDURE — 1240000000 HC EMOTIONAL WELLNESS R&B

## 2021-01-14 PROCEDURE — 80061 LIPID PANEL: CPT

## 2021-01-14 PROCEDURE — 36415 COLL VENOUS BLD VENIPUNCTURE: CPT

## 2021-01-14 PROCEDURE — 83036 HEMOGLOBIN GLYCOSYLATED A1C: CPT

## 2021-01-14 PROCEDURE — 82306 VITAMIN D 25 HYDROXY: CPT

## 2021-01-14 RX ORDER — HYDROXYZINE HYDROCHLORIDE 10 MG/1
10 TABLET, FILM COATED ORAL 3 TIMES DAILY PRN
Status: DISCONTINUED | OUTPATIENT
Start: 2021-01-14 | End: 2021-01-16 | Stop reason: HOSPADM

## 2021-01-14 RX ORDER — CHOLECALCIFEROL (VITAMIN D3) 125 MCG
500 CAPSULE ORAL DAILY
Status: DISCONTINUED | OUTPATIENT
Start: 2021-01-14 | End: 2021-01-16 | Stop reason: HOSPADM

## 2021-01-14 RX ADMIN — Medication 5 MG: at 21:06

## 2021-01-14 RX ADMIN — DILTIAZEM HYDROCHLORIDE 240 MG: 240 CAPSULE, COATED, EXTENDED RELEASE ORAL at 08:12

## 2021-01-14 RX ADMIN — CYANOCOBALAMIN TAB 500 MCG 500 MCG: 500 TAB at 13:36

## 2021-01-14 RX ADMIN — CLONIDINE HYDROCHLORIDE 0.1 MG: 0.1 TABLET ORAL at 08:12

## 2021-01-14 RX ADMIN — PANTOPRAZOLE SODIUM 40 MG: 40 TABLET, DELAYED RELEASE ORAL at 06:17

## 2021-01-14 RX ADMIN — CLONIDINE HYDROCHLORIDE 0.1 MG: 0.1 TABLET ORAL at 21:06

## 2021-01-14 RX ADMIN — HYDROXYZINE HYDROCHLORIDE 10 MG: 10 TABLET, FILM COATED ORAL at 21:06

## 2021-01-14 NOTE — FLOWSHEET NOTE
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 Expressed her struggles of current medical problems and concerned about her daughter who is taking care of the son-in-law. Life review. Stated she is not afraid of dying but worried about leaving her daughter along. Hoped to go home soon. Bedside prayer and provided a Bible through her nurse. Monty Sandhu was very appreciative to spiritual conversation.   Electronically signed by Cassandra Lam on 1/14/2021 at 3:59 PM

## 2021-01-14 NOTE — PROGRESS NOTES
I Daily Shift Assessment-Geriatric Unit  Nursing Progress Note          Room: [facility]21-[facility]   Name: Austin Holstein   Age: 80 y.o. Gender: female   Dx: <principal problem not specified>  Precautions: fall risk  Inpatient Status: voluntary     SLEEP:    Sleep: Yes,   Sleep Quality Good   Hours Slept: 7   Sleep Medications: Yes  PRN Sleep Meds: No       MEDICAL:      Other PRN Meds: No   Med Compliant: Yes   Accu-Chek: No   Oxygen/CPAP/BiPAP: No  CIWA/CINA: No   PAIN Assessment: none  Side Effects from medication: No    Is Patient experiencing any respiratory symptoms (headache, fever, body aches, cough. Curry Garcia ): no  Patient educated by nursing to practice social distancing, wear masks, wash hands frequently: yes      Metabolic Screening:    No results found for: LABA1C    No results found for: CHOL  No results found for: TRIG  No results found for: HDL  No components found for: LDLCAL  No results found for: LABVLDL      Body mass index is 18.46 kg/m². BP Readings from Last 2 Encounters:   01/13/21 139/64   09/07/20 (!) 161/43         PSYCH:     SI denies suicidal ideation    HI Negative for homicidal ideation        AVH:Absent      Depression: 5 Anxiety: 0       GENERAL:      Appetite: good  Social: No Speech: normal   Appearance:appropriately dressed and healthy looking  Assistive Devices: noneLevel of Assist: Independent      GROUP:    Group Participation: No  Participation LevelNone    Participation Jazz Covington just arrived to unit after 3 pm.    Notes:  Patient resting in bed at the beginning of the shift. Patient pleasant and cooperative with staff. Patient stated she was feeling better. Patient states, \" I don't know what is going on in my head, but I hope it is gone. \"  Patient not experiencing any respiratory problems and has voiced no complaints. Will continue to monitor for safety and any signs or symptoms of respiratory issues.       Dorina Caceres RN

## 2021-01-14 NOTE — PROGRESS NOTES
Pt rested well last night. Pt encouraged to get up for breakfast, and after pt returned to bed. Pt remains pleasant and calm. Pt did not remember why she was at the hospital and it was explained to her that she reported having hallucinations and some confusion. Pt agreed and denies any current hallucinations overnight at the hospital. Pt is very concerned about burdening her daughter and family and worries that she has put more stress on them by being at the hospital.  Reinforced family's support and encouragement toward the pt. Pt denies any depression and rated anxiety \"well it's low,\". Pt denies any suicidal thoughts. Pt is wearing mask while on the unit and is socially distant. No signs or symptoms of covid-19. No distress noted. Will continue to monitor.

## 2021-01-14 NOTE — PROGRESS NOTES
SW met with treatment team to discuss pt's progress and setbacks. SW 2 was present. Pt was admitted, voluntary, for altered mental status, reports visual hallucinations, seeing people who are not there, paranoid ideations, admits to increased forgetfulness recently, denies SI/HI. Since admission, pt reportedly was cooperative with admission process, alert/oriented x 4, slept well last night, with medications, appetite is good, isolative to self, independent with ADL's, compliant with medications, reports moderate depression, denies anxiety, denies SI/HI/AVH, continue current treatment plan.

## 2021-01-14 NOTE — H&P
Department of Psychiatry  Attending History and Physical        CHIEF COMPLAINT:  \"I feel nervous\"    History obtained from: patient, chart    HISTORY OF PRESENT ILLNESS:    80-year-old white female with history of depression, admitted for visual hallucinations and paranoia. Patient was reportedly seeing girls and a man at home. She was mildly paranoid in the ER. She reported incontinence and forgetfulness. UDS negative, BAL less than 10. Came with mild BRO and moderate LES per UA. CT head showed evidence of microvascular ischemia. Patient has been calm and cooperative on the unit. She is pleasant during the interview this morning. Appears young for her age. She is oriented to person. Knows she is at the hospital.  Unable to tell me the date. Knows [place] is president. States she started seeing people in her house several weeks ago. She denies auditory hallucinations. She understands that what she saw is not real.  She denies having problems with her vision. She wears glasses. There is no history of retinopathy or macular degeneration. Patient goes to an ophthalmologist on a regular basis. She denies depression and anxiety. \"I am a happy person. \"  States she usually takes vitamins and aspirin at home. She denies taking Ativan, however, per Elizabeth Denson, it was filled in December. Patient lives alone and performs ADLs. She still pays her bills. She initially tells me her  who was a surgeon is . Later, states he travels with basketball teams. States she feels confused. Reports poor sleep and appetite. She is spiritual and uses prayer as a coping skill. PSYCHIATRIC HISTORY:    Diagnoses: Denies  Suicide attempts/gestures: Denies   Prior hospitalizations: Denies   Medication trials: Celexa, Ativan  Mental health contact: None   Head trauma: Denies    SUBSTANCE USE HISTORY:  Denies alcohol and illicit drug use. Denies tobacco use.     Past Medical History: Past Medical History:   Diagnosis Date    Heart palpitations     Hypertension        Past Surgical History:    Past Surgical History:   Procedure Laterality Date    CAROTID ENDARTERECTOMY      CHOLECYSTECTOMY      HYSTERECTOMY      KIDNEY STONE SURGERY         Medications Prior to Admission:   Prior to Admission medications    Medication Sig Start Date End Date Taking? Authorizing Provider   vitamin D (ERGOCALCIFEROL) 1.25 MG (43135 UT) CAPS capsule Take 50,000 Units by mouth once a week   Yes Historical Provider, MD   LORazepam (ATIVAN) 0.5 MG tablet Take 0.5 mg by mouth every 8 hours as needed for Anxiety. Yes Historical Provider, MD   estradiol (ESTRACE) 1 MG tablet Take 1 mg by mouth daily   Yes Historical Provider, MD   potassium chloride (MICRO-K) 10 MEQ extended release capsule Take 10 mEq by mouth daily    Yes Historical Provider, MD   Multiple Vitamins-Minerals (THERAPEUTIC MULTIVITAMIN-MINERALS) tablet Take 1 tablet by mouth daily   Yes Historical Provider, MD   citalopram (CELEXA) 10 MG tablet Take 10 mg by mouth daily   Yes Historical Provider, MD   pantoprazole sodium (PROTONIX) 40 MG PACK packet Take 40 mg by mouth every morning (before breakfast)   Yes Historical Provider, MD   cloNIDine (CATAPRES) 0.1 MG tablet Take 0.1 mg by mouth 2 times daily   Yes Historical Provider, MD   diltiazem (DILACOR XR) 240 MG extended release capsule Take 240 mg by mouth daily   Yes Historical Provider, MD       Allergies:  Codeine    Social History:  Patient is  and lives alone. She has a daughter. Family History:   Family History   Problem Relation Age of Onset    Stroke Mother         CAD, palpitations,  12     No history of psychiatric illness or suicide attempts. REVIEW OF SYSTEMS:  General: No fevers, chills, night sweats, no recent weight loss or gain. Head: No headache, no migraine. Eyes: No recent visual changes. Ears: No recent hearing changes. Nose: No increased congestion or change in sense of smell. Throat: No sore throat, no trouble swallowing. Cardiovascular: No chest pain or palpitations, or dizziness. Respiratory: No cough, wheezes, congestion, or shortness of breath. Gastrointestinal: No abdominal pain, nausea or vomiting, no diarrhea or constipation. Musculo-skeletal: No edema, deformities, or loss of functions. Neurological: No loss of consciousness, abnormal sensations, or weakness. Skin: No rash, abrasions or bruises. PHYSICAL EXAM:  On observation  GENERAL APPEARANCE: Younger than stated age, thin, in NAD. HEAD: Normocephalic, atraumatic. CHEST: No deformities. MUSCULOSKELTAL: No obvious deformities, clubbing, cyanosis or edema. NEUROLOGICAL: Alert, oriented x 3, CN II-XII grossly intact. No abnormal movements or tremors. Gait stable. SKIN: Warm, dry, intact, no rash, abrasions, bruises. Vitals:  BP (!) 142/65   Pulse 80   Temp 98.4 °F (36.9 °C) (Temporal)   Resp 16   Ht 5' 3\" (1.6 m)   Wt 104 lb 3.2 oz (47.3 kg)   SpO2 94%   BMI 18.46 kg/m²     Mental Status Examination:    Appearance: Younger than stated age. Casually dressed. Gait stable. No abnormal movements or tremor. Behavior: Calm, cooperative. Speech: Normal in tone, volume, and quality. No slurring, dysarthria or pressured speech noted. Mood: \" I am tired \"   Affect: Mood congruent. Thought Process: Appears linear. Thought Content: Denies suicidal and homicidal ideation. No overt delusions or paranoia appreciated. Perceptions: Denies auditory or visual hallucinations at present time. Not responding to internal stimuli. Concentration: Intact. Orientation: to person, knows she is at the hospital   Language: Intact. Fund of information: Intact. Memory: Recent and remote appear intact. Neurovegitative: Poor appetite and sleep. Insight: Limited. Judgment: Limited.     DATA:  Lab Results   Component Value Date    WBC 6.8 01/13/2021 HGB 12.1 01/13/2021    HCT 36.6 (L) 01/13/2021    MCV 95.3 01/13/2021     01/13/2021     Lab Results   Component Value Date     01/13/2021    K 4.0 01/13/2021     01/13/2021    CO2 29 01/13/2021    BUN 25 (H) 01/13/2021    CREATININE 1.1 (H) 01/13/2021    GLUCOSE 147 (H) 01/13/2021    CALCIUM 9.7 (H) 01/13/2021    PROT 7.0 01/13/2021    LABALBU 4.2 01/13/2021    BILITOT 0.3 01/13/2021    ALKPHOS 46 01/13/2021    AST 19 01/13/2021    ALT 11 01/13/2021    LABGLOM 46 (A) 01/13/2021    GFRAA 56 (L) 01/13/2021       ASSESSMENT AND PLAN:  DSM-5 DIAGNOSIS:   Impression:  Psychosis unspecified  Rule out medication-induced psychosis  Rule out Darrin Ogles syndrome  Rule out dementia  BRO    Patient presents with limited insight and is meeting the criteria for inpatient psychiatric treatment. Plan:   -Admit to HI Unit and monitor on 15 minute checks. Suicide, fall and seizure precautions.  Jose Louis reviewed. -Gather collateral information from family with release.  -Medical monitoring to be performed by Dr. Sierra Toro and associates. Order routine labs. Monitor renal function. -Acclimate to the unit. Provide supportive psychotherapy. Spiritual services consult.  -Encourage participation in groups and therapeutic activities as appropriate. Work on coping skills. -Medications:    Stop Ativan and Celexa. Ativan could have contributed to confusion and perceptual disturbance. Melatonin for sleep.   PRN Atarax for episodic anxiety.    -The risks, benefits, side effects, indications, contraindications, and adverse effects of the medications have been discussed.  -The patient has verbalized understanding and has capacity to give informed consent.  -SW help evaluate home environment and provide outpatient resources.  -Discuss with treatment team.       Behavioral Services   Medicare Certification Upon Admission I certify that this patient's inpatient psychiatric hospital admission is medically necessary for:   X (1) Treatment which could reasonably be expected to improve this patient's condition,    (2) Or for diagnostic study;   AND   X (2) The inpatient psychiatric services are provided while the individual is under the care of a physician and are included in the individualized plan of care.    Estimated length of stay/service 3-5 days  Plan for post-hospital care TBA

## 2021-01-14 NOTE — PLAN OF CARE
Group Therapy Note    Date: 1/14/2021  Start Time: 1030  End Time:  1130  Number of Participants: 2    Type of Group: Psychoeducation    Wellness Binder Information  Module Name:  Men's Issues  Session Number:  1    Group Goal for Pt: To improve knowledge of effective stress management techniques    Notes:  Pt did not attend group discussion. Pt was invited/encouraged. Status After Intervention:      Participation Level:     Participation Quality:       Speech:         Thought Process/Content:       Affective Functioning:       Mood:       Level of consciousness:        Response to Learning:     Endings:     Modes of Intervention:       Discipline Responsible:       Signature:  Roberto Jimenez

## 2021-01-14 NOTE — PROGRESS NOTES
Requirement Note     SW met with pt to complete Psychosocial and CSSR-S on this date. Patients long and short term goals discussed. Pt voiced understanding. Treatment plan sheet signed. Pt verbalized understanding of the treatment plan. Pt participated in goals and objectives of the treatment plan. Pt completed safety plan with , pt received copy of plan, and original was placed into pt's chart. In the last 6 months has the pt been a danger to self: NO  In the last 6 months has the pt been a danger to others: NO  Legal Guardian/POA: NO     Provided pt with [employer] handout entitled \"Quitting Smoking. \"  Reviewed handout with pt addressing dangers of smoking, developing coping skills, and providing basic information about quitting. Patient received all components / Patient refused/declined practical counseling of tobacco practical counseling during the hospital stay.

## 2021-01-14 NOTE — PROGRESS NOTES
Pt encouraged out of bed for lunch. Pt sat in dining room and interacted with peer. Pt then went to return to bed. Offered craft activities, music, books or television but pt declined. \"I'm just so cold, I want to go lay back down. \" Charlotte heated for pt and pt assisted back to her room. No distress noted. Will continue to monitor.

## 2021-01-14 NOTE — PLAN OF CARE
Problem: Falls - Risk of:  Goal: Will remain free from falls  Description: Will remain free from falls  Outcome: Met This Shift  Goal: Absence of physical injury  Description: Absence of physical injury  Outcome: Met This Shift     Problem: Pain:  Goal: Pain level will decrease  Description: Pain level will decrease  Outcome: Ongoing  Goal: Control of acute pain  Description: Control of acute pain  Outcome: Ongoing  Goal: Control of chronic pain  Description: Control of chronic pain  Outcome: Ongoing     Problem: Mental Status - Impaired:  Goal: Mental status will improve  Description: Mental status will improve  Outcome: Ongoing     Problem: Anxiety:  Goal: Level of anxiety will decrease  Description: Level of anxiety will decrease  Outcome: Ongoing

## 2021-01-14 NOTE — PLAN OF CARE
Problem: Falls - Risk of:  Goal: Will remain free from falls  Description: Will remain free from falls  1/14/2021 0858 by Judi Groves RN  Outcome: Ongoing  1/14/2021 0108 by Nidia Christensen RN  Outcome: Met This Shift  Goal: Absence of physical injury  Description: Absence of physical injury  1/14/2021 0858 by Judi Groves RN  Outcome: Ongoing  1/14/2021 0108 by Niida Christensen RN  Outcome: Met This Shift     Problem: Pain:  Goal: Pain level will decrease  Description: Pain level will decrease  1/14/2021 0858 by Judi Groves RN  Outcome: Ongoing  1/14/2021 0108 by Nidia Christensen RN  Outcome: Ongoing  Goal: Control of acute pain  Description: Control of acute pain  1/14/2021 0858 by Judi Groves RN  Outcome: Ongoing  1/14/2021 0108 by Nidia Christensen RN  Outcome: Ongoing  Goal: Control of chronic pain  Description: Control of chronic pain  1/14/2021 0858 by Judi Groves RN  Outcome: Ongoing  1/14/2021 0108 by Nidia Christensen RN  Outcome: Ongoing     Problem: Mental Status - Impaired:  Goal: Mental status will improve  Description: Mental status will improve  1/14/2021 0858 by Judi Groves RN  Outcome: Ongoing  1/14/2021 0108 by Nidia Christensen RN  Outcome: Ongoing     Problem: Anxiety:  Goal: Level of anxiety will decrease  Description: Level of anxiety will decrease  1/14/2021 0858 by Judi Groves RN  Outcome: Ongoing  1/14/2021 0108 by Nidia Christensen RN  Outcome: Ongoing

## 2021-01-14 NOTE — PROGRESS NOTES
SAFETY PLAN    A suicide Safety Plan is a document that supports someone when they are having thoughts of suicide. Warning Signs that indicate a suicidal crisis may be developing: What (situations, thoughts, feelings, body sensations, behaviors, etc.) do you experience that lets you know you are beginning to think about suicide? 1. \"I haven't really been stressed\"      Internal Coping Strategies:  What things can I do (relaxation techniques, hobbies, physical activities, etc.) to take my mind off my problems without contacting another person? 1. Mu[employer] work  2. Keeping house  3. Organize    People and social settings that provide distraction: Who can I call or where can I go to distract me? 1. Name: Family    2. Name: friends    3. Place: [employer]              People whom I can ask for help: Who can I call when I need help - for example, friends, family, clergy, someone else? 1. Name: Pamla Nageotte                 2. Name: Sunday School        Professionals or [address] [place] [address] Blvd I can contact during a crisis: Who can I call for help - for example, my doctor, my psychiatrist, my psychologist, a mental health provider, a suicide hotline? 1. Clinician Name: [id number] [address]   Phone: [phone]      Clinician Pager or Emergency Contact #: [phone]    2. Clinician Name: [address]   Phone: [phone]      Clinician Pager or Emergency Contact #: [phone]    3. Suicide Prevention Lifeline: 4-280-482-TALK (9172)    4. [facility] : [address] Emergency Department      Emergency Services Address: [place] [address] [address] [facility]  Emergency Services Phone: [phone]    Making the environment safe: How can I make my environment (house/apartment/living space) safer? For example, can I remove guns, medications, and other items? 1. Remove all guns and weapons from the home. 2. Discard any extra medications in the home.

## 2021-01-15 ENCOUNTER — TELEPHONE (OUTPATIENT)
Dept: INTERNAL MEDICINE | Facility: CLINIC | Age: 86
End: 2021-01-15

## 2021-01-15 LAB
ANION GAP SERPL CALCULATED.3IONS-SCNC: 7 MMOL/L (ref 7–19)
BUN BLDV-MCNC: 26 MG/DL (ref 8–23)
CALCIUM SERPL-MCNC: 8.9 MG/DL (ref 8.2–9.6)
CHLORIDE BLD-SCNC: 102 MMOL/L (ref 98–111)
CO2: 28 MMOL/L (ref 22–29)
CREAT SERPL-MCNC: 1 MG/DL (ref 0.5–0.9)
GFR AFRICAN AMERICAN: >59
GFR NON-AFRICAN AMERICAN: 52
GLUCOSE BLD-MCNC: 100 MG/DL (ref 74–109)
POTASSIUM SERPL-SCNC: 4 MMOL/L (ref 3.5–5)
SODIUM BLD-SCNC: 137 MMOL/L (ref 136–145)
URINE CULTURE, ROUTINE: NORMAL

## 2021-01-15 PROCEDURE — 99232 SBSQ HOSP IP/OBS MODERATE 35: CPT | Performed by: PSYCHIATRY & NEUROLOGY

## 2021-01-15 PROCEDURE — 6370000000 HC RX 637 (ALT 250 FOR IP): Performed by: PSYCHIATRY & NEUROLOGY

## 2021-01-15 PROCEDURE — 1240000000 HC EMOTIONAL WELLNESS R&B

## 2021-01-15 PROCEDURE — 36415 COLL VENOUS BLD VENIPUNCTURE: CPT

## 2021-01-15 PROCEDURE — 80048 BASIC METABOLIC PNL TOTAL CA: CPT

## 2021-01-15 RX ADMIN — CLONIDINE HYDROCHLORIDE 0.1 MG: 0.1 TABLET ORAL at 21:10

## 2021-01-15 RX ADMIN — DILTIAZEM HYDROCHLORIDE 240 MG: 240 CAPSULE, COATED, EXTENDED RELEASE ORAL at 08:57

## 2021-01-15 RX ADMIN — CYANOCOBALAMIN TAB 500 MCG 500 MCG: 500 TAB at 08:57

## 2021-01-15 RX ADMIN — Medication 5 MG: at 21:10

## 2021-01-15 RX ADMIN — PANTOPRAZOLE SODIUM 40 MG: 40 TABLET, DELAYED RELEASE ORAL at 06:06

## 2021-01-15 RX ADMIN — HYDROXYZINE HYDROCHLORIDE 10 MG: 10 TABLET, FILM COATED ORAL at 21:10

## 2021-01-15 RX ADMIN — CLONIDINE HYDROCHLORIDE 0.1 MG: 0.1 TABLET ORAL at 08:57

## 2021-01-15 ASSESSMENT — PAIN SCALES - GENERAL
PAINLEVEL_OUTOF10: 0

## 2021-01-15 NOTE — PROGRESS NOTES
[facility] Daily Shift Assessment  Nursing Progress Note    Room: 0621/621-01 Name: Harjit Rodrigez Age: 80 y.o. Gender: female   Dx: <principal problem not specified>  Precautions: fall risk and seizure precautions  Target Symptoms:   Accu-Chek: NoSleep: Yes,Sleep Quality Good SI No AVH denies [facility]  ADLs: Yes Speech: normal Depression: \"improved\" Anxiety: \"improved\"   Participation LevelActive Listener  Appetite: Good  Respiratory symptoms: No Headache: No Body aches: No Fever: No Cough: No  Patients encouraged to wear masks, wash hands frequently and practice social distancing while on the unit: Yes  Visitation: No   Participation QualityAppropriate    Notes: patient sitting up in bed calm and pleasantly confused during interview. Pt states she slept okay last night and feels better today and states her depression and anxiety are both improved. Patient spoke with her grandson on the phone and pt was overheard telling him that she was in [place] and she was getting to go back home tomorrow. Patient also told grandson that she needed to talk to her mother of whom is . Pt reoriented to unit as needed. Patient denies SI,HI and AVH this shift. Patient has no other complaints at this time. Will continue to monitor for safety this shift.     Signature: Holden Mota

## 2021-01-15 NOTE — PROGRESS NOTES
SW met with treatment team to discuss pt's progress and setbacks. SW 2 was present. Pt reportedly slept well last night, with medications, appetite is good, attends scheduled group activities, social with peers/staff, performs ADL's, compliant with medications, behavior has been cooperative, disoriented/confused at times, reports her depression/anxiety are the \"same\", denies SI/HI/AVH, tentative discharge Sunday, recommend a caretaker at home, SW will contact family to discuss concerns and discharge plans, continue current treatment plan.

## 2021-01-15 NOTE — PROGRESS NOTES
Group Therapy Note    Date: 01/15/21  Start Time: 3025  End Time:  1630    Number of Participants: 4    Type of Group: Healthy Living/Wellness    Patient's Goal:  \"getting to be with grandchildren\"    Notes:  Pt pleasantly confused and calm during group    Status After Intervention:  Improved    Participation Level:  Active Listener    Participation Quality: Appropriate    Speech:  normal    Thought Process/Content: Flight of ideas    Affective Functioning: Congruent    Mood: bright and confused    Level of consciousness:  Alert    Response to Learning: Able to verbalize current knowledge/experience and Able to verbalize/acknowledge new learning    Modes of Intervention: Education and Support    Discipline Responsible: Registered Nurse    Signature: Sebastián Starr RN

## 2021-01-15 NOTE — PROGRESS NOTES
WRAP UP GROUP NOTE:     Patient's Goal:  Providing feedback as to their own progress in the care-plan provided. Pt's have an opportunity to explore self-reflective skills and share any additional cares and concerns not yet addressed. Pt effectively participated. Energy level:  Normal   Appetite:  Normal   Concentration:  Improving   Hallucinations:  Blank   Depression:  Improved   Anxiety:  Blank   How I worked today:  Worked hard  What helps me sleep:  Read   Any questions/complaints/comments:  No   Group/activities that helped me today were:  Nursing education;  Self-enhancement.

## 2021-01-15 NOTE — PLAN OF CARE
Problem: Falls - Risk of:  Goal: Will remain free from falls  Description: Will remain free from falls  1/15/2021 1355 by Tal Palafox RN  Outcome: Ongoing  1/15/2021 0002 by Jorje Robison RN  Outcome: Met This Shift  Goal: Absence of physical injury  Description: Absence of physical injury  1/15/2021 1355 by Tal Palafox RN  Outcome: Ongoing  1/15/2021 0002 by Jorje Robison RN  Outcome: Met This Shift     Problem: Pain:  Description: Pain management should include both nonpharmacologic and pharmacologic interventions.   Goal: Pain level will decrease  Description: Pain level will decrease  1/15/2021 1355 by Tal Palafox RN  Outcome: Ongoing  1/15/2021 0002 by Jorje Robison RN  Outcome: Ongoing  Goal: Control of acute pain  Description: Control of acute pain  1/15/2021 1355 by Tal Palafox RN  Outcome: Ongoing  1/15/2021 0002 by Jorje Robison RN  Outcome: Ongoing  Goal: Control of chronic pain  Description: Control of chronic pain  1/15/2021 1355 by Tal Palafox RN  Outcome: Ongoing  1/15/2021 0002 by Jorje Robison RN  Outcome: Ongoing     Problem: Mental Status - Impaired:  Goal: Mental status will improve  Description: Mental status will improve  1/15/2021 1355 by Tal Palafox RN  Outcome: Ongoing  1/15/2021 0002 by Jorje Robison RN  Outcome: Ongoing     Problem: Anxiety:  Goal: Level of anxiety will decrease  Description: Level of anxiety will decrease  1/15/2021 1355 by Tal Palafox RN  Outcome: Ongoing  1/15/2021 0002 by Jorje Robison RN  Outcome: Ongoing

## 2021-01-15 NOTE — PROGRESS NOTES
Department of Psychiatry  Attending Progress Note     Chief complaint: \"I am wondering why I am here\"    SUBJECTIVE:   Chart reviewed, discussed with the team.  No major issues overnight. Patient remains pleasantly confused. Calm and cooperative. Participates in group activities. No issues with sleep and appetite. Patient is observed in the dining area filling out paperwork. She is calm and cooperative. Pleasantly confused. Confabulates. States she was recently at home watching TV with her . She denies hallucinations. Reports good mood. Hoping to go home soon. OBJECTIVE    Physical  Wt Readings from Last 3 Encounters:   01/13/21 104 lb 3.2 oz (47.3 kg)   09/07/20 102 lb (46.3 kg)   08/16/20 102 lb (46.3 kg)     Temp Readings from Last 3 Encounters:   01/15/21 98.3 °F (36.8 °C) (Temporal)   09/07/20 97.9 °F (36.6 °C)   08/16/20 97.7 °F (36.5 °C) (Temporal)     BP Readings from Last 3 Encounters:   01/15/21 (!) 148/70   09/07/20 (!) 161/43   08/16/20 120/76     Pulse Readings from Last 3 Encounters:   01/15/21 84   09/07/20 76   08/16/20 80        Review of Systems: 14-point review of systems negative except as described above    Mental Status Examination:   Appearance: Younger than stated age. Casually dressed. Wears glasses. Gait stable. No abnormal movements or tremor. Behavior: Calm, cooperative, pleasantly confused  Speech: Normal in tone, volume, and quality. No slurring, dysarthria or pressured speech noted. Mood: \"Good\"   Affect: Mood congruent. Thought Process: Appears linear. Thought Content: Denies suicidal or homicidal ideation. No overt delusions or paranoia appreciated. Perceptions: Denies auditory or visual hallucinations at present time. Not responding to internal stimuli. Concentration: Intact. Orientation: to person, place, date, and situation. Language: Intact. Fund of information: Intact. Memory: Recent and remote appear intact. Neurovegitative: Fair appetite and sleep. Insight: Limited - baseline. Judgment: Limited - baseline.     Data  Lab Results   Component Value Date    WBC 6.8 01/13/2021    HGB 12.1 01/13/2021    HCT 36.6 (L) 01/13/2021    MCV 95.3 01/13/2021     01/13/2021      Lab Results   Component Value Date     01/15/2021    K 4.0 01/15/2021     01/15/2021    CO2 28 01/15/2021    BUN 26 (H) 01/15/2021    CREATININE 1.0 (H) 01/15/2021    GLUCOSE 100 01/15/2021    CALCIUM 8.9 01/15/2021    PROT 7.0 01/13/2021    LABALBU 4.2 01/13/2021    BILITOT 0.3 01/13/2021    ALKPHOS 46 01/13/2021    AST 19 01/13/2021    ALT 11 01/13/2021    LABGLOM 52 (A) 01/15/2021    GFRAA >59 01/15/2021       Medications    Current Facility-Administered Medications:     hydrOXYzine (ATARAX) tablet 10 mg, 10 mg, Oral, TID PRN, Korey Beltre MD, 10 mg at 01/14/21 2106    vitamin B-12 (CYANOCOBALAMIN) tablet 500 mcg, 500 mcg, Oral, Daily, Korey Beltre MD, 500 mcg at 01/14/21 1336    acetaminophen (TYLENOL) tablet 650 mg, 650 mg, Oral, Q4H PRN, Korey Beltre MD    polyethylene glycol (GLYCOLAX) packet 17 g, 17 g, Oral, Daily PRN, Korey Beltre MD    melatonin disintegrating tablet 5 mg, 5 mg, Oral, Nightly, Korey Beltre MD, 5 mg at 01/14/21 2106    cloNIDine (CATAPRES) tablet 0.1 mg, 0.1 mg, Oral, BID, Korey Beltre MD, 0.1 mg at 01/14/21 2106    dilTIAZem (CARDIZEM CD) extended release capsule 240 mg, 240 mg, Oral, Daily, Korey Beltre MD, 240 mg at 01/14/21 5069    pantoprazole (PROTONIX) tablet 40 mg, 40 mg, Oral, ASHANTI GONSALES, Korey Beltre MD, 40 mg at 01/15/21 0606      ASSESSMENT AND PLAN  DSM 5 DIAGNOSIS  Impression  Psychosis unspecified, resolved  Rule out medication-induced psychosis  Rule out Perla Boning syndrome  Dementia without behavioral disturbance   Vitamin B12 deficiency No evidence of acute psychosis on file. No agitation. Presentation consistent with dementia. Patient is likely close to her baseline. Continue to observe and plan to discharge to family tomorrow. Patient would benefit from having a caretaker at home. Plan:   1. Psychiatric Medications:   Continue current psychotropic medications as recommended. Monitor for side effects. The risks, benefits, side effects, indications, contraindications, alternatives and adverse effects of the medications have been discussed with patient. 2. Continue to provide supportive psychotherapy. Encourage socialization and participation in recreational activities. Work on coping skills. 3. Medical Issues:    Continue medical monitoring by Dr. Burnie Osler and associates. Renal function improved. Supplement vitamin B12.    4. Disposition:     to provide outpatient resources and facilitate disposition.      Amount of time spent with patient:      25 minutes with greater than 50 % of the time spent in counseling and collaboration of care

## 2021-01-15 NOTE — H&P
HISTORY and PHYSICAL      CHIEF COMPLAINT:  Psychosis    Reason for Admission:  Psychosis    History Obtained From:  Patient, chart    HISTORY OF PRESENT ILLNESS:      The patient is a 80 y.o. female who is admitted to the [place] [address] unit with worsening mood issues. She has no c/o CP or SOA. No abdominal pain or N/V. No dysuria. No new pain complaints. No fevers. Past Medical History:        Diagnosis Date    Heart palpitations     Hypertension      Past Surgical History:        Procedure Laterality Date    CAROTID ENDARTERECTOMY      CHOLECYSTECTOMY      HYSTERECTOMY      KIDNEY STONE SURGERY           Medications Prior to Admission:    Medications Prior to Admission: vitamin D (ERGOCALCIFEROL) 1.25 MG (64853 UT) CAPS capsule, Take 50,000 Units by mouth once a week  LORazepam (ATIVAN) 0.5 MG tablet, Take 0.5 mg by mouth every 8 hours as needed for Anxiety. estradiol (ESTRACE) 1 MG tablet, Take 1 mg by mouth daily  potassium chloride (MICRO-K) 10 MEQ extended release capsule, Take 10 mEq by mouth daily   Multiple Vitamins-Minerals (THERAPEUTIC MULTIVITAMIN-MINERALS) tablet, Take 1 tablet by mouth daily  citalopram (CELEXA) 10 MG tablet, Take 10 mg by mouth daily  pantoprazole sodium (PROTONIX) 40 MG PACK packet, Take 40 mg by mouth every morning (before breakfast)  cloNIDine (CATAPRES) 0.1 MG tablet, Take 0.1 mg by mouth 2 times daily  diltiazem (DILACOR XR) 240 MG extended release capsule, Take 240 mg by mouth daily    Allergies:  Codeine    Social History:   TOBACCO:   reports that she has never smoked. She has never used smokeless tobacco.  ETOH:   reports no history of alcohol use. DRUGS:   reports no history of drug use.         Family History:       Problem Relation Age of Onset    Stroke Mother         CAD, palpitations,  12     REVIEW OF SYSTEMS:  Constitutional: neg  CV: neg  Pulmonary: neg  GI: neg  : neg  Psych: psychosis

## 2021-01-15 NOTE — ACP (ADVANCE CARE PLANNING)
Advance Care Planning     Advance Care Planning Activator (Inpatient)  Conversation Note      Date of ACP Conversation: 1/15/202`    [employer] with: Patient  Patient said that her children know her End-of-Life wishes, and would respect them.     ACP Activator: [facility] Decision Maker:     Current Designated Health Care Decision Maker:   Primary Decision Maker: Robert Hernandez - [phone]    Secondary Decision Maker: Gladysmeredith Sum - [phone]      Length of ACP Conversation in minutes:  25 minutes    Conversation Outcomes:  [x] Existing advance directive reviewed with patient; no changes to patient's previously recorded wishes      Follow-up plan:    [x] Schedule follow-up conversation to continue planning       When appropriate          Electronically signed by Nalini Genao  EdmondsonMorgan Everett on 1/15/2021 at 4:09 PM

## 2021-01-15 NOTE — PROGRESS NOTES
[facility] Daily Shift Assessment-Geriatric Unit  Nursing Progress Note          Room: [address] [place] [address]   Name: Harshad Parkinson   Age: 80 y.o. Gender: female   Dx: <principal problem not specified>  Precautions: fall risk and seizure precautions  Inpatient Status: voluntary     SLEEP:    Sleep: Yes,   Sleep Quality Good   Hours Slept: 6   Sleep Medications: Yes  PRN Sleep Meds: No       MEDICAL:      Other PRN Meds: Yes   Med Compliant: Yes   Accu-Chek: No   Oxygen/CPAP/BiPAP: No  CIWA/CINA: No   PAIN Assessment: none  Side Effects from medication: No    Is Patient experiencing any respiratory symptoms (headache, fever, body aches, cough. Sofia Chi ): no  Patient educated by nursing to practice social distancing, wear masks, wash hands frequently: yes      Metabolic Screening:    Lab Results   Component Value Date    LABA1C 5.3 01/14/2021       Lab Results   Component Value Date    CHOL 183 01/14/2021     Lab Results   Component Value Date    TRIG 65 01/14/2021     Lab Results   Component Value Date    HDL 66 01/14/2021     No components found for: LDLCAL  No results found for: LABVLDL      Body mass index is 18.46 kg/m².     BP Readings from Last 2 Encounters:   01/14/21 (!) 144/60   09/07/20 (!) 161/43         PSYCH:     SI denies suicidal ideation    HI Negative for homicidal ideation        AVH:Absent      Depression:  Did not rate  Anxiety:  improved      GENERAL:      Appetite: good  Social: Yes Speech: normal   Appearance:appropriately dressed and healthy looking  Assistive Devices: noneLevel of Assist: Independent      GROUP:    Group Participation: Yes  Participation LevelMinimal    Participation Andrés Nunez Notes:   Patient confused and worried that her daughter does not know where she is. Patient assured that her daughter is aware that she is in the hospital and is doing okay. Darcy Peterson continues to come back to the desk with the same questions numerous times, is redirected, seems to understand, and then will be back at the desk asking the same questions and states, \"I just don't want her worrying about where I am.\"  Patient compliant with taking medications. Patient encouraged to go to room and lay down to rest after taking her nightly medications. Patient up to the day area asking to talk with the nurse saying, \"I forgot what I was wanting to talk to you about. I am worried about something, but don't remember what it is. Why am I here? \"  Discussed with patient that she was having some problems with seeing things that weren't there. Talked about how they are real to her, that she may see them, but others don't. Patient voiced understanding. Assured patient she was safe and no one was going to hurt her. Patient wearing mask when in the common areas, reminded to wash hands frequently and to report any signs of Covid. No signs or symptoms noted at this time. Will continue to monitor for changes and for safety.       Chandler Poon RN

## 2021-01-16 VITALS
RESPIRATION RATE: 16 BRPM | SYSTOLIC BLOOD PRESSURE: 157 MMHG | HEIGHT: 63 IN | WEIGHT: 104.2 LBS | TEMPERATURE: 98.4 F | DIASTOLIC BLOOD PRESSURE: 68 MMHG | OXYGEN SATURATION: 95 % | BODY MASS INDEX: 18.46 KG/M2 | HEART RATE: 89 BPM

## 2021-01-16 PROCEDURE — 6370000000 HC RX 637 (ALT 250 FOR IP): Performed by: PSYCHIATRY & NEUROLOGY

## 2021-01-16 PROCEDURE — 6370000000 HC RX 637 (ALT 250 FOR IP): Performed by: FAMILY MEDICINE

## 2021-01-16 PROCEDURE — 99239 HOSP IP/OBS DSCHRG MGMT >30: CPT | Performed by: PSYCHIATRY & NEUROLOGY

## 2021-01-16 RX ORDER — HYDROXYZINE HYDROCHLORIDE 10 MG/1
10 TABLET, FILM COATED ORAL 3 TIMES DAILY PRN
Qty: 30 TABLET | Refills: 1 | Status: SHIPPED | OUTPATIENT
Start: 2021-01-16

## 2021-01-16 RX ORDER — MECOBALAMIN 5000 MCG
5 TABLET,DISINTEGRATING ORAL NIGHTLY
Qty: 30 TABLET | Refills: 1 | Status: SHIPPED | OUTPATIENT
Start: 2021-01-16

## 2021-01-16 RX ORDER — CLONIDINE HYDROCHLORIDE 0.1 MG/1
0.1 TABLET ORAL 3 TIMES DAILY
Status: DISCONTINUED | OUTPATIENT
Start: 2021-01-16 | End: 2021-01-16 | Stop reason: HOSPADM

## 2021-01-16 RX ADMIN — DILTIAZEM HYDROCHLORIDE 240 MG: 240 CAPSULE, COATED, EXTENDED RELEASE ORAL at 09:08

## 2021-01-16 RX ADMIN — PANTOPRAZOLE SODIUM 40 MG: 40 TABLET, DELAYED RELEASE ORAL at 06:22

## 2021-01-16 RX ADMIN — CYANOCOBALAMIN TAB 500 MCG 500 MCG: 500 TAB at 09:08

## 2021-01-16 RX ADMIN — CLONIDINE HYDROCHLORIDE 0.1 MG: 0.1 TABLET ORAL at 09:09

## 2021-01-16 ASSESSMENT — PAIN SCALES - GENERAL
PAINLEVEL_OUTOF10: 0
PAINLEVEL_OUTOF10: 0

## 2021-01-16 NOTE — PLAN OF CARE
Problem: Falls - Risk of:  Goal: Will remain free from falls  Description: Will remain free from falls  1/16/2021 0846 by Makenzie Fuller RN  Outcome: Ongoing  1/16/2021 0237 by Sera Watkins RN  Outcome: Met This Shift  Goal: Absence of physical injury  Description: Absence of physical injury  1/16/2021 0846 by Makenzie Fuller RN  Outcome: Ongoing  1/16/2021 0237 by Sera Watkins RN  Outcome: Met This Shift     Problem: Pain:  Goal: Pain level will decrease  Description: Pain level will decrease  1/16/2021 0846 by Makenzie Fuller RN  Outcome: Ongoing  1/16/2021 0237 by Sera Watkins RN  Outcome: Ongoing  Goal: Control of acute pain  Description: Control of acute pain  1/16/2021 0846 by Makenzie Fuller RN  Outcome: Ongoing  1/16/2021 0237 by Sera Watkins RN  Outcome: Ongoing  Goal: Control of chronic pain  Description: Control of chronic pain  1/16/2021 0846 by Makenzie Fuller RN  Outcome: Ongoing  1/16/2021 0237 by Sera Watkins RN  Outcome: Ongoing     Problem: Mental Status - Impaired:  Goal: Mental status will improve  Description: Mental status will improve  1/16/2021 0846 by Makenzie Fuller RN  Outcome: Ongoing  1/16/2021 0237 by Sera Watkins RN  Outcome: Ongoing     Problem: Anxiety:  Goal: Level of anxiety will decrease  Description: Level of anxiety will decrease  1/16/2021 0846 by Makenzie Fuller RN  Outcome: Ongoing  1/16/2021 0237 by Sera Watkins RN  Outcome: Ongoing

## 2021-01-16 NOTE — PLAN OF CARE
Problem: Falls - Risk of:  Goal: Will remain free from falls  Description: Will remain free from falls  1/16/2021 0237 by Rosalind Gan RN  Outcome: Met This Shift  1/15/2021 1355 by René Briscoe RN  Outcome: Ongoing  Goal: Absence of physical injury  Description: Absence of physical injury  1/16/2021 0237 by Rosalind Gan RN  Outcome: Met This Shift  1/15/2021 1355 by René Briscoe RN  Outcome: Ongoing     Problem: Pain:  Goal: Pain level will decrease  Description: Pain level will decrease  1/16/2021 0237 by Rosalind Gan RN  Outcome: Ongoing  1/15/2021 1355 by René Briscoe RN  Outcome: Ongoing  Goal: Control of acute pain  Description: Control of acute pain  1/16/2021 0237 by Rosalind Gan RN  Outcome: Ongoing  1/15/2021 1355 by René Briscoe RN  Outcome: Ongoing  Goal: Control of chronic pain  Description: Control of chronic pain  1/16/2021 0237 by Rosalind Gan RN  Outcome: Ongoing  1/15/2021 1355 by René Briscoe RN  Outcome: Ongoing     Problem: Mental Status - Impaired:  Goal: Mental status will improve  Description: Mental status will improve  1/16/2021 0237 by Rosalind Gan RN  Outcome: Ongoing  1/15/2021 1355 by René Briscoe RN  Outcome: Ongoing     Problem: Anxiety:  Goal: Level of anxiety will decrease  Description: Level of anxiety will decrease  1/16/2021 0237 by Rosalind Gan RN  Outcome: Ongoing  1/15/2021 1355 by René Briscoe RN  Outcome: Ongoing

## 2021-01-16 NOTE — PROGRESS NOTES
[facility]  Discharge Note  Bridge Appointment completed: Reviewed Discharge Instructions with patient. Patient verbalizes understanding and agreement with the discharge plan using the teachback method. Referral for Outpatient Tobacco Cessation Counseling, upon discharge (zonia X if applicable and completed):    ( )  Hospital staff assisted patient to call Quit Line or faxed referral                                   during hospitalization                  ( )  Recognizing danger situations (included triggers and roadblocks), if not completed on admission                    ( )  Coping skills (new ways to manage stress, exercise, relaxation techniques, changing routine, distraction), if not completed on admission                                                           ( )  Basic information about quitting (benefits of quitting, techniques in how to quit, available resources, if not completed on admission  ( ) Referral for counseling faxed to [facility]   ( x) Patient refused referral  (x ) Patient refused counseling  (x ) Patient refused smoking cessation medication upon discharge    Vaccinations (zonia X if applicable and completed):  ( ) Patient states already received influenza vaccine elsewhere  ( ) Patient received influenza vaccine during this hospitalization  (x ) Patient refused influenza vaccine at this time      Pt discharged with followings belongings:   Dentures: None  Vision - Corrective Lenses: Glasses  Hearing Aid: None  Jewelry: Ring  Body Piercings Removed: N/A  Clothing:  Footwear, Pants, Socks, Shirt, Jacket / coat  Were All Patient Medications Collected?: Not Applicable  Other Valuables: Linda Agosto

## 2021-01-16 NOTE — PROGRESS NOTES
WRAP UP GROUP NOTE:     Patient's Goal:  Providing feedback as to their own progress in the care-plan provided. Pt's have an opportunity to explore self-reflective skills and share any additional cares and concerns not yet addressed. Pt effectively participated. Energy level:  Normal   Appetite:  Normal   Concentration:   Good   Hallucinations:  Gone   Depression:  Blank   Anxiety:  Blank   How I worked today:  I feel I achieved/tried a lot  What helps me sleep:  Try a relaxation exercise, read  Any questions/complaints/comments:  Our leaders are good and explain very well. Group/activities that helped me today were:  Nursing education--same      Sat in the day area with this patient and 2 other patients and talked about things we used to do and work that we used to do. 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 This writer sat and talked with the patients for about 30+ minutes. Patients seemed to enjoy interaction with staff and peers.

## 2021-01-16 NOTE — PROGRESS NOTES
[facility] Daily Shift Assessment-Geriatric Unit  Nursing Progress Note          Room: [address] [place] [address]   Name: Michael Williamson   Age: 80 y.o. Gender: female   Dx: <principal problem not specified>  Precautions: suicide risk, fall risk and seizure precautions  Inpatient Status: voluntary     SLEEP:    Sleep: Yes,   Sleep Quality Good   Hours Slept: 6   Sleep Medications: Yes  PRN Sleep Meds: No       MEDICAL:      Other PRN Meds: Yes   Med Compliant: Yes   Accu-Chek: No   Oxygen/CPAP/BiPAP: No  CIWA/CINA: No   PAIN Assessment: none  Side Effects from medication: No    Is Patient experiencing any respiratory symptoms (headache, fever, body aches, cough. Ulus Reusing ): no  Patient educated by nursing to practice social distancing, wear masks, wash hands frequently: yes      Metabolic Screening:    Lab Results   Component Value Date    LABA1C 5.3 01/14/2021       Lab Results   Component Value Date    CHOL 183 01/14/2021     Lab Results   Component Value Date    TRIG 65 01/14/2021     Lab Results   Component Value Date    HDL 66 01/14/2021     No components found for: LDLCAL  No results found for: LABVLDL      Body mass index is 18.46 kg/m².     BP Readings from Last 2 Encounters:   01/15/21 (!) 158/69   09/07/20 (!) 161/43         PSYCH:     SI denies suicidal ideation    HI Negative for homicidal ideation        AVH:Absent      Depression: 0 Anxiety: 0       GENERAL:      Appetite: good  Social: Yes Speech: normal   Appearance:appropriately dressed and healthy looking  Assistive Devices: noneLevel of Assist: Independent      GROUP:    Group Participation: Yes  Participation LevelInteractive    Participation Jazbegreg Garrison

## 2021-01-16 NOTE — DISCHARGE SUMMARY
Discharge Summary     Patient ID:  Roland Berger  [id number]  8 y.o.  4/25/1927    Admit date: 1/13/2021  Discharge date: 1/16/2021    Admitting Physician: Ulises Truong MD   Attending Physician: Ulises Truong MD  Discharge Provider: Jyothi Miller     Admission Diagnoses: Hallucinations [R44.3]  Psychosis, unspecified psychosis type [facility]) [F29]    Discharge Diagnoses: Psychosis unspecified, resolved  Rule out medication-induced psychosis  Dementia without behavioral disturbance   Vitamin B12 deficiency    Admission Condition: poor    Discharged Condition: good    Indication for Admission: Hallucinations, paranoid ideations    HPI:  59-year-old white female with history of depression, admitted for visual hallucinations and paranoia. Patient was reportedly seeing girls and a man at home. She was mildly paranoid in the ER. She reported incontinence and forgetfulness. UDS negative, BAL less than 10. Came with mild BRO and moderate LES per UA. CT head showed evidence of microvascular ischemia. Patient has been calm and cooperative on the unit. She is pleasant during the interview this morning. Appears young for her age. She is oriented to person. Knows she is at the hospital.  Unable to tell me the date. Knows [place] is president. States she started seeing people in her house several weeks ago. She denies auditory hallucinations. She understands that what she saw is not real.  She denies having problems with her vision. She wears glasses. There is no history of retinopathy or macular degeneration. Patient goes to an ophthalmologist on a regular basis. She denies depression and anxiety. \"I am a happy person. \"  States she usually takes vitamins and aspirin at home. She denies taking Ativan, however, per Jose Luis Garcia, it was filled in December. Patient lives alone and performs ADLs. She still pays her bills. She initially tells me her  who was a surgeon is . Later, states he travels with basketball teams. States she feels confused. Reports poor sleep and appetite. She is spiritual and uses prayer as a coping skill.     PSYCHIATRIC HISTORY:    Diagnoses: Denies  Suicide attempts/gestures: Denies   Prior hospitalizations: Denies   Medication trials: Celexa, Ativan  Mental health contact: None   Head trauma: Denies      Hospital Course:   Patient was admitted to the adult psych behavioral health floor and was acclimated to the floor. Labs were reviewed and physical exam was completed by Dr. Jo Arcos and associates. Home medications were reconciled. MILE was obtained and reviewed. Medication changes were made and patient tolerated well with no side effects. During the hospital stay patient's home medications have been restarted at previously prescribed and recommended dose, with an exception of lorazepam, which has been discontinued. Patient has been started on melatonin 5 mg at bedtime for insomnia with beneficial effect. Hydroxyzine 10 mg 3 times a day as needed has been prescribed for anxiety with beneficial effect. While in the hospital patient has been diagnosed with vitamin B12 deficiency and she has been started on vitamin B12 500 MCG daily. Patient attended and participated in groups. The patient did interact well with other patients and staff on the unit. Behaviorally she was not a problem. Patient was compliant with her medications. Patient was sleeping through the night. This patient is not suicidal, homicidal or psychotic at discharge. She does not present a danger to self or others. With the above mentioned medications changes as well as psychotherapeutic interventions, the patient reported considerable improvement in her condition. On 01/16/2021 it was therefore decided to discharge the patient, as it was felt that she received maximal benefit from her hospitalization.       Number of antipsychotic medication prescribed at discharge: None  IF MORE THAN ONE IS USED: NA    History of greater than 3 failed multiple monotherapy trials: NA  Monotherapy taper plan/ cross taper in progress: NA  Augmentation of Clozapine: NA    Referral to addiction treatment: NA    Prescription for Alcohol or Drug Disorder Medication: NA    Prescription for Tobacco Cessation medication: NA    If no prescriptions for Tobacco Cessation must document why: Patient is a non-smoker    Consults: Internal medicine    Significant Diagnostic Studies:     Lab Results   Component Value Date    WBC 6.8 01/13/2021    HGB 12.1 01/13/2021    HCT 36.6 (L) 01/13/2021    MCV 95.3 01/13/2021     01/13/2021       Recent Labs     01/15/21  0518      K 4.0      CO2 28   BUN 26*   CREATININE 1.0*   GLUCOSE 100   CALCIUM 8.9       Recent Labs     01/13/21  1305   BARBSCNU Negative   LABBENZ Negative        Treatments: therapies: RN and JED    Mental Status Examination:  Alert, Oriented X 4  Appearance:  Proper Grooming and Hygiene Speech with Regular Rate and Rhythm  Eye Contact:  Good  No Psychomotor Agitation/Retardation Noted  Attitude:  Cooperative  Mood:  \"Good\"  Affective: Congruent, appropriate to the situation, with a normal range and intensity  Thought Processes:  Coherently communicated, logical and goal oriented  Thought Content:  No Suicidal Ideation, No Homicidal Ideation, No Auditory or Visual Hallucinations, No Overt Delusions  Insight:  Present  Judgement:  Normal  Memory is intact for both remote and recent  Intellectual Functioning:  Within the [address] of Knowledge:  Adequate  Attention and Concentration:  Adequate      Discharge Exam:  Please, see medical note    Disposition: home    Patient Instructions:   Current Discharge Medication List      START taking these medications    Details   melatonin 5 MG TBDP disintegrating tablet Take 1 tablet by mouth nightly  Qty: 30 tablet, Refills: 1      vitamin B-12 500 MCG tablet Take 1 tablet by mouth daily  Qty: 30 tablet, Refills: 3      hydrOXYzine (ATARAX) 10 MG tablet Take 1 tablet by mouth 3 times daily as needed for Anxiety  Qty: 30 tablet, Refills: 1         CONTINUE these medications which have NOT CHANGED    Details   vitamin D (ERGOCALCIFEROL) 1.25 MG (81780 UT) CAPS capsule Take 50,000 Units by mouth once a week      estradiol (ESTRACE) 1 MG tablet Take 1 mg by mouth daily      potassium chloride (MICRO-K) 10 MEQ extended release capsule Take 10 mEq by mouth daily       Multiple Vitamins-Minerals (THERAPEUTIC MULTIVITAMIN-MINERALS) tablet Take 1 tablet by mouth daily      citalopram (CELEXA) 10 MG tablet Take 10 mg by mouth daily      pantoprazole sodium (PROTONIX) 40 MG PACK packet Take 40 mg by mouth every morning (before breakfast)      cloNIDine (CATAPRES) 0.1 MG tablet Take 0.1 mg by mouth 2 times daily      diltiazem (DILACOR XR) 240 MG extended release capsule Take 240 mg by mouth daily         STOP taking these medications LORazepam (ATIVAN) 0.5 MG tablet Comments:   Reason for Stopping:             Activity: activity as tolerated  Diet: regular diet  Wound Care: none needed    Follow-up with PCP in 2 weeks.     Time worked: More than 31 minutes    Participation: good    Electronically signed by Laila Suazo MD on 1/16/2021 at 12:53 PM

## 2021-01-16 NOTE — GROUP NOTE
Group Therapy Note    Date: 1/16/2021    Group Start Time: 0830  Group End Time: 0930  Group Topic: Community Meeting    [facility] GERIATRIC BEHAVIORAL UNIT    Tennille Lanza RN        Group Therapy Note    Attendees:          Patient's Goal:  \"positive thinking\"    Notes:  Patient calm and cooperative during group    Status After Intervention:  Improved    Participation Level:  Active Listener    Participation Quality: Appropriate      Speech:  normal      Thought Process/Content: Logical      Affective Functioning: Congruent      Mood: bright      Level of consciousness:  Alert      Response to Learning: Able to verbalize current knowledge/experience and Able to verbalize/acknowledge new learning      Endings: None Reported    Modes of Intervention: Education and Support      Discipline Responsible: Registered Nurse      Signature:  Amadou Washington RN

## 2021-01-17 NOTE — PROGRESS NOTES
SW did not need to do an aftercare call on this pt because she left with her daughter and returned to personal care home, so they will provide everything she needs and answer any questions she may have.

## 2021-01-18 ENCOUNTER — TELEPHONE (OUTPATIENT)
Dept: INTERNAL MEDICINE | Facility: CLINIC | Age: 86
End: 2021-01-18

## 2021-01-18 NOTE — PROGRESS NOTES
Discharge Note     Pt discharging on this date. Pt denies SI, HI, and AVH at this time. Pt reports improvement in behavior and is leaving unit in overall good condition. SW and pt discussed pt's follow up appointments and importance of attending appointments as scheduled, pt voiced understanding and agreement. Pt and SW also discussed pt safety plan and pt able to verbally identify: warning signs, coping strategies, places and people that help make the pt feel better/distract negative thoughts, friends/family/agencies/professionals the pt can reach out to in a crisis, and something that is important to the pt/worth living for. Pt provided the national suicide prevention hotline number (1-546.583.8275) as well as local community behavioral health [facility]) crisis number for emergencies (1-568.943.2319).      Pt to follow up with:      Referral to out patient tobacco cessation counseling treatment:    Patient refused tobacco cessation counseling    SW offered to assist pt with transportation, pt reports that she has transporation home

## 2021-01-18 NOTE — TELEPHONE ENCOUNTER
Mimi @ [facility] called re: B12 shots vs pills. She can be reached at [phone]. She also needs record of Flu & pneumonia vac.

## 2021-01-18 NOTE — TELEPHONE ENCOUNTER
SOUMYA Le to call me back.  B12 injection is always preferred, if they are able to administer this.  I don't show that patient has had pneumonia vaccine, but she was a volunteer at  and they might have records, but unsure.

## 2021-01-18 NOTE — TELEPHONE ENCOUNTER
MICHELET CALLED STATING PATIENT  WENT TO [facility] AND WANT MANUEL TO STILL BE HER PCP AND WANTING TO KNOW ANY RECOMMENDATION ON WHAT ELSE NEEDS TO BE DONE   PLEASE ADVISE:   [phone]

## 2021-01-25 ENCOUNTER — TELEPHONE (OUTPATIENT)
Dept: INTERNAL MEDICINE | Facility: CLINIC | Age: 86
End: 2021-01-25

## 2021-01-25 NOTE — TELEPHONE ENCOUNTER
[facility] HER BACK ON LORazepam (ATIVAN) 0.5 MG tablet.     NURSE STATED SHE HAS BEEN UPSET WITHOUT IT.    PLEASE CALL AND ADVISE: [phone]

## 2021-01-25 NOTE — TELEPHONE ENCOUNTER
Svetlana Garcia had called asking her to be put back on Ativan 0.5mg q8hrs.  I called dgt and inquired - she says when she took her to the ER at  for hallucinations, they took her off Ativan and put her on Hydroxyzine 10mg tid prn.  Dgt says her anxiety is uncontrolled and she needs something, if not the Ativan.  Please advise.

## 2021-01-26 ENCOUNTER — TELEPHONE (OUTPATIENT)
Dept: INTERNAL MEDICINE | Facility: CLINIC | Age: 86
End: 2021-01-26

## 2021-01-26 RX ORDER — CITALOPRAM 10 MG/1
20 TABLET ORAL DAILY
Qty: 60 TABLET | Refills: 11
Start: 2021-01-26 | End: 2021-02-23

## 2021-01-26 NOTE — TELEPHONE ENCOUNTER
"Called dgt and she says pt is calling her and her , stating she \"just wants to die\" and she \"just wants to go home\", then she will hang up.  Dr. Sinclair gave orders this morning to increase her Citalopram to 20mg.  When I talked with [facility] about dose change, Sherri, the  got on the phone, stating they are going to have to discharge her if she doesn't get under control, as is upsetting her neighbors with her outbursts.  I asked her if they can give the medication change a few days to see if it helps and she would not answer that question.  Dr. Sinclair asked me to call kilo back, and inform her what [facility] had told me and that he legally and ethically cannot sedate her for this purpose and that she likely will need to be in a NH.  Kilo voiced understanding, though says S[facility] had not told her yet that they would need to discharge her, they had just told her that the Dr needed to give her medication to calm her down.  She will check into NH bed availability of her choice so that she has a plan in place if [facility] calls and tells her they have to d/c her.  "

## 2021-01-26 NOTE — TELEPHONE ENCOUNTER
RX PENDED FOR CHANGE IN DOSE    Talked with dgt and informed of change and called [facility] and gave medication change orders to Mimi.

## 2021-01-26 NOTE — TELEPHONE ENCOUNTER
PATIENTS DAUGHTER CALLED IN REQUESTING A CALL BACK FROM NURSE SHE SPOKE TO YESTERDAY IF POSSIBLE  SHE HAS MORE QUESTIONS AND CONCERNS AND SHE STATES THE   FACILITY IS HAVING A PRETTY ROUGH TIME WITH PATIENT    PATIENTS DAUGHTER REQUESTING A CALL BACK AS SOON AS POSSIBLE    GOOD CALL BACK  [phone]

## 2021-01-29 ENCOUNTER — OUTSIDE FACILITY SERVICE (OUTPATIENT)
Dept: CARDIOLOGY | Facility: CLINIC | Age: 86
End: 2021-01-29

## 2021-01-29 PROCEDURE — 93010 ELECTROCARDIOGRAM REPORT: CPT | Performed by: INTERNAL MEDICINE

## 2021-02-23 RX ORDER — CITALOPRAM 20 MG/1
20 TABLET ORAL DAILY
Qty: 30 TABLET | Refills: 2 | Status: SHIPPED | OUTPATIENT
Start: 2021-02-23 | End: 2021-06-15

## 2021-03-01 RX ORDER — CLONIDINE HYDROCHLORIDE 0.1 MG/1
TABLET ORAL
Qty: 60 TABLET | Refills: 11 | Status: SHIPPED | OUTPATIENT
Start: 2021-03-01

## 2021-03-02 ENCOUNTER — TELEPHONE (OUTPATIENT)
Dept: INTERNAL MEDICINE | Facility: CLINIC | Age: 86
End: 2021-03-02

## 2021-03-02 NOTE — TELEPHONE ENCOUNTER
Called Mimi yesterday, as we had received a medication list for Dr. Sinclair to sign, but had medications on it that we are not aware of - Risperidone, Trazodone, and Lisinopril (listed as intolerant to ace).  She says they had to send her to [facility] because she was suicidal and they started these medications.  She said she had faxed that info to us, from [facility], but I don't see that it is in her chart, so she is going to fax again and will have Dr. Sinclair review.

## 2021-03-02 NOTE — TELEPHONE ENCOUNTER
LOUIE SUITES CALLS       REQUESTING A CALL BACK TO GO OVER HER MEDICATIONS     GOOD CONTACT NUMBER   [phone]

## 2021-03-08 RX ORDER — CHOLECALCIFEROL (VITAMIN D3) 125 MCG
5 CAPSULE ORAL NIGHTLY
COMMUNITY
End: 2021-07-29

## 2021-03-08 RX ORDER — RISPERIDONE 0.25 MG/1
0.25 TABLET ORAL 2 TIMES DAILY
COMMUNITY
End: 2021-03-12 | Stop reason: SDUPTHER

## 2021-03-08 RX ORDER — MONTELUKAST SODIUM 10 MG/1
10 TABLET ORAL NIGHTLY
COMMUNITY
End: 2021-03-10

## 2021-03-08 RX ORDER — POTASSIUM CHLORIDE 750 MG/1
10 TABLET, FILM COATED, EXTENDED RELEASE ORAL DAILY
COMMUNITY

## 2021-03-08 RX ORDER — HYDROXYZINE HYDROCHLORIDE 10 MG/1
10 TABLET, FILM COATED ORAL 3 TIMES DAILY PRN
COMMUNITY
End: 2021-03-10

## 2021-03-08 RX ORDER — LISINOPRIL 10 MG/1
10 TABLET ORAL DAILY
COMMUNITY
End: 2021-03-12 | Stop reason: SDUPTHER

## 2021-03-08 RX ORDER — TRAZODONE HYDROCHLORIDE 50 MG/1
50 TABLET ORAL NIGHTLY
COMMUNITY
End: 2021-03-12 | Stop reason: SDUPTHER

## 2021-03-10 ENCOUNTER — OFFICE VISIT (OUTPATIENT)
Dept: INTERNAL MEDICINE | Facility: CLINIC | Age: 86
End: 2021-03-10

## 2021-03-10 VITALS
WEIGHT: 112 LBS | HEART RATE: 68 BPM | DIASTOLIC BLOOD PRESSURE: 70 MMHG | SYSTOLIC BLOOD PRESSURE: 160 MMHG | TEMPERATURE: 97.1 F | HEIGHT: 63 IN | OXYGEN SATURATION: 99 % | BODY MASS INDEX: 19.84 KG/M2

## 2021-03-10 DIAGNOSIS — I10 ESSENTIAL HYPERTENSION: ICD-10-CM

## 2021-03-10 DIAGNOSIS — R41.3 MEMORY DISORDER: ICD-10-CM

## 2021-03-10 DIAGNOSIS — F01.50 VASCULAR DEMENTIA WITHOUT BEHAVIORAL DISTURBANCE (HCC): ICD-10-CM

## 2021-03-10 DIAGNOSIS — D53.1 VITAMIN B12 DEFICIENT MEGALOBLASTIC ANEMIA: Primary | ICD-10-CM

## 2021-03-10 PROBLEM — F29 PSYCHOSIS (HCC): Status: ACTIVE | Noted: 2021-01-14

## 2021-03-10 PROBLEM — F03.90 DEMENTIA WITHOUT BEHAVIORAL DISTURBANCE (HCC): Status: ACTIVE | Noted: 2021-03-10

## 2021-03-10 PROBLEM — R44.3 HALLUCINATIONS: Status: ACTIVE | Noted: 2021-01-14

## 2021-03-10 PROCEDURE — 99214 OFFICE O/P EST MOD 30 MIN: CPT | Performed by: INTERNAL MEDICINE

## 2021-03-10 NOTE — PROGRESS NOTES
Subjective   Sabiha Sherwood is a 89 y.o. female.   Chief Complaint   Patient presents with   • Hypertension     3 month follow up    • weight check       History of Present Illness this is a follow-up visit for Sabiha Sherwood who is 89 years old and living in a assisted living facility.  She recently had some behavioral problems required admission to [facility] there they adjusted her medications and indeed placed her on some antipsychotic medications would seem to be helping her behavior.  Patient is also beginning to gain some weight which is a very good thing is she had been underweight for quite some time.    The following portions of the patient's history were reviewed and updated as appropriate: allergies, current medications, past family history, past medical history, past social history, past surgical history and problem list.    Review of Systems   Unable to perform ROS: Dementia       Objective   Past Medical History:   Diagnosis Date   • Acute renal failure syndrome (CMS/HCC)    • Arrhythmia    • Diverticulitis    • Hiatal hernia    • Hypertension    • UTI (urinary tract infection)    • Vertigo       Past Surgical History:   Procedure Laterality Date   • CHOLECYSTECTOMY     • COLONOSCOPY N/A 5/10/2019    Procedure: COLONOSCOPY WITH ANESTHESIA;  Surgeon: Steven Bassett MD;  Location: [facility] ENDOSCOPY;  Service: Gastroenterology   • ENDOSCOPY N/A 5/10/2019    Procedure: ESOPHAGOGASTRODUODENOSCOPY WITH ANESTHESIA;  Surgeon: Steven Bassett MD;  Location: [facility] ENDOSCOPY;  Service: Gastroenterology   • HYSTERECTOMY          Current Outpatient Medications:   •  Calcium Carbonate-Vitamin D (CALTRATE 600+D PO), Take 1 tablet by mouth Daily., Disp: , Rfl:   •  citalopram (CeleXA) 20 MG tablet, Take 1 tablet by mouth Daily., Disp: 30 tablet, Rfl: 2  •  cloNIDine (CATAPRES) 0.1 MG tablet, TAKE ONE TABLET BY MOUTH 2 TIMES A DAY, Disp: 60 tablet, Rfl: 11  •  diltiaZEM CD (CARDIZEM CD) 240  MG 24 hr capsule, Take 240 mg by mouth Daily., Disp: , Rfl:   •  lisinopril (PRINIVIL,ZESTRIL) 10 MG tablet, Take 10 mg by mouth Daily., Disp: , Rfl:   •  melatonin 5 MG tablet tablet, Take 5 mg by mouth Every Night., Disp: , Rfl:   •  Multiple Vitamins-Minerals (MULTIPLE VITAMINS/WOMENS PO), Take 1 tablet by mouth Daily., Disp: , Rfl:   •  nitroglycerin (Nitrostat) 0.4 MG SL tablet, Place  under the tongue., Disp: , Rfl:   •  pantoprazole (PROTONIX) 40 MG EC tablet, Take 40 mg by mouth Daily., Disp: , Rfl:   •  potassium chloride 10 MEQ CR tablet, Take 10 mEq by mouth Daily., Disp: , Rfl:   •  risperiDONE (risperDAL) 0.25 MG tablet, Take 0.25 mg by mouth 2 (Two) Times a Day., Disp: , Rfl:   •  traZODone (DESYREL) 50 MG tablet, Take 50 mg by mouth Every Night., Disp: , Rfl:   •  vitamin D (ERGOCALCIFEROL) 1.25 MG (67682 UT) capsule capsule, Take 1 capsule by mouth 1 (One) Time Per Week., Disp: 12 capsule, Rfl: 3    Current Facility-Administered Medications:   •  cyanocobalamin injection 1,000 mcg, 1,000 mcg, Intramuscular, Q28 Days, Loc Sinclair MD, 1,000 mcg at 12/02/20 1122     Vitals:    03/10/21 1048   BP: 160/70   Pulse: 68   Temp: 97.1 °F (36.2 °C)   SpO2: 99%         03/10/21  1048   Weight: 50.8 kg (112 lb)     Patient's Body mass index is 19.84 kg/m². BMI is .      Physical Exam  Constitutional:       Appearance: Normal appearance. She is well-developed.   HENT:      Head: Normocephalic and atraumatic.      Right Ear: External ear normal.      Left Ear: External ear normal.   Eyes:      Extraocular Movements: Extraocular movements intact.      Conjunctiva/sclera: Conjunctivae normal.      Pupils: Pupils are equal, round, and reactive to light.   Neck:      Vascular: No carotid bruit.   Cardiovascular:      Rate and Rhythm: Normal rate and regular rhythm.      Pulses: Normal pulses.      Heart sounds: Normal heart sounds. No murmur. No gallop.    Pulmonary:      Effort: Pulmonary effort is normal.       Breath sounds: Normal breath sounds.   Musculoskeletal:         General: No swelling or tenderness. Normal range of motion.      Cervical back: Normal range of motion and neck supple. No rigidity.   Skin:     General: Skin is warm and dry.   Neurological:      General: No focal deficit present.      Mental Status: She is alert.   Psychiatric:         Mood and Affect: Mood normal.         Behavior: Behavior normal.               Assessment/Plan   Diagnoses and all orders for this visit:    1. Vitamin B12 deficient megaloblastic anemia (Primary)    2. Essential hypertension    3. Memory disorder    4. Vascular dementia without behavioral disturbance (CMS/HCC)      At today's visit I reviewed her patient's medications and signed the medicine reconciliation for [facility] the facility where she lives.  I am very pleased in general that she is gaining weight she is to continue on current medications as planned I had a lengthy discussion with patient's daughter who was in the room they seem pleased with her current status I will see her back in 3 months.

## 2021-03-12 RX ORDER — PANTOPRAZOLE SODIUM 40 MG/1
40 TABLET, DELAYED RELEASE ORAL DAILY
Qty: 30 TABLET | Refills: 2 | Status: SHIPPED | OUTPATIENT
Start: 2021-03-12 | End: 2021-12-10

## 2021-03-12 RX ORDER — LISINOPRIL 10 MG/1
10 TABLET ORAL DAILY
Qty: 30 TABLET | Refills: 2 | Status: SHIPPED | OUTPATIENT
Start: 2021-03-12 | End: 2021-09-27 | Stop reason: SDUPTHER

## 2021-03-12 RX ORDER — DILTIAZEM HYDROCHLORIDE 240 MG/1
240 CAPSULE, COATED, EXTENDED RELEASE ORAL DAILY
Qty: 30 CAPSULE | Refills: 2 | Status: SHIPPED | OUTPATIENT
Start: 2021-03-12 | End: 2021-09-17

## 2021-03-12 RX ORDER — TRAZODONE HYDROCHLORIDE 50 MG/1
50 TABLET ORAL NIGHTLY
Qty: 30 TABLET | Refills: 2 | Status: SHIPPED | OUTPATIENT
Start: 2021-03-12 | End: 2021-09-21

## 2021-03-12 RX ORDER — RISPERIDONE 0.25 MG/1
0.25 TABLET ORAL 2 TIMES DAILY
Qty: 60 TABLET | Refills: 2 | Status: SHIPPED | OUTPATIENT
Start: 2021-03-12 | End: 2021-07-29 | Stop reason: SDUPTHER

## 2021-03-12 NOTE — TELEPHONE ENCOUNTER
Lona with [facility] called to reorder all meds. I have attached all listed but she also stated MultiVitamin and B12 is needed. Also Esteradiol-this was 1st ordered from [facility]. All meds are are out/low with no refills. The protonix has 19 left but no refills. Lona's # [phone]

## 2021-03-16 ENCOUNTER — TELEPHONE (OUTPATIENT)
Dept: INTERNAL MEDICINE | Facility: CLINIC | Age: 86
End: 2021-03-16

## 2021-03-16 RX ORDER — DILTIAZEM HYDROCHLORIDE 240 MG/1
240 CAPSULE, COATED, EXTENDED RELEASE ORAL DAILY
Qty: 30 CAPSULE | Refills: 2 | Status: CANCELLED | OUTPATIENT
Start: 2021-03-16

## 2021-03-16 RX ORDER — TRAZODONE HYDROCHLORIDE 50 MG/1
50 TABLET ORAL NIGHTLY
Qty: 30 TABLET | Refills: 2 | Status: CANCELLED | OUTPATIENT
Start: 2021-03-16

## 2021-03-16 NOTE — TELEPHONE ENCOUNTER
Pharmacy Name: [facility] - [place], [place] - [address] - [phone]  - [phone] FX     Pharmacy representative name SARAH    Pharmacy representative phone number: [phone]    What medication are you calling in regards to: SEE BELOW          Additional notes: IN ADDITION TO MEDICATIONS LISTED ON PATIENT'S MEDICATION LIST:    ESTRADIOL    1 MG    1 DAILY  B12     500 MICROGRAMS  1 DAILY  THERA M VITAMINS  1 DAILY  SINGULAR   10 MG AT BEDTIME

## 2021-03-18 RX ORDER — MULTIPLE VITAMINS W/ MINERALS TAB 9MG-400MCG
1 TAB ORAL DAILY
Qty: 90 EACH | Refills: 3 | Status: SHIPPED | OUTPATIENT
Start: 2021-03-18 | End: 2021-06-24 | Stop reason: SDUPTHER

## 2021-03-18 RX ORDER — MONTELUKAST SODIUM 10 MG/1
10 TABLET ORAL NIGHTLY
Qty: 90 TABLET | Refills: 3 | Status: SHIPPED | OUTPATIENT
Start: 2021-03-18

## 2021-03-18 RX ORDER — CHOLECALCIFEROL (VITAMIN D3) 125 MCG
500 CAPSULE ORAL DAILY
COMMUNITY
End: 2021-03-18 | Stop reason: DRUGHIGH

## 2021-03-18 RX ORDER — MONTELUKAST SODIUM 10 MG/1
10 TABLET ORAL NIGHTLY
COMMUNITY
End: 2021-03-18 | Stop reason: SDUPTHER

## 2021-03-18 NOTE — TELEPHONE ENCOUNTER
RXS PENDED/ATTACHED    Added Singulair and B12 to her medication list, but called [facility] pharmacy and talked with Gracy and informed that Dr. Sinclair had discontinued Estradiol at her OV in December, so should not be taking this medication.  They are needing these refilled and will send, but advised it will not include Estradiol.

## 2021-03-24 RX ORDER — ERGOCALCIFEROL 1.25 MG/1
CAPSULE ORAL
Qty: 4 CAPSULE | Refills: 11 | Status: SHIPPED | OUTPATIENT
Start: 2021-03-24

## 2021-04-26 ENCOUNTER — TELEPHONE (OUTPATIENT)
Dept: INTERNAL MEDICINE | Facility: CLINIC | Age: 86
End: 2021-04-26

## 2021-04-26 NOTE — TELEPHONE ENCOUNTER
Caller: Alix Leblanc    Relationship: Emergency Contact    Best call back number: [phone]     What is the best time to reach you: ANYTIME    Who are you requesting to speak with (clinical staff, provider,  specific staff member): CLINICAL STAFF     Do you know the name of the person who called: ALIX LEBLANC     What was the call regarding: PATIENT'S DAUGHTER HAS SOME QUESTIONS ABOUT HER MEDICATION THAT'S BEEN CHANGED. PLEASE CALL AND ADVISE.     Do you require a callback: YES

## 2021-04-28 RX ORDER — HYDROXYZINE HYDROCHLORIDE 10 MG/1
10 TABLET, FILM COATED ORAL 3 TIMES DAILY PRN
COMMUNITY
End: 2021-12-07

## 2021-04-28 NOTE — TELEPHONE ENCOUNTER
Spoke with dgt yesterday and she reports that patient is doing well overall, but still has days where she will call her 20 times a day.  She is asking if this is r/t her medication or if this is normal behavior with a dementia patient and advised this is normal behavior with dementia.  She says that [facility] will give her a Hydroxyzine 10mg prn when she is really worked up over things and this seems to help.  She is asking if this is ok and told her yes, this is a mild sedative and if it helps, then ok, as she was changed from Lorazepam to Hydroxyzine when she was in the psych unit at [facility].  Will add this back to her medication list, as was inactivated at her last visit for some reason.

## 2021-05-25 ENCOUNTER — TELEPHONE (OUTPATIENT)
Dept: INTERNAL MEDICINE | Facility: CLINIC | Age: 86
End: 2021-05-25

## 2021-05-25 NOTE — TELEPHONE ENCOUNTER
We have a fax from [facility] asking for an order for patient to see in-house psychiatrist.  Is this ok?  [phone]

## 2021-05-25 NOTE — TELEPHONE ENCOUNTER
Called [facility] and talked with Nina and gave her the order, she says daughter had already signed consent for her to see in-house psych.

## 2021-06-15 RX ORDER — CITALOPRAM 20 MG/1
TABLET ORAL
Qty: 30 TABLET | Refills: 2 | Status: SHIPPED | OUTPATIENT
Start: 2021-06-15 | End: 2021-11-04 | Stop reason: SDUPTHER

## 2021-06-24 ENCOUNTER — OFFICE VISIT (OUTPATIENT)
Dept: INTERNAL MEDICINE | Facility: CLINIC | Age: 86
End: 2021-06-24

## 2021-06-24 VITALS
WEIGHT: 113 LBS | TEMPERATURE: 97.9 F | SYSTOLIC BLOOD PRESSURE: 136 MMHG | BODY MASS INDEX: 20.02 KG/M2 | OXYGEN SATURATION: 97 % | DIASTOLIC BLOOD PRESSURE: 60 MMHG | HEIGHT: 63 IN | HEART RATE: 67 BPM

## 2021-06-24 DIAGNOSIS — E78.2 MIXED HYPERLIPIDEMIA: ICD-10-CM

## 2021-06-24 DIAGNOSIS — Z23 NEED FOR VACCINATION WITH 13-POLYVALENT PNEUMOCOCCAL CONJUGATE VACCINE: ICD-10-CM

## 2021-06-24 DIAGNOSIS — F01.50 VASCULAR DEMENTIA WITHOUT BEHAVIORAL DISTURBANCE (HCC): Primary | ICD-10-CM

## 2021-06-24 DIAGNOSIS — E55.9 VITAMIN D DEFICIENCY: ICD-10-CM

## 2021-06-24 DIAGNOSIS — I10 ESSENTIAL HYPERTENSION: ICD-10-CM

## 2021-06-24 DIAGNOSIS — D53.1 VITAMIN B12 DEFICIENT MEGALOBLASTIC ANEMIA: ICD-10-CM

## 2021-06-24 DIAGNOSIS — I65.23 CAROTID OCCLUSION, BILATERAL: ICD-10-CM

## 2021-06-24 DIAGNOSIS — Z79.899 ENCOUNTER FOR LONG-TERM CURRENT USE OF MEDICATION: ICD-10-CM

## 2021-06-24 PROCEDURE — 90670 PCV13 VACCINE IM: CPT | Performed by: INTERNAL MEDICINE

## 2021-06-24 PROCEDURE — G0439 PPPS, SUBSEQ VISIT: HCPCS | Performed by: INTERNAL MEDICINE

## 2021-06-24 PROCEDURE — G0009 ADMIN PNEUMOCOCCAL VACCINE: HCPCS | Performed by: INTERNAL MEDICINE

## 2021-06-24 RX ORDER — ESTRADIOL 1 MG/1
1 TABLET ORAL DAILY
COMMUNITY
End: 2021-07-29

## 2021-06-24 RX ORDER — ASPIRIN 325 MG
600 TABLET ORAL DAILY PRN
COMMUNITY
End: 2021-12-07

## 2021-06-24 NOTE — PROGRESS NOTES
The ABCs of the Annual Wellness Visit  Subsequent Medicare Wellness Visit    Chief Complaint   Patient presents with   • Medicare Wellness-subsequent       Subjective   History of Present Illness:  Sabiha Sherwood is a 89 y.o. female who presents for a Subsequent Medicare Wellness Visit.    HEALTH RISK ASSESSMENT    Recent Hospitalizations:  Recently treated at the following:  Other: [facility]    Current Medical Providers:  Patient Care Team:  Loc Sinclair MD as PCP - General (Internal Medicine)  Steven Bassett MD as Consulting Physician (Gastroenterology)    Smoking Status:  Social History     Tobacco Use   Smoking Status Never Smoker   Smokeless Tobacco Never Used       Alcohol Consumption:  Social History     Substance and Sexual Activity   Alcohol Use No       Depression Screen:   PHQ-2/PHQ-9 Depression Screening 6/24/2021   Little interest or pleasure in doing things 0   Feeling down, depressed, or hopeless 0   Total Score 0       Fall Risk Screen:  MARTY Fall Risk Assessment was completed, and patient is at LOW risk for falls.Assessment completed on:6/24/2021    Health Habits and Functional and Cognitive Screening:  Functional & Cognitive Status 6/24/2021   Do you have difficulty preparing food and eating? Yes   Do you have difficulty bathing yourself, getting dressed or grooming yourself? No   Do you have difficulty using the toilet? No   Do you have difficulty moving around from place to place? Yes   Do you have trouble with steps or getting out of a bed or a chair? No   Current Diet Well Balanced Diet   Exercise (times per week) 4 times per week   Do you need help using the phone?  No   Are you deaf or do you have serious difficulty hearing?  No   Do you need help with transportation? Yes   Do you need help shopping? Yes   Do you need help preparing meals?  Yes   Do you need help with housework?  Yes   Do you need help with laundry? Yes   Do you need help taking your medications? Yes   Do you need  help managing money? Yes   Do you ever drive or ride in a car without wearing a seat belt? No   Have you felt unusual stress, anger or loneliness in the last month? No   Who do you live with? Other   If you need help, do you have trouble finding someone available to you? No   Have you been bothered in the last four weeks by sexual problems? No   Do you have difficulty concentrating, remembering or making decisions? Yes         Does the patient have evidence of cognitive impairment? Yes    Asprin use counseling:Does not need ASA (and currently is not on it)    Age-appropriate Screening Schedule:  Refer to the list below for future screening recommendations based on patient's age, sex and/or medical conditions. Orders for these recommended tests are listed in the plan section. The patient has been provided with a written plan.    Health Maintenance   Topic Date Due   • DXA SCAN  Never done   • TDAP/TD VACCINES (1 - Tdap) Never done   • ZOSTER VACCINE (1 of 2) Never done   • INFLUENZA VACCINE  08/01/2021   • LIPID PANEL  01/14/2022          The following portions of the patient's history were reviewed and updated as appropriate: allergies, current medications, past family history, past medical history, past social history, past surgical history and problem list.    Outpatient Medications Prior to Visit   Medication Sig Dispense Refill   • Calcium Carbonate-Vitamin D (CALTRATE 600+D PO) Take 1 tablet by mouth Daily.     • citalopram (CeleXA) 20 MG tablet TAKE ONE TABLET BY MOUTH EVERY DAY 30 tablet 2   • cloNIDine (CATAPRES) 0.1 MG tablet TAKE ONE TABLET BY MOUTH 2 TIMES A DAY 60 tablet 11   • Cyanocobalamin 1000 MCG sublingual tablet Place 1,000 mcg under the tongue Daily. 1 each 5   • dilTIAZem CD (CARDIZEM CD) 240 MG 24 hr capsule Take 1 capsule by mouth Daily. 30 capsule 2   • estradiol (ESTRACE) 1 MG tablet Take 1 mg by mouth Daily.     • guaiFENesin (Mucus Relief ER) 600 MG 12 hr tablet Take 600 mg by mouth Daily  As Needed for Cough.     • hydrOXYzine (ATARAX) 10 MG tablet Take 10 mg by mouth 3 (Three) Times a Day As Needed for Anxiety.     • lisinopril (PRINIVIL,ZESTRIL) 10 MG tablet Take 1 tablet by mouth Daily. 30 tablet 2   • melatonin 5 MG tablet tablet Take 5 mg by mouth Every Night.     • montelukast (SINGULAIR) 10 MG tablet Take 1 tablet by mouth Every Night. 90 tablet 3   • Multiple Vitamins-Minerals (MULTIPLE VITAMINS/WOMENS PO) Take 1 tablet by mouth Daily.     • nitroglycerin (Nitrostat) 0.4 MG SL tablet Place  under the tongue.     • pantoprazole (PROTONIX) 40 MG EC tablet Take 1 tablet by mouth Daily. 30 tablet 2   • potassium chloride 10 MEQ CR tablet Take 10 mEq by mouth Daily.     • risperiDONE (risperDAL) 0.25 MG tablet Take 1 tablet by mouth 2 (Two) Times a Day. 60 tablet 2   • traZODone (DESYREL) 50 MG tablet Take 1 tablet by mouth Every Night. 30 tablet 2   • vitamin D (ERGOCALCIFEROL) 1.25 MG (33991 UT) capsule capsule TAKE ONE CAPSULE BY MOUTH ONCE WEEKLY ON THURSDAY 4 capsule 11   • multivitamin with minerals (therapeutic multivitamin-minerals) tablet tablet Take 1 tablet by mouth Daily. 90 each 3     Facility-Administered Medications Prior to Visit   Medication Dose Route Frequency Provider Last Rate Last Admin   • cyanocobalamin injection 1,000 mcg  1,000 mcg Intramuscular Q28 Days Loc Sinclair MD   1,000 mcg at 12/02/20 1122       Patient Active Problem List   Diagnosis   • Weight loss   • Nausea and vomiting   • Abnormal CT of the abdomen   • Palpitations   • Hypertensive disorder   • Vitamin B12 deficient megaloblastic anemia   • CKD (chronic kidney disease) stage 3, GFR 30-59 ml/min (CMS/HCC)   • Diverticulitis   • Gastroesophageal reflux disease without esophagitis   • Hiatal hernia   • Mitral valve insufficiency   • Mixed hyperlipidemia   • Postural vertigo   • Severe malnutrition (CMS/HCC)   • Supraventricular tachycardia (CMS/HCC)   • Essential hypertension   • Memory disorder   • CAD  "(coronary artery disease)   • Carotid occlusion, left   • Goiter   • Gout of big toe   • Intrinsic asthma with exacerbation   • PAC (premature atrial contraction)   • Pulmonary nodule, right   • Arteriosclerosis of both carotid arteries   • Vitamin D deficiency   • Vascular dementia without behavioral disturbance (CMS/HCC)   • History of transient ischemic attack   • Dementia without behavioral disturbance (CMS/HCC)   • Hallucinations   • Psychosis (CMS/HCC)   • Aortic valve insufficiency       Advanced Care Planning:  ACP discussion was held with the patient during this visit. Patient has an advance directive in EMR which is still valid.     Review of Systems   Unable to perform ROS: Dementia       Compared to one year ago, the patient feels her physical health is the same.  Compared to one year ago, the patient feels her mental health is the same.    Reviewed chart for potential of high risk medication in the elderly: yes  Reviewed chart for potential of harmful drug interactions in the elderly:yes    Objective         Vitals:    06/24/21 1351   BP: 136/60   BP Location: Left arm   Patient Position: Sitting   Cuff Size: Adult   Pulse: 67   Temp: 97.9 °F (36.6 °C)   TempSrc: Temporal   SpO2: 97%   Weight: 51.3 kg (113 lb)   Height: 160 cm (63\")   PainSc: 0-No pain       Body mass index is 20.02 kg/m².  Discussed the patient's BMI with her. The BMI is in the acceptable range.    Physical Exam  Constitutional:       Appearance: She is well-developed.   HENT:      Head: Normocephalic and atraumatic.   Eyes:      Pupils: Pupils are equal, round, and reactive to light.   Cardiovascular:      Rate and Rhythm: Normal rate and regular rhythm.   Pulmonary:      Effort: Pulmonary effort is normal.      Breath sounds: Normal breath sounds.   Abdominal:      General: Bowel sounds are normal.      Palpations: Abdomen is soft.   Musculoskeletal:         General: Normal range of motion.      Cervical back: Normal range of motion " and neck supple.   Skin:     General: Skin is warm and dry.   Neurological:      Mental Status: She is alert and oriented to person, place, and time.   Psychiatric:         Behavior: Behavior normal.               Assessment/Plan   Medicare Risks and Personalized Health Plan  CMS Preventative Services Quick Reference  Advance Directive Discussion  Breast Cancer/Mammogram Screening  Immunizations Discussed/Encouraged (specific immunizations; Pneumococcal 23 and Shingrix )  Osteoporosis Risk    The above risks/problems have been discussed with the patient.  Pertinent information has been shared with the patient in the After Visit Summary.  Follow up plans and orders are seen below in the Assessment/Plan Section.    Diagnoses and all orders for this visit:    1. Vascular dementia without behavioral disturbance (CMS/[facility]) (Primary)    2. Vitamin B12 deficient megaloblastic anemia    3. Essential hypertension    4. Carotid occlusion, bilateral      Follow Up:  Return in about 3 months (around 9/24/2021).  I reviewed patient's medications today at some point she got back on estradiol melatonin she also was not taking aspirin and has known carotid occlusive disease.  Also checking to make sure that she is taking B12 sublingual.    An After Visit Summary and PPPS were given to the patient.

## 2021-06-25 ENCOUNTER — TELEPHONE (OUTPATIENT)
Dept: INTERNAL MEDICINE | Facility: CLINIC | Age: 86
End: 2021-06-25

## 2021-06-25 LAB
25(OH)D3+25(OH)D2 SERPL-MCNC: 66.6 NG/ML (ref 30–100)
ALBUMIN SERPL-MCNC: 4.2 G/DL (ref 3.5–5.2)
ALBUMIN/GLOB SERPL: 1.6 G/DL
ALP SERPL-CCNC: 50 U/L (ref 39–117)
ALT SERPL-CCNC: 14 U/L (ref 1–33)
APPEARANCE UR: CLEAR
AST SERPL-CCNC: 17 U/L (ref 1–32)
BACTERIA #/AREA URNS HPF: ABNORMAL /HPF
BASOPHILS # BLD AUTO: 0.02 10*3/MM3 (ref 0–0.2)
BASOPHILS NFR BLD AUTO: 0.2 % (ref 0–1.5)
BILIRUB SERPL-MCNC: 0.2 MG/DL (ref 0–1.2)
BILIRUB UR QL STRIP: NEGATIVE
BUN SERPL-MCNC: 20 MG/DL (ref 8–23)
BUN/CREAT SERPL: 16.9 (ref 7–25)
CALCIUM SERPL-MCNC: 9.2 MG/DL (ref 8.2–9.6)
CASTS URNS MICRO: ABNORMAL
CHLORIDE SERPL-SCNC: 97 MMOL/L (ref 98–107)
CHOLEST SERPL-MCNC: 182 MG/DL (ref 0–200)
CO2 SERPL-SCNC: 26.2 MMOL/L (ref 22–29)
COLOR UR: YELLOW
CREAT SERPL-MCNC: 1.18 MG/DL (ref 0.57–1)
EOSINOPHIL # BLD AUTO: 0.1 10*3/MM3 (ref 0–0.4)
EOSINOPHIL NFR BLD AUTO: 1.2 % (ref 0.3–6.2)
EPI CELLS #/AREA URNS HPF: ABNORMAL /HPF
ERYTHROCYTE [DISTWIDTH] IN BLOOD BY AUTOMATED COUNT: 12.7 % (ref 12.3–15.4)
GLOBULIN SER CALC-MCNC: 2.6 GM/DL
GLUCOSE SERPL-MCNC: 90 MG/DL (ref 65–99)
GLUCOSE UR QL: NEGATIVE
HCT VFR BLD AUTO: 36.3 % (ref 34–46.6)
HDLC SERPL-MCNC: 71 MG/DL (ref 40–60)
HGB BLD-MCNC: 12.1 G/DL (ref 12–15.9)
HGB UR QL STRIP: NEGATIVE
IMM GRANULOCYTES # BLD AUTO: 0.02 10*3/MM3 (ref 0–0.05)
IMM GRANULOCYTES NFR BLD AUTO: 0.2 % (ref 0–0.5)
KETONES UR QL STRIP: NEGATIVE
LDLC SERPL CALC-MCNC: 91 MG/DL (ref 0–100)
LEUKOCYTE ESTERASE UR QL STRIP: NEGATIVE
LYMPHOCYTES # BLD AUTO: 1.26 10*3/MM3 (ref 0.7–3.1)
LYMPHOCYTES NFR BLD AUTO: 15.4 % (ref 19.6–45.3)
MCH RBC QN AUTO: 31.8 PG (ref 26.6–33)
MCHC RBC AUTO-ENTMCNC: 33.3 G/DL (ref 31.5–35.7)
MCV RBC AUTO: 95.5 FL (ref 79–97)
MONOCYTES # BLD AUTO: 0.54 10*3/MM3 (ref 0.1–0.9)
MONOCYTES NFR BLD AUTO: 6.6 % (ref 5–12)
NEUTROPHILS # BLD AUTO: 6.26 10*3/MM3 (ref 1.7–7)
NEUTROPHILS NFR BLD AUTO: 76.4 % (ref 42.7–76)
NITRITE UR QL STRIP: NEGATIVE
NRBC BLD AUTO-RTO: 0 /100 WBC (ref 0–0.2)
PH UR STRIP: 6 [PH] (ref 5–8)
PLATELET # BLD AUTO: 190 10*3/MM3 (ref 140–450)
POTASSIUM SERPL-SCNC: 5 MMOL/L (ref 3.5–5.2)
PROT SERPL-MCNC: 6.8 G/DL (ref 6–8.5)
PROT UR QL STRIP: NEGATIVE
RBC # BLD AUTO: 3.8 10*6/MM3 (ref 3.77–5.28)
RBC #/AREA URNS HPF: ABNORMAL /HPF
SODIUM SERPL-SCNC: 134 MMOL/L (ref 136–145)
SP GR UR: 1.01 (ref 1–1.03)
TRIGL SERPL-MCNC: 114 MG/DL (ref 0–150)
TSH SERPL DL<=0.005 MIU/L-ACNC: 2.54 UIU/ML (ref 0.27–4.2)
URATE SERPL-MCNC: 5.4 MG/DL (ref 2.4–5.7)
UROBILINOGEN UR STRIP-MCNC: NORMAL MG/DL
VLDLC SERPL CALC-MCNC: 20 MG/DL (ref 5–40)
WBC # BLD AUTO: 8.2 10*3/MM3 (ref 3.4–10.8)
WBC #/AREA URNS HPF: ABNORMAL /HPF

## 2021-06-25 NOTE — TELEPHONE ENCOUNTER
Caller: STACIE    Relationship: senior care    Best call back number: [phone]    What medication are you requesting: HEADACHE MEDICATION    What are your current symptoms: HEADACHE, WANTS TYLENOL, CONFUSION    If a prescription is needed, what is your preferred pharmacy and phone number:    \[facility]\"" [facility]ARM[facility]Y - [place], [place] - [address] - [phone]  - [phone] FX  [phone]

## 2021-06-25 NOTE — TELEPHONE ENCOUNTER
----- Message from Loc Sinclair MD sent at 6/25/2021  8:34 AM CDT -----  Results are normal, call daughter

## 2021-07-21 ENCOUNTER — TELEPHONE (OUTPATIENT)
Dept: INTERNAL MEDICINE | Facility: CLINIC | Age: 86
End: 2021-07-21

## 2021-07-21 NOTE — TELEPHONE ENCOUNTER
She would have to bring her back in to the office to discuss her situation, as we have been over this multiple times in the past.

## 2021-07-21 NOTE — TELEPHONE ENCOUNTER
Caller: Alix Jama    Relationship: Emergency Contact    Best call back number: [phone] or  [phone]    What medication are you requesting: unsure- possibly lorazepam       What are your current symptoms:  Anxiety and nervousness     How long have you been experiencing symptoms: awhile     Have you had these symptoms before:    [x] Yes  [] No    Have you been treated for these symptoms before:   [x] Yes  [] No    If a prescription is needed, what is your preferred pharmacy and phone number:      Additional notes: Daughter called and stated that Sabiha just seems very anxious. She states its like she was when she was at home. She said that she calls her daily several times, in excess of 10x daily  And keeps telling Alix that she has a lot of anxiety and nervousness.  When Alix goes to visit her, which is frequently, Sabiha seems to calm down some, but still shows signs of being anxious.     She is wondering if taking Sabiha off of the estradol has caused any of these symptoms.       Alix said that Sabiha was previously prescribed Lorazepam 0.5mg and it seemed to help in the past - she is unsure if this could be prescribed but is concerned about taking it with all of her other medications.    Sabiha states that she is not sleeping well - but per the nurses at the facility there are no complaints from Sabiha.    Sabiha states that she feels isolated but Alix is positive that Sabiha is given ample opportunity to do activities and such in the facility and she visits regularly.     Please call Alix back to address concerns.

## 2021-07-29 ENCOUNTER — OFFICE VISIT (OUTPATIENT)
Dept: INTERNAL MEDICINE | Facility: CLINIC | Age: 86
End: 2021-07-29

## 2021-07-29 VITALS
WEIGHT: 107 LBS | HEART RATE: 96 BPM | OXYGEN SATURATION: 100 % | TEMPERATURE: 97.1 F | HEIGHT: 63 IN | SYSTOLIC BLOOD PRESSURE: 130 MMHG | DIASTOLIC BLOOD PRESSURE: 62 MMHG | BODY MASS INDEX: 18.96 KG/M2

## 2021-07-29 DIAGNOSIS — F01.50 VASCULAR DEMENTIA WITHOUT BEHAVIORAL DISTURBANCE (HCC): Primary | ICD-10-CM

## 2021-07-29 DIAGNOSIS — R63.4 WEIGHT LOSS: ICD-10-CM

## 2021-07-29 DIAGNOSIS — I10 ESSENTIAL HYPERTENSION: ICD-10-CM

## 2021-07-29 PROCEDURE — 99214 OFFICE O/P EST MOD 30 MIN: CPT | Performed by: INTERNAL MEDICINE

## 2021-07-29 RX ORDER — ESTRADIOL 0.5 MG/1
0.5 TABLET ORAL DAILY
Qty: 90 TABLET | Refills: 3 | Status: SHIPPED | OUTPATIENT
Start: 2021-07-29

## 2021-07-29 RX ORDER — RISPERIDONE 0.25 MG/1
0.25 TABLET ORAL 3 TIMES DAILY
Qty: 90 TABLET | Refills: 2 | Status: SHIPPED | OUTPATIENT
Start: 2021-07-29 | End: 2021-12-03

## 2021-07-29 NOTE — PROGRESS NOTES
Subjective   Sabiha Sherwood is a 89 y.o. female.   Chief Complaint   Patient presents with   • Anxiety     daughter is concerned about the atarax it works for the anxiety but makes her sleepy. also wonders if coming off the hormones is causing her to be more anxious.  pt states she feels anxious all the time now  and states pt is very unhappy about her living situation        History of Present Illness patient and her daughter are present today for evaluation.  The daughter states that the mother sometimes calls her multiple times sometimes in excess of 10 times anxious and wants to go home feels like she is a burden to everyone is even mentioned praying that God would take her.  In the office today she carries on very good conversation there is no mention of wanting to die.  She was hospitalized within the last year or so and placed on some additional medications she seemed to respond to those medications.    The following portions of the patient's history were reviewed and updated as appropriate: allergies, current medications, past family history, past medical history, past social history, past surgical history and problem list.    Review of Systems   Constitutional: Negative for activity change, appetite change, fatigue, fever, unexpected weight gain and unexpected weight loss.   HENT: Negative for swollen glands, trouble swallowing and voice change.    Eyes: Negative for blurred vision and visual disturbance.   Respiratory: Negative for cough and shortness of breath.    Cardiovascular: Negative for chest pain, palpitations and leg swelling.   Gastrointestinal: Negative for abdominal pain, constipation, diarrhea, nausea, vomiting and indigestion.   Endocrine: Negative for cold intolerance, heat intolerance, polydipsia and polyphagia.   Genitourinary: Negative for dysuria and frequency.   Musculoskeletal: Negative for arthralgias, back pain, joint swelling and neck pain.   Skin: Negative for color change, rash  and skin lesions.   Neurological: Negative for dizziness, weakness, headache, memory problem and confusion.   Hematological: Does not bruise/bleed easily.   Psychiatric/Behavioral: Negative for agitation, hallucinations and suicidal ideas. The patient is not nervous/anxious.        Objective   Past Medical History:   Diagnosis Date   • Acute renal failure syndrome (CMS/HCC)    • Arrhythmia    • Diverticulitis    • Hiatal hernia    • Hypertension    • UTI (urinary tract infection)    • Vertigo       Past Surgical History:   Procedure Laterality Date   • CHOLECYSTECTOMY     • COLONOSCOPY N/A 5/10/2019    Procedure: COLONOSCOPY WITH ANESTHESIA;  Surgeon: Steven Bassett MD;  Location: [facility] ENDOSCOPY;  Service: Gastroenterology   • ENDOSCOPY N/A 5/10/2019    Procedure: ESOPHAGOGASTRODUODENOSCOPY WITH ANESTHESIA;  Surgeon: Steven Bassett MD;  Location: [facility] ENDOSCOPY;  Service: Gastroenterology   • HYSTERECTOMY          Current Outpatient Medications:   •  citalopram (CeleXA) 20 MG tablet, TAKE ONE TABLET BY MOUTH EVERY DAY, Disp: 30 tablet, Rfl: 2  •  cloNIDine (CATAPRES) 0.1 MG tablet, TAKE ONE TABLET BY MOUTH 2 TIMES A DAY, Disp: 60 tablet, Rfl: 11  •  Cyanocobalamin 1000 MCG sublingual tablet, Place 1,000 mcg under the tongue Daily., Disp: 1 each, Rfl: 5  •  dilTIAZem CD (CARDIZEM CD) 240 MG 24 hr capsule, Take 1 capsule by mouth Daily., Disp: 30 capsule, Rfl: 2  •  guaiFENesin (Mucus Relief ER) 600 MG 12 hr tablet, Take 600 mg by mouth Daily As Needed for Cough., Disp: , Rfl:   •  hydrOXYzine (ATARAX) 10 MG tablet, Take 10 mg by mouth 3 (Three) Times a Day As Needed for Anxiety., Disp: , Rfl:   •  lisinopril (PRINIVIL,ZESTRIL) 10 MG tablet, Take 1 tablet by mouth Daily., Disp: 30 tablet, Rfl: 2  •  montelukast (SINGULAIR) 10 MG tablet, Take 1 tablet by mouth Every Night., Disp: 90 tablet, Rfl: 3  •  nitroglycerin (Nitrostat) 0.4 MG SL tablet, Place  under the tongue., Disp: , Rfl:   •  pantoprazole  (PROTONIX) 40 MG EC tablet, Take 1 tablet by mouth Daily., Disp: 30 tablet, Rfl: 2  •  potassium chloride 10 MEQ CR tablet, Take 10 mEq by mouth Daily., Disp: , Rfl:   •  risperiDONE (risperDAL) 0.25 MG tablet, Take 1 tablet by mouth 3 (Three) Times a Day., Disp: 90 tablet, Rfl: 2  •  traZODone (DESYREL) 50 MG tablet, Take 1 tablet by mouth Every Night., Disp: 30 tablet, Rfl: 2  •  vitamin D (ERGOCALCIFEROL) 1.25 MG (35805 UT) capsule capsule, TAKE ONE CAPSULE BY MOUTH ONCE WEEKLY ON THURSDAY, Disp: 4 capsule, Rfl: 11  •  estradiol (ESTRACE) 0.5 MG tablet, Take 1 tablet by mouth Daily., Disp: 90 tablet, Rfl: 3    Current Facility-Administered Medications:   •  cyanocobalamin injection 1,000 mcg, 1,000 mcg, Intramuscular, Q28 Days, Loc Sinclair MD, 1,000 mcg at 12/02/20 1122     Vitals:    07/29/21 1328   BP: 130/62   Pulse: 96   Temp: 97.1 °F (36.2 °C)   SpO2: 100%         07/29/21  1328   Weight: 48.5 kg (107 lb)         Physical Exam  Constitutional:       Appearance: She is well-developed.   HENT:      Head: Normocephalic and atraumatic.   Eyes:      Pupils: Pupils are equal, round, and reactive to light.   Cardiovascular:      Rate and Rhythm: Normal rate and regular rhythm.   Pulmonary:      Effort: Pulmonary effort is normal.      Breath sounds: Normal breath sounds.   Abdominal:      General: Bowel sounds are normal.      Palpations: Abdomen is soft.   Musculoskeletal:         General: Normal range of motion.      Cervical back: Normal range of motion and neck supple.   Skin:     General: Skin is warm and dry.   Neurological:      Mental Status: She is alert and oriented to person, place, and time.   Psychiatric:         Behavior: Behavior normal.               Assessment/Plan   Diagnoses and all orders for this visit:    1. Vascular dementia without behavioral disturbance (CMS/HCC) (Primary)    2. Essential hypertension    3. Weight loss    Other orders  -     risperiDONE (risperDAL) 0.25 MG tablet;  Take 1 tablet by mouth 3 (Three) Times a Day.  Dispense: 90 tablet; Refill: 2  -     estradiol (ESTRACE) 0.5 MG tablet; Take 1 tablet by mouth Daily.  Dispense: 90 tablet; Refill: 3      At this point I am going ahead and putting patient back on her Estrace for 2 reasons 1 she is lost weight since coming off of the Estrace No. 2 the daughter feels like that has something to do with the her feelings and her being somewhat depressed.  We did talk about multiple medications that could be changed including the Celexa trazodone however what were going to try induced increase the respite all 2.25 mg 3 times daily and see how she responds to that.  I have her scheduled for follow-up.

## 2021-08-04 ENCOUNTER — TELEPHONE (OUTPATIENT)
Dept: INTERNAL MEDICINE | Facility: CLINIC | Age: 86
End: 2021-08-04

## 2021-08-04 NOTE — TELEPHONE ENCOUNTER
[facility] called to repost that Ms Ogden does not feel well.  There Is no specific complaint or pain location, just doesn't feel well.  [facility] tested her and was negative  [facility]lana stated that they had to notify dr office about this

## 2021-08-18 ENCOUNTER — TELEPHONE (OUTPATIENT)
Dept: INTERNAL MEDICINE | Facility: CLINIC | Age: 86
End: 2021-08-18

## 2021-08-18 NOTE — TELEPHONE ENCOUNTER
Daughter called to verify that the Risperidone was still .25 MG I verified as of 7/29 prescription shows that.

## 2021-08-23 NOTE — TELEPHONE ENCOUNTER
Caller: ARTEMIO    Relationship: retirement    Best call back number: [phone]    What medication are you requesting: ASPRIN 81MG    If a prescription is needed, what is your preferred pharmacy and phone number: [facility] - [place] - [address] - [phone] PH - [phone] FX

## 2021-08-24 RX ORDER — ASPIRIN 81 MG/1
81 TABLET ORAL DAILY
Qty: 30 TABLET | Refills: 11 | Status: SHIPPED | OUTPATIENT
Start: 2021-08-24

## 2021-08-24 NOTE — TELEPHONE ENCOUNTER
LM brandon Manzanares to call me back - need to verify if this is just a refill request or a new request, as ASA is not on our medication list, nor is it on the medication lists that the facility sends the provider to sign periodically.

## 2021-08-24 NOTE — TELEPHONE ENCOUNTER
Per Glenna, pt has been taking ASA 81mg daily all along, but paying out of pocket for it, so they are asking for Rx, so they can have a label to dispense it to her.  Rx attached/pended.

## 2021-09-17 RX ORDER — DILTIAZEM HYDROCHLORIDE 240 MG/1
CAPSULE, COATED, EXTENDED RELEASE ORAL
Qty: 30 CAPSULE | Refills: 2 | Status: SHIPPED | OUTPATIENT
Start: 2021-09-17 | End: 2021-12-10

## 2021-09-21 RX ORDER — TRAZODONE HYDROCHLORIDE 50 MG/1
TABLET ORAL
Qty: 30 TABLET | Refills: 2 | Status: SHIPPED | OUTPATIENT
Start: 2021-09-21 | End: 2021-09-30 | Stop reason: SDUPTHER

## 2021-09-23 ENCOUNTER — OFFICE VISIT (OUTPATIENT)
Dept: INTERNAL MEDICINE | Facility: CLINIC | Age: 86
End: 2021-09-23

## 2021-09-23 VITALS
SYSTOLIC BLOOD PRESSURE: 142 MMHG | TEMPERATURE: 97.9 F | HEIGHT: 63 IN | DIASTOLIC BLOOD PRESSURE: 62 MMHG | HEART RATE: 71 BPM | BODY MASS INDEX: 18.78 KG/M2 | WEIGHT: 106 LBS | OXYGEN SATURATION: 98 %

## 2021-09-23 DIAGNOSIS — F01.50 VASCULAR DEMENTIA WITHOUT BEHAVIORAL DISTURBANCE (HCC): Primary | ICD-10-CM

## 2021-09-23 DIAGNOSIS — I10 ESSENTIAL HYPERTENSION: ICD-10-CM

## 2021-09-23 DIAGNOSIS — D53.1 VITAMIN B12 DEFICIENT MEGALOBLASTIC ANEMIA: ICD-10-CM

## 2021-09-23 PROCEDURE — 99213 OFFICE O/P EST LOW 20 MIN: CPT | Performed by: INTERNAL MEDICINE

## 2021-09-23 NOTE — PROGRESS NOTES
Subjective   Sabiha Sherwood is a 89 y.o. female.   Chief Complaint   Patient presents with   • Hypertension     follow up        History of Present Illness this is a follow-up visit for patient's hypertension she also has some dementia and has had some agitation.  She is on medication right now the daughter tells me the only major thing is that she frequently calls her about various things think this is strictly memory related.    The following portions of the patient's history were reviewed and updated as appropriate: allergies, current medications, past family history, past medical history, past social history, past surgical history and problem list.    Review of Systems   Constitutional: Negative for activity change, appetite change, fatigue, fever, unexpected weight gain and unexpected weight loss.   HENT: Negative for swollen glands, trouble swallowing and voice change.    Eyes: Negative for blurred vision and visual disturbance.   Respiratory: Negative for cough and shortness of breath.    Cardiovascular: Negative for chest pain, palpitations and leg swelling.   Gastrointestinal: Negative for abdominal pain, constipation, diarrhea, nausea, vomiting and indigestion.   Endocrine: Negative for cold intolerance, heat intolerance, polydipsia and polyphagia.   Genitourinary: Negative for dysuria and frequency.   Musculoskeletal: Negative for arthralgias, back pain, joint swelling and neck pain.   Skin: Negative for color change, rash and skin lesions.   Neurological: Negative for dizziness, weakness, headache, memory problem and confusion.   Hematological: Does not bruise/bleed easily.   Psychiatric/Behavioral: Negative for agitation, hallucinations and suicidal ideas. The patient is not nervous/anxious.        Objective   Past Medical History:   Diagnosis Date   • Acute renal failure syndrome (CMS/HCC)    • Arrhythmia    • Diverticulitis    • Hiatal hernia    • Hypertension    • UTI (urinary tract infection)    •  Vertigo       Past Surgical History:   Procedure Laterality Date   • CHOLECYSTECTOMY     • COLONOSCOPY N/A 5/10/2019    Procedure: COLONOSCOPY WITH ANESTHESIA;  Surgeon: Steven Bassett MD;  Location: [facility] ENDOSCOPY;  Service: Gastroenterology   • ENDOSCOPY N/A 5/10/2019    Procedure: ESOPHAGOGASTRODUODENOSCOPY WITH ANESTHESIA;  Surgeon: Steven Bassett MD;  Location: [facility] ENDOSCOPY;  Service: Gastroenterology   • HYSTERECTOMY          Current Outpatient Medications:   •  citalopram (CeleXA) 20 MG tablet, TAKE ONE TABLET BY MOUTH EVERY DAY, Disp: 30 tablet, Rfl: 2  •  cloNIDine (CATAPRES) 0.1 MG tablet, TAKE ONE TABLET BY MOUTH 2 TIMES A DAY, Disp: 60 tablet, Rfl: 11  •  Cyanocobalamin 1000 MCG sublingual tablet, Place 1,000 mcg under the tongue Daily., Disp: 1 each, Rfl: 5  •  dilTIAZem CD (CARDIZEM CD) 240 MG 24 hr capsule, TAKE ONE CAPSULE BY MOUTH EVERY DAY, Disp: 30 capsule, Rfl: 2  •  estradiol (ESTRACE) 0.5 MG tablet, Take 1 tablet by mouth Daily., Disp: 90 tablet, Rfl: 3  •  guaiFENesin (Mucus Relief ER) 600 MG 12 hr tablet, Take 600 mg by mouth Daily As Needed for Cough., Disp: , Rfl:   •  hydrOXYzine (ATARAX) 10 MG tablet, Take 10 mg by mouth 3 (Three) Times a Day As Needed for Anxiety., Disp: , Rfl:   •  lisinopril (PRINIVIL,ZESTRIL) 10 MG tablet, Take 1 tablet by mouth Daily., Disp: 30 tablet, Rfl: 2  •  montelukast (SINGULAIR) 10 MG tablet, Take 1 tablet by mouth Every Night., Disp: 90 tablet, Rfl: 3  •  nitroglycerin (Nitrostat) 0.4 MG SL tablet, Place  under the tongue., Disp: , Rfl:   •  pantoprazole (PROTONIX) 40 MG EC tablet, Take 1 tablet by mouth Daily., Disp: 30 tablet, Rfl: 2  •  potassium chloride 10 MEQ CR tablet, Take 10 mEq by mouth Daily., Disp: , Rfl:   •  risperiDONE (risperDAL) 0.25 MG tablet, Take 1 tablet by mouth 3 (Three) Times a Day., Disp: 90 tablet, Rfl: 2  •  traZODone (DESYREL) 50 MG tablet, TAKE ONE TABLET BY MOUTH EVERY NIGHT, Disp: 30 tablet, Rfl: 2  •  vitamin D  (ERGOCALCIFEROL) 1.25 MG (57807 UT) capsule capsule, TAKE ONE CAPSULE BY MOUTH ONCE WEEKLY ON THURSDAY, Disp: 4 capsule, Rfl: 11  •  aspirin (aspirin) 81 MG EC tablet, Take 1 tablet by mouth Daily., Disp: 30 tablet, Rfl: 11    Current Facility-Administered Medications:   •  cyanocobalamin injection 1,000 mcg, 1,000 mcg, Intramuscular, Q28 Days, Loc Sinclair MD, 1,000 mcg at 12/02/20 1122      Vitals:    09/23/21 1306   BP: 142/62   Pulse: 71   Temp: 97.9 °F (36.6 °C)   SpO2: 98%         09/23/21  1306   Weight: 48.1 kg (106 lb)       Body mass index is 18.78 kg/m².    Physical Exam  Constitutional:       Appearance: She is well-developed.   HENT:      Head: Normocephalic and atraumatic.   Eyes:      Pupils: Pupils are equal, round, and reactive to light.   Cardiovascular:      Rate and Rhythm: Normal rate and regular rhythm.   Pulmonary:      Effort: Pulmonary effort is normal.      Breath sounds: Normal breath sounds.   Abdominal:      General: Bowel sounds are normal.      Palpations: Abdomen is soft.   Musculoskeletal:         General: Normal range of motion.      Cervical back: Normal range of motion and neck supple.   Skin:     General: Skin is warm and dry.   Neurological:      Mental Status: She is alert and oriented to person, place, and time.   Psychiatric:         Behavior: Behavior normal.               Assessment/Plan   Diagnoses and all orders for this visit:    1. Vascular dementia without behavioral disturbance (CMS/HCC) (Primary)    2. Essential hypertension    3. Vitamin B12 deficient megaloblastic anemia        This point I think patient's dementia is stable unfortunately she calls her daughter frequently which has to do with her inability to remember.  I cannot really change that scenario.  She should continue her B12 she should continue antihypertensive therapy, see her back in 3 months for follow-up.

## 2021-09-24 ENCOUNTER — TELEPHONE (OUTPATIENT)
Dept: INTERNAL MEDICINE | Facility: CLINIC | Age: 86
End: 2021-09-24

## 2021-09-24 NOTE — TELEPHONE ENCOUNTER
assisted living called stating the daughter is now worried that patients anxiety medication is not working well enough.  Pt was just seen on 09/23/2021 and there was no mention of this.

## 2021-09-24 NOTE — TELEPHONE ENCOUNTER
PATIENTS DAUGHTER CALLED IN TO LET ELLEN KNOW THAT PATIENT IS TILL TAKING THE ASPRIN 81 MG    GOOD CALL BACK   [phone]

## 2021-09-27 ENCOUNTER — TELEPHONE (OUTPATIENT)
Dept: INTERNAL MEDICINE | Facility: CLINIC | Age: 86
End: 2021-09-27

## 2021-09-27 RX ORDER — LISINOPRIL 10 MG/1
10 TABLET ORAL DAILY
Qty: 90 TABLET | Refills: 3 | Status: SHIPPED | OUTPATIENT
Start: 2021-09-27

## 2021-09-27 NOTE — TELEPHONE ENCOUNTER
assisted living called stating the daughter is now worried that patients anxiety medication is not working well enough.  Pt was just seen on 09/23/2021 and there was no mention of this.    Please advise

## 2021-09-27 NOTE — TELEPHONE ENCOUNTER
Glenna @ [facility] advised we will send in refill, verified pharmacy as [facility].

## 2021-09-27 NOTE — TELEPHONE ENCOUNTER
Caller: SABIHA DARDEN    Relationship to patient: Nursing Home    Best call back number: [phone]    Patient is needing: NURSE FROM [facility] CALLED. PATIENT IS NEEDING A NEW SCRIPT FOR HER lisinopril (PRINIVIL,ZESTRIL) 10 MG tablet. PLEASE ADVISE.

## 2021-09-28 NOTE — TELEPHONE ENCOUNTER
"Called dgt and she says assisted living says she has days where she cries and wants to leave and just says that she is just \"real nervous\".  Dgt wasn't aware they were having problems with her, until recently.  Dgt says she does call her, asking to be taken \"home\", says seems to be worse on the weekends when they don't have activities planned.  She is aware that medication may not be the answer.  "

## 2021-09-30 RX ORDER — TRAZODONE HYDROCHLORIDE 50 MG/1
75 TABLET ORAL
Qty: 45 TABLET | Refills: 5 | Status: SHIPPED | OUTPATIENT
Start: 2021-09-30 | End: 2021-12-17

## 2021-09-30 NOTE — TELEPHONE ENCOUNTER
Rx pended/attached.  Dgt advised that we will try increasing Trazodone to 1 1/2 at bedtime.  She instructed me to contact [facility] and she will contact S[facility]lana to inform.

## 2021-10-19 ENCOUNTER — TELEPHONE (OUTPATIENT)
Dept: INTERNAL MEDICINE | Facility: CLINIC | Age: 86
End: 2021-10-19

## 2021-10-19 NOTE — TELEPHONE ENCOUNTER
Caller: Alix Jama    Relationship: Emergency Contact    Best call back number: [phone]    Who are you requesting to speak with (clinical staff, provider,  specific staff member): PROVIDER    What was the call regarding:PATIENT HAS RECEIVED MODERNA VACCINE DURING THE SUMMER. HAS SINCE LOST 5LBS AND DAUGHTER IS WANTING PROVIDERS INPUT ON IF PATIENT SHOULD RECEIVE THE BOOSTER.

## 2021-11-03 ENCOUNTER — TELEPHONE (OUTPATIENT)
Dept: INTERNAL MEDICINE | Facility: CLINIC | Age: 86
End: 2021-11-03

## 2021-11-03 NOTE — TELEPHONE ENCOUNTER
Caller: Alix Jama    Relationship: Emergency Contact    Best call back number: [phone] (M)    What is the best time to reach you: ANYTIME     Who are you requesting to speak with (clinical staff, provider,  specific staff member): CLINICAL     Do you know the name of the person who called:CLINICAL     What was the call regarding: STATES SHE SEEMS DEPRESSED AND DOES NOT WANT TO COME OUT OF HER ROOM OR DO ANYTHING AND STATES SHE IS NOT EATING       Do you require a callback: YES       [phone]

## 2021-11-04 RX ORDER — CITALOPRAM 20 MG/1
20 TABLET ORAL 2 TIMES DAILY
Qty: 60 TABLET | Refills: 2 | Status: SHIPPED | OUTPATIENT
Start: 2021-11-04 | End: 2021-11-11

## 2021-11-04 NOTE — TELEPHONE ENCOUNTER
Prudencio informed and she will inform Svetlana.  Will send new Rx to [facility] with new directions.

## 2021-11-10 ENCOUNTER — TELEPHONE (OUTPATIENT)
Dept: INTERNAL MEDICINE | Facility: CLINIC | Age: 86
End: 2021-11-10

## 2021-11-10 NOTE — TELEPHONE ENCOUNTER
Psychologist would be of little if any benefit they may want to take her over to [facility] for emotional wellness she may need to be admitted after evaluation.

## 2021-11-10 NOTE — TELEPHONE ENCOUNTER
Caller: ZHANNA     Relationship:     Best call back [phone]    What is the best time to reach you: 8 TO 3PM Monday THROUGH FRIDAY     Who are you requesting to speak with (clinical staff, provider,  specific staff member): PROVIDER    What was the call regarding: PATIENT IS NOT EATING MUCH, SHE IS NOT COMING OUT OF HER ROOM, SHE TRIED TO LEAVE THE FACILITY YESTERDAY AND SHE HAS HAD A LOT OF WEIGHT LOSS. ZHANNA THE LPN FROM THE FACILITY IS WANTING TO DISCUSS WITH DR. JACKSON AND SEE IF HE WANTS TO CHANGE THE MEDICATION TH PATIENT IS ON OR WANTING TO REFER HER TO AN PHYSIOLOGIST      Do you require a callback: YES

## 2021-11-10 NOTE — TELEPHONE ENCOUNTER
Please see message below from [facility].  I know the discussion we have had with dgt in the past, in that you cannot medicate her to sedate her and if her condition has advanced, would need to move to NH.  You recently increased her Celexa to bid.  Please advise.

## 2021-11-11 RX ORDER — CITALOPRAM 20 MG/1
TABLET ORAL
Qty: 30 TABLET | Refills: 2 | Status: SHIPPED | OUTPATIENT
Start: 2021-11-11

## 2021-11-11 NOTE — TELEPHONE ENCOUNTER
Prudencio called back and we discussed.  She agrees that a psychologist will not be of any benefit.  She does not feel it is necessary to take her to [facility].  She has been to [facility] and [facility] for emotional wellness in the past, but doesn't feel it is necessary now.  She understands that if her behavior continues to deteriorate,  she will have to consider NH placement.

## 2021-11-22 RX ORDER — ASPIRIN 81 MG
TABLET, DELAYED RELEASE (ENTERIC COATED) ORAL
Qty: 90 TABLET | Refills: 0 | Status: SHIPPED | OUTPATIENT
Start: 2021-11-22

## 2021-12-02 ENCOUNTER — TELEPHONE (OUTPATIENT)
Dept: INTERNAL MEDICINE | Facility: CLINIC | Age: 86
End: 2021-12-02

## 2021-12-02 NOTE — TELEPHONE ENCOUNTER
We have had multiple conversation with assisted living facility and with daughter, explaining that Dr. Sinclair cannot medicate for this and if behavior progresses to the point that they cannot control her, she will need to be moved to a NH.

## 2021-12-02 NOTE — TELEPHONE ENCOUNTER
Caller: Thelma/Assisted Living Facility    Relationship to patient:     Best call back number: [phone]    Patient has been having some extreme anxiety issues. She has tried leaving the facility and does not have an appetite. They are requesting that something be prescribed to help with this. Please advise.

## 2021-12-03 RX ORDER — RISPERIDONE 0.25 MG/1
TABLET ORAL
Qty: 90 TABLET | Refills: 2 | Status: SHIPPED | OUTPATIENT
Start: 2021-12-03

## 2021-12-06 ENCOUNTER — TELEPHONE (OUTPATIENT)
Dept: INTERNAL MEDICINE | Facility: CLINIC | Age: 86
End: 2021-12-06

## 2021-12-06 NOTE — TELEPHONE ENCOUNTER
Offer to move up her mother's 12/16 appt.  This has been discussed mutliple times with daughter and she is aware that if her condition progresses to beyond the facilities ability to care for her, she will have to be moved to a NH.

## 2021-12-06 NOTE — TELEPHONE ENCOUNTER
Caller: Alix Jama    Relationship: Emergency Contact        What is the best time to reach you:ANYTIME         What was the call regarding: MOTHER IS ANXIOUS AND WEIGHT LOSS MEDS AREN'T HELPING WANTING IDEAS ON WHAT ELSE SHE CAN TAKE OR DO FOR HER FACILITY IS CALLING DAUGHTER ABOUT BEING UNCOMPLYING   Do you require a callback: YES

## 2021-12-07 ENCOUNTER — OFFICE VISIT (OUTPATIENT)
Dept: INTERNAL MEDICINE | Facility: CLINIC | Age: 86
End: 2021-12-07

## 2021-12-07 VITALS
TEMPERATURE: 97.1 F | WEIGHT: 97.8 LBS | DIASTOLIC BLOOD PRESSURE: 60 MMHG | HEIGHT: 63 IN | HEART RATE: 109 BPM | OXYGEN SATURATION: 99 % | BODY MASS INDEX: 17.33 KG/M2 | SYSTOLIC BLOOD PRESSURE: 140 MMHG

## 2021-12-07 DIAGNOSIS — F41.9 ANXIETY: ICD-10-CM

## 2021-12-07 DIAGNOSIS — Z23 FLU VACCINE NEED: ICD-10-CM

## 2021-12-07 DIAGNOSIS — F01.50 VASCULAR DEMENTIA WITHOUT BEHAVIORAL DISTURBANCE (HCC): Primary | ICD-10-CM

## 2021-12-07 PROCEDURE — G0008 ADMIN INFLUENZA VIRUS VAC: HCPCS | Performed by: INTERNAL MEDICINE

## 2021-12-07 PROCEDURE — 90662 IIV NO PRSV INCREASED AG IM: CPT | Performed by: INTERNAL MEDICINE

## 2021-12-07 PROCEDURE — 99214 OFFICE O/P EST MOD 30 MIN: CPT | Performed by: INTERNAL MEDICINE

## 2021-12-07 RX ORDER — LORAZEPAM 0.5 MG/1
0.5 TABLET ORAL 2 TIMES DAILY
Qty: 60 TABLET | Refills: 5 | Status: SHIPPED | OUTPATIENT
Start: 2021-12-07

## 2021-12-07 NOTE — PROGRESS NOTES
Subjective   Sabiha Sherwood is a 89 y.o. female.   Chief Complaint   Patient presents with   • Anxiety     over the last 2 weeks pts anxiety has been worsening and she has not been eating well.  pt have even tried to leave [facility] suites on her own   • Anorexia       History of Present Illness patient has been having quite a bit of anxiety to the point she is calling her daughter frequently she is also having difficulty eating.  Apparently she did well on Ativan many years ago has been off of it for some time.    The following portions of the patient's history were reviewed and updated as appropriate: allergies, current medications, past family history, past medical history, past social history, past surgical history and problem list.    Review of Systems   Unable to perform ROS: Dementia       Objective   Past Medical History:   Diagnosis Date   • Acute renal failure syndrome (HCC)    • Arrhythmia    • Diverticulitis    • Hiatal hernia    • Hypertension    • UTI (urinary tract infection)    • Vertigo       Past Surgical History:   Procedure Laterality Date   • CHOLECYSTECTOMY     • COLONOSCOPY N/A 5/10/2019    Procedure: COLONOSCOPY WITH ANESTHESIA;  Surgeon: Steven Bassett MD;  Location: [facility] ENDOSCOPY;  Service: Gastroenterology   • ENDOSCOPY N/A 5/10/2019    Procedure: ESOPHAGOGASTRODUODENOSCOPY WITH ANESTHESIA;  Surgeon: Steven Bassett MD;  Location: [facility] ENDOSCOPY;  Service: Gastroenterology   • HYSTERECTOMY          Current Outpatient Medications:   •  aspirin (aspirin) 81 MG EC tablet, Take 1 tablet by mouth Daily., Disp: 30 tablet, Rfl: 11  •  citalopram (CeleXA) 20 MG tablet, TAKE ONE TABLET BY MOUTH EVERY DAY, Disp: 30 tablet, Rfl: 2  •  cloNIDine (CATAPRES) 0.1 MG tablet, TAKE ONE TABLET BY MOUTH 2 TIMES A DAY, Disp: 60 tablet, Rfl: 11  •  Cyanocobalamin 1000 MCG sublingual tablet, Place 1,000 mcg under the tongue Daily., Disp: 1 each, Rfl: 5  •  dilTIAZem CD (CARDIZEM CD) 240 MG 24 hr  capsule, TAKE ONE CAPSULE BY MOUTH EVERY DAY, Disp: 30 capsule, Rfl: 2  •  estradiol (ESTRACE) 0.5 MG tablet, Take 1 tablet by mouth Daily., Disp: 90 tablet, Rfl: 3  •  lisinopril (PRINIVIL,ZESTRIL) 10 MG tablet, Take 1 tablet by mouth Daily., Disp: 90 tablet, Rfl: 3  •  montelukast (SINGULAIR) 10 MG tablet, Take 1 tablet by mouth Every Night., Disp: 90 tablet, Rfl: 3  •  multivitamin-minerals tablet tablet, TAKE 1 TABLET BY MOUTH EVERY DAY, Disp: 90 tablet, Rfl: 0  •  nitroglycerin (Nitrostat) 0.4 MG SL tablet, Place  under the tongue., Disp: , Rfl:   •  pantoprazole (PROTONIX) 40 MG EC tablet, Take 1 tablet by mouth Daily., Disp: 30 tablet, Rfl: 2  •  potassium chloride 10 MEQ CR tablet, Take 10 mEq by mouth Daily., Disp: , Rfl:   •  risperiDONE (risperDAL) 0.25 MG tablet, TAKE ONE TABLET BY MOUTH 3 TIMES A DAY, Disp: 90 tablet, Rfl: 2  •  traZODone (DESYREL) 50 MG tablet, Take 1.5 tablets by mouth every night at bedtime., Disp: 45 tablet, Rfl: 5  •  vitamin D (ERGOCALCIFEROL) 1.25 MG (43764 UT) capsule capsule, TAKE ONE CAPSULE BY MOUTH ONCE WEEKLY ON THURSDAY, Disp: 4 capsule, Rfl: 11  •  LORazepam (Ativan) 0.5 MG tablet, Take 1 tablet by mouth 2 (Two) Times a Day. Hold if sedate, Disp: 60 tablet, Rfl: 5    Current Facility-Administered Medications:   •  cyanocobalamin injection 1,000 mcg, 1,000 mcg, Intramuscular, Q28 Days, Loc Sinclair MD, 1,000 mcg at 12/02/20 1122      Vitals:    12/07/21 1138   BP: 140/60   Pulse: 109   Temp: 97.1 °F (36.2 °C)   SpO2: 99%         12/07/21  1138   Weight: 44.4 kg (97 lb 12.8 oz)       Body mass index is 17.32 kg/m².    Physical Exam  Constitutional:       Appearance: She is well-developed.   HENT:      Head: Normocephalic and atraumatic.   Eyes:      Pupils: Pupils are equal, round, and reactive to light.   Cardiovascular:      Rate and Rhythm: Normal rate and regular rhythm.   Pulmonary:      Effort: Pulmonary effort is normal.      Breath sounds: Normal breath sounds.    Abdominal:      General: Bowel sounds are normal.      Palpations: Abdomen is soft.   Musculoskeletal:         General: Normal range of motion.      Cervical back: Normal range of motion and neck supple.   Skin:     General: Skin is warm and dry.   Neurological:      Mental Status: She is alert.      Comments: Patient has an obvious memory disorder however she can knows me she knows where she is.   Psychiatric:         Behavior: Behavior normal.               Assessment/Plan   Diagnoses and all orders for this visit:    1. Vascular dementia without behavioral disturbance (HCC) (Primary)    2. Anxiety  -     LORazepam (Ativan) 0.5 MG tablet; Take 1 tablet by mouth 2 (Two) Times a Day. Hold if sedate  Dispense: 60 tablet; Refill: 5    3. Flu vaccine need  -     Fluzone High-Dose 65+yrs      Patient is here with vascular dementia we will try some Ativan to see if that calms her down a bit I am also hoping that will have some benefit with regard to her appetite.  I am not opposed to trying Megace in the future to get her eating better I think that would be my next step.

## 2021-12-10 RX ORDER — PANTOPRAZOLE SODIUM 40 MG/1
40 TABLET, DELAYED RELEASE ORAL DAILY
Qty: 90 TABLET | Refills: 1 | Status: SHIPPED | OUTPATIENT
Start: 2021-12-10

## 2021-12-10 RX ORDER — DILTIAZEM HYDROCHLORIDE 240 MG/1
CAPSULE, COATED, EXTENDED RELEASE ORAL
Qty: 30 CAPSULE | Refills: 2 | Status: SHIPPED | OUTPATIENT
Start: 2021-12-10

## 2021-12-11 ENCOUNTER — HOSPITAL ENCOUNTER (INPATIENT)
Age: 86
LOS: 2 days | Discharge: HOME | DRG: 682 | End: 2021-12-13
Attending: EMERGENCY MEDICINE | Admitting: STUDENT IN AN ORGANIZED HEALTH CARE EDUCATION/TRAINING PROGRAM
Payer: MEDICARE

## 2021-12-11 DIAGNOSIS — F41.9 ANXIETY: ICD-10-CM

## 2021-12-11 DIAGNOSIS — R63.8 DECREASED ORAL INTAKE: ICD-10-CM

## 2021-12-11 DIAGNOSIS — N17.9 AKI (ACUTE KIDNEY INJURY) (HCC): Primary | ICD-10-CM

## 2021-12-11 PROBLEM — N18.31 STAGE 3A CHRONIC KIDNEY DISEASE (HCC): Status: ACTIVE | Noted: 2019-05-31

## 2021-12-11 LAB
ALBUMIN SERPL-MCNC: 3.5 G/DL (ref 3.5–5.2)
ALP BLD-CCNC: 49 U/L (ref 35–104)
ALT SERPL-CCNC: 14 U/L (ref 5–33)
ANION GAP SERPL CALCULATED.3IONS-SCNC: 12 MMOL/L (ref 7–19)
AST SERPL-CCNC: 24 U/L (ref 5–32)
BASOPHILS ABSOLUTE: 0 K/UL (ref 0–0.2)
BASOPHILS RELATIVE PERCENT: 0.3 % (ref 0–1)
BILIRUB SERPL-MCNC: <0.2 MG/DL (ref 0.2–1.2)
BILIRUBIN URINE: NEGATIVE
BLOOD, URINE: NEGATIVE
BUN BLDV-MCNC: 40 MG/DL (ref 8–23)
CALCIUM SERPL-MCNC: 8.9 MG/DL (ref 8.2–9.6)
CHLORIDE BLD-SCNC: 101 MMOL/L (ref 98–111)
CLARITY: ABNORMAL
CO2: 20 MMOL/L (ref 22–29)
COLOR: ABNORMAL
CREAT SERPL-MCNC: 1.9 MG/DL (ref 0.5–0.9)
EOSINOPHILS ABSOLUTE: 0.1 K/UL (ref 0–0.6)
EOSINOPHILS RELATIVE PERCENT: 1.4 % (ref 0–5)
GFR AFRICAN AMERICAN: 30
GFR NON-AFRICAN AMERICAN: 25
GLUCOSE BLD-MCNC: 100 MG/DL (ref 74–109)
GLUCOSE URINE: NEGATIVE MG/DL
HCT VFR BLD CALC: 33.7 % (ref 37–47)
HEMOGLOBIN: 11.1 G/DL (ref 12–16)
IMMATURE GRANULOCYTES #: 0 K/UL
KETONES, URINE: 15 MG/DL
LEUKOCYTE ESTERASE, URINE: NEGATIVE
LYMPHOCYTES ABSOLUTE: 1.3 K/UL (ref 1.1–4.5)
LYMPHOCYTES RELATIVE PERCENT: 20.8 % (ref 20–40)
MCH RBC QN AUTO: 31.1 PG (ref 27–31)
MCHC RBC AUTO-ENTMCNC: 32.9 G/DL (ref 33–37)
MCV RBC AUTO: 94.4 FL (ref 81–99)
MONOCYTES ABSOLUTE: 0.3 K/UL (ref 0–0.9)
MONOCYTES RELATIVE PERCENT: 5.1 % (ref 0–10)
NEUTROPHILS ABSOLUTE: 4.5 K/UL (ref 1.5–7.5)
NEUTROPHILS RELATIVE PERCENT: 71.9 % (ref 50–65)
NITRITE, URINE: NEGATIVE
PDW BLD-RTO: 13 % (ref 11.5–14.5)
PH UA: 5 (ref 5–8)
PLATELET # BLD: 182 K/UL (ref 130–400)
PMV BLD AUTO: 10.8 FL (ref 9.4–12.3)
POTASSIUM SERPL-SCNC: 4.6 MMOL/L (ref 3.5–5)
PROTEIN UA: NEGATIVE MG/DL
RBC # BLD: 3.57 M/UL (ref 4.2–5.4)
SARS-COV-2, NAAT: NOT DETECTED
SODIUM BLD-SCNC: 133 MMOL/L (ref 136–145)
SPECIFIC GRAVITY UA: 1.02 (ref 1–1.03)
TOTAL PROTEIN: 6.4 G/DL (ref 6.6–8.7)
UROBILINOGEN, URINE: 0.2 E.U./DL
WBC # BLD: 6.3 K/UL (ref 4.8–10.8)

## 2021-12-11 PROCEDURE — 6370000000 HC RX 637 (ALT 250 FOR IP): Performed by: STUDENT IN AN ORGANIZED HEALTH CARE EDUCATION/TRAINING PROGRAM

## 2021-12-11 PROCEDURE — 81003 URINALYSIS AUTO W/O SCOPE: CPT

## 2021-12-11 PROCEDURE — 6370000000 HC RX 637 (ALT 250 FOR IP): Performed by: NURSE PRACTITIONER

## 2021-12-11 PROCEDURE — 6360000002 HC RX W HCPCS: Performed by: NURSE PRACTITIONER

## 2021-12-11 PROCEDURE — P9612 CATHETERIZE FOR URINE SPEC: HCPCS

## 2021-12-11 PROCEDURE — 85025 COMPLETE CBC W/AUTO DIFF WBC: CPT

## 2021-12-11 PROCEDURE — 87635 SARS-COV-2 COVID-19 AMP PRB: CPT

## 2021-12-11 PROCEDURE — 2580000003 HC RX 258: Performed by: EMERGENCY MEDICINE

## 2021-12-11 PROCEDURE — 36415 COLL VENOUS BLD VENIPUNCTURE: CPT

## 2021-12-11 PROCEDURE — 80053 COMPREHEN METABOLIC PANEL: CPT

## 2021-12-11 PROCEDURE — 2580000003 HC RX 258: Performed by: NURSE PRACTITIONER

## 2021-12-11 PROCEDURE — 99284 EMERGENCY DEPT VISIT MOD MDM: CPT

## 2021-12-11 PROCEDURE — 1210000000 HC MED SURG R&B

## 2021-12-11 RX ORDER — ONDANSETRON 2 MG/ML
4 INJECTION INTRAMUSCULAR; INTRAVENOUS EVERY 6 HOURS PRN
Status: DISCONTINUED | OUTPATIENT
Start: 2021-12-11 | End: 2021-12-13 | Stop reason: HOSPADM

## 2021-12-11 RX ORDER — MECOBALAMIN 5000 MCG
5 TABLET,DISINTEGRATING ORAL NIGHTLY
Status: DISCONTINUED | OUTPATIENT
Start: 2021-12-11 | End: 2021-12-12

## 2021-12-11 RX ORDER — ONDANSETRON 4 MG/1
4 TABLET, ORALLY DISINTEGRATING ORAL EVERY 8 HOURS PRN
Status: DISCONTINUED | OUTPATIENT
Start: 2021-12-11 | End: 2021-12-13 | Stop reason: HOSPADM

## 2021-12-11 RX ORDER — SODIUM CHLORIDE 0.9 % (FLUSH) 0.9 %
5-40 SYRINGE (ML) INJECTION EVERY 12 HOURS SCHEDULED
Status: DISCONTINUED | OUTPATIENT
Start: 2021-12-11 | End: 2021-12-13 | Stop reason: HOSPADM

## 2021-12-11 RX ORDER — SODIUM CHLORIDE 0.9 % (FLUSH) 0.9 %
5-40 SYRINGE (ML) INJECTION PRN
Status: DISCONTINUED | OUTPATIENT
Start: 2021-12-11 | End: 2021-12-13 | Stop reason: HOSPADM

## 2021-12-11 RX ORDER — OLANZAPINE 10 MG/1
5 INJECTION, POWDER, LYOPHILIZED, FOR SOLUTION INTRAMUSCULAR
Status: ACTIVE | OUTPATIENT
Start: 2021-12-11 | End: 2021-12-11

## 2021-12-11 RX ORDER — ERGOCALCIFEROL 1.25 MG/1
50000 CAPSULE ORAL WEEKLY
Status: DISCONTINUED | OUTPATIENT
Start: 2021-12-11 | End: 2021-12-13 | Stop reason: HOSPADM

## 2021-12-11 RX ORDER — PANTOPRAZOLE SODIUM 40 MG/1
40 TABLET, DELAYED RELEASE ORAL
Status: DISCONTINUED | OUTPATIENT
Start: 2021-12-12 | End: 2021-12-13 | Stop reason: HOSPADM

## 2021-12-11 RX ORDER — SODIUM CHLORIDE 9 MG/ML
25 INJECTION, SOLUTION INTRAVENOUS PRN
Status: DISCONTINUED | OUTPATIENT
Start: 2021-12-11 | End: 2021-12-13 | Stop reason: HOSPADM

## 2021-12-11 RX ORDER — M-VIT,TX,IRON,MINS/CALC/FOLIC 27MG-0.4MG
1 TABLET ORAL DAILY
Status: DISCONTINUED | OUTPATIENT
Start: 2021-12-11 | End: 2021-12-13 | Stop reason: HOSPADM

## 2021-12-11 RX ORDER — CHOLECALCIFEROL (VITAMIN D3) 125 MCG
500 CAPSULE ORAL DAILY
Status: DISCONTINUED | OUTPATIENT
Start: 2021-12-11 | End: 2021-12-13 | Stop reason: HOSPADM

## 2021-12-11 RX ORDER — ESTRADIOL 1 MG/1
1 TABLET ORAL DAILY
Status: DISCONTINUED | OUTPATIENT
Start: 2021-12-11 | End: 2021-12-13 | Stop reason: HOSPADM

## 2021-12-11 RX ORDER — CLONIDINE HYDROCHLORIDE 0.1 MG/1
0.1 TABLET ORAL 2 TIMES DAILY
Status: DISCONTINUED | OUTPATIENT
Start: 2021-12-11 | End: 2021-12-13 | Stop reason: HOSPADM

## 2021-12-11 RX ORDER — SODIUM CHLORIDE 9 MG/ML
INJECTION, SOLUTION INTRAVENOUS CONTINUOUS
Status: ACTIVE | OUTPATIENT
Start: 2021-12-11 | End: 2021-12-12

## 2021-12-11 RX ORDER — HEPARIN SODIUM 5000 [USP'U]/ML
5000 INJECTION, SOLUTION INTRAVENOUS; SUBCUTANEOUS EVERY 8 HOURS SCHEDULED
Status: DISCONTINUED | OUTPATIENT
Start: 2021-12-11 | End: 2021-12-12

## 2021-12-11 RX ORDER — POTASSIUM CHLORIDE 750 MG/1
10 TABLET, EXTENDED RELEASE ORAL DAILY
Status: DISCONTINUED | OUTPATIENT
Start: 2021-12-11 | End: 2021-12-13 | Stop reason: HOSPADM

## 2021-12-11 RX ORDER — ACETAMINOPHEN 650 MG/1
650 SUPPOSITORY RECTAL EVERY 6 HOURS PRN
Status: DISCONTINUED | OUTPATIENT
Start: 2021-12-11 | End: 2021-12-13 | Stop reason: HOSPADM

## 2021-12-11 RX ORDER — DILTIAZEM HYDROCHLORIDE 240 MG/1
240 CAPSULE, COATED, EXTENDED RELEASE ORAL DAILY
Status: DISCONTINUED | OUTPATIENT
Start: 2021-12-11 | End: 2021-12-13 | Stop reason: HOSPADM

## 2021-12-11 RX ORDER — CITALOPRAM 10 MG/1
10 TABLET ORAL DAILY
Status: DISCONTINUED | OUTPATIENT
Start: 2021-12-11 | End: 2021-12-13 | Stop reason: HOSPADM

## 2021-12-11 RX ORDER — ACETAMINOPHEN 325 MG/1
650 TABLET ORAL EVERY 6 HOURS PRN
Status: DISCONTINUED | OUTPATIENT
Start: 2021-12-11 | End: 2021-12-13 | Stop reason: HOSPADM

## 2021-12-11 RX ORDER — SODIUM CHLORIDE, SODIUM LACTATE, POTASSIUM CHLORIDE, AND CALCIUM CHLORIDE .6; .31; .03; .02 G/100ML; G/100ML; G/100ML; G/100ML
500 INJECTION, SOLUTION INTRAVENOUS ONCE
Status: COMPLETED | OUTPATIENT
Start: 2021-12-11 | End: 2021-12-11

## 2021-12-11 RX ORDER — MIRTAZAPINE 15 MG/1
15 TABLET, FILM COATED ORAL NIGHTLY
Status: DISCONTINUED | OUTPATIENT
Start: 2021-12-11 | End: 2021-12-13 | Stop reason: HOSPADM

## 2021-12-11 RX ADMIN — CYANOCOBALAMIN TAB 500 MCG 500 MCG: 500 TAB at 17:43

## 2021-12-11 RX ADMIN — MIRTAZAPINE 15 MG: 15 TABLET, FILM COATED ORAL at 20:20

## 2021-12-11 RX ADMIN — ERGOCALCIFEROL 50000 UNITS: 1.25 CAPSULE ORAL at 17:52

## 2021-12-11 RX ADMIN — DILTIAZEM HYDROCHLORIDE 240 MG: 240 CAPSULE, COATED, EXTENDED RELEASE ORAL at 17:43

## 2021-12-11 RX ADMIN — HEPARIN SODIUM 5000 UNITS: 5000 INJECTION INTRAVENOUS; SUBCUTANEOUS at 17:44

## 2021-12-11 RX ADMIN — Medication 10 ML: at 20:20

## 2021-12-11 RX ADMIN — ESTRADIOL 1 MG: 1 TABLET ORAL at 17:43

## 2021-12-11 RX ADMIN — POTASSIUM CHLORIDE 10 MEQ: 750 TABLET, EXTENDED RELEASE ORAL at 17:43

## 2021-12-11 RX ADMIN — Medication 5 MG: at 20:20

## 2021-12-11 RX ADMIN — SODIUM CHLORIDE 75 ML/HR: 9 INJECTION, SOLUTION INTRAVENOUS at 17:35

## 2021-12-11 RX ADMIN — HEPARIN SODIUM 5000 UNITS: 5000 INJECTION INTRAVENOUS; SUBCUTANEOUS at 20:20

## 2021-12-11 RX ADMIN — SODIUM CHLORIDE, POTASSIUM CHLORIDE, SODIUM LACTATE AND CALCIUM CHLORIDE 500 ML: 600; 310; 30; 20 INJECTION, SOLUTION INTRAVENOUS at 14:04

## 2021-12-11 RX ADMIN — CLONIDINE HYDROCHLORIDE 0.1 MG: 0.1 TABLET ORAL at 20:20

## 2021-12-11 RX ADMIN — CITALOPRAM HYDROBROMIDE 10 MG: 10 TABLET ORAL at 17:43

## 2021-12-11 RX ADMIN — MULTIPLE VITAMINS W/ MINERALS TAB 1 TABLET: TAB at 17:43

## 2021-12-11 ASSESSMENT — ENCOUNTER SYMPTOMS
SHORTNESS OF BREATH: 0
SORE THROAT: 0
TROUBLE SWALLOWING: 0
WHEEZING: 0
CONSTIPATION: 0
ABDOMINAL PAIN: 0
DIARRHEA: 0
ABDOMINAL DISTENTION: 0
NAUSEA: 0
COUGH: 0
BLOOD IN STOOL: 0
VOMITING: 0
SINUS PAIN: 0

## 2021-12-11 ASSESSMENT — PAIN SCALES - GENERAL
PAINLEVEL_OUTOF10: 0
PAINLEVEL_OUTOF10: 0

## 2021-12-11 NOTE — H&P
[facility] Hospitalists      Hospitalist - History & Physical      PCP: Johana Strong MD    Date of Admission: 12/11/2021    Date of Service: 12/11/2021    Chief Complaint:  Failure to Thrive    History Of Present Illness: The patient is a 80 y.o. female with a hx of dementia, CKD, GERD, and HTN  who presented to [address] [place] [address] ED complaining of poor po intake for ~5 days. Her daughter is at bedside, who states that she was able to walk to a Dr's appointment on Wednesday. She has become increasingly weak, and has had difficulty walking over the last 2 days. The patient states that she denies N/V/D or abdominal pain. She denies fever. She states that she just doesn't feel like eating and feels tired. Her daughter states that she was recently started on Lorazapam 0.5mg PO BID for anxiousness. This is not currently on her list.  This is markedly different from her baseline. Further ED work-up revealed revealed Na-133, K-4.6, CO2-20, BUN-40 and creatinine-1.9. Total protein 6.4. WBC-6.3, H/H-11.1-33.7, plt-182. UA was negative for infection. Covid swab was negative. Hospitalists are being requested for admission for BRO. Past Medical History:        Diagnosis Date    Heart palpitations     Hypertension        Past Surgical History:        Procedure Laterality Date    CAROTID ENDARTERECTOMY      CHOLECYSTECTOMY      HYSTERECTOMY      KIDNEY STONE SURGERY         Home Medications:  Prior to Admission medications    Medication Sig Start Date End Date Taking?  Authorizing Provider   melatonin 5 MG TBDP disintegrating tablet Take 1 tablet by mouth nightly 1/16/21   Cristiano Plummer MD   vitamin B-12 500 MCG tablet Take 1 tablet by mouth daily 1/17/21   Cristiano Plummer MD   hydrOXYzine (ATARAX) 10 MG tablet Take 1 tablet by mouth 3 times daily as needed for Anxiety 1/16/21   Cristiano Plummer MD   vitamin D (ERGOCALCIFEROL) 1.25 MG (55766 UT) CAPS capsule Take 50,000 Units by mouth once a week    Historical Provider, MD estradiol (ESTRACE) 1 MG tablet Take 1 mg by mouth daily    Historical Provider, MD   potassium chloride (MICRO-K) 10 MEQ extended release capsule Take 10 mEq by mouth daily     Historical Provider, MD   Multiple Vitamins-Minerals (THERAPEUTIC MULTIVITAMIN-MINERALS) tablet Take 1 tablet by mouth daily    Historical Provider, MD   citalopram (CELEXA) 10 MG tablet Take 10 mg by mouth daily    Historical Provider, MD   pantoprazole sodium (PROTONIX) 40 MG PACK packet Take 40 mg by mouth every morning (before breakfast)    Historical Provider, MD   cloNIDine (CATAPRES) 0.1 MG tablet Take 0.1 mg by mouth 2 times daily    Historical Provider, MD   diltiazem (DILACOR XR) 240 MG extended release capsule Take 240 mg by mouth daily    Historical Provider, MD       Allergies:    Codeine    Social History:    The patient currently lives at an assisted living facility  Tobacco:   reports that she has never smoked. She has never used smokeless tobacco.  Alcohol:   reports no history of alcohol use. Illicit Drugs: Denies     Family History:      Problem Relation Age of Onset    Stroke Mother         CAD, palpitations,  12       Review of Systems:   Review of Systems   Constitutional: Positive for activity change, appetite change and fatigue. Negative for chills, diaphoresis and fever. HENT: Negative for congestion, ear pain, sinus pain, sore throat and trouble swallowing. Eyes: Negative for visual disturbance. Respiratory: Negative for cough, shortness of breath and wheezing. Cardiovascular: Negative for chest pain, palpitations and leg swelling. Gastrointestinal: Negative for abdominal distention, abdominal pain, blood in stool, constipation, diarrhea, nausea and vomiting. Endocrine: Negative for cold intolerance and heat intolerance. Genitourinary: Negative for difficulty urinating, flank pain, frequency and urgency. Musculoskeletal: Negative for arthralgias and myalgias.    Neurological: Negative status is at baseline. She is confused. Cranial Nerves: No cranial nerve deficit. Sensory: No sensory deficit. Motor: No weakness. Psychiatric:         Mood and Affect: Mood normal.         Behavior: Behavior normal.         Thought Content:  Thought content normal.         Judgment: Judgment normal.          Diagnostic Data:  CBC:  Recent Labs     12/11/21  1207   WBC 6.3   HGB 11.1*   HCT 33.7*        BMP:  Recent Labs     12/11/21  1207   *   K 4.6      CO2 20*   BUN 40*   CREATININE 1.9*   CALCIUM 8.9     Recent Labs     12/11/21  1207   AST 24   ALT 14   BILITOT <0.2   ALKPHOS 49   Urinalysis:  Lab Results   Component Value Date    NITRU Negative 12/11/2021    WBCUA 9 01/13/2021    BACTERIA NEGATIVE 01/13/2021    RBCUA 4 01/13/2021    BLOODU Negative 12/11/2021    SPECGRAV 1.018 12/11/2021    GLUCOSEU Negative 12/11/2021       Assessment/Plan:  Principal Problem:    Acute kidney injury ([facility]    Admit to med surg with tele-Full code              - Creatinine 1.9 today               - IVFs NS@ 75 ml/hr              - Monitor I's and O's closely              - Monitor labs closely              - Avoid hypotension              - Avoid nephrotoxic agents    -Soft diet   -Avoid excessive sedation   -VTE prophylaxis with heparin   -Up with assist    Active Problems:    Mitral valve insufficiency   -Noted   -Strict I&O   -Judicious fluid resuscitation      Aortic valve insufficiency   -Noted   -Daily weights   -Strict I&O   -Continue home meds      Essential hypertension   -Noted   -Continue home meds      Gastroesophageal reflux disease without esophagitis    -Noted   -Continue PPI      Severe malnutrition ([facility]   -Consult Dietician    -Supplemental nutrition to be offered with meals      Stage 3a chronic kidney disease ([facility]    -Noted   -Monitor with daily labs      Dementia without behavioral disturbance ([facility]   -SLP consult   -PT/OT consult    VTE prophylaxis-Heparin  GI prophylaxis-Pantoprazole     Signed:  BRIGETTE Frost, 12/11/2021 3:58 PM

## 2021-12-11 NOTE — ED PROVIDER NOTES
HYSTERECTOMY      KIDNEY STONE SURGERY           CURRENT MEDICATIONS     Previous Medications    CITALOPRAM (CELEXA) 10 MG TABLET    Take 10 mg by mouth daily    CLONIDINE (CATAPRES) 0.1 MG TABLET    Take 0.1 mg by mouth 2 times daily    DILTIAZEM (DILACOR XR) 240 MG EXTENDED RELEASE CAPSULE    Take 240 mg by mouth daily    ESTRADIOL (ESTRACE) 1 MG TABLET    Take 1 mg by mouth daily    HYDROXYZINE (ATARAX) 10 MG TABLET    Take 1 tablet by mouth 3 times daily as needed for Anxiety    MELATONIN 5 MG TBDP DISINTEGRATING TABLET    Take 1 tablet by mouth nightly    MULTIPLE VITAMINS-MINERALS (THERAPEUTIC MULTIVITAMIN-MINERALS) TABLET    Take 1 tablet by mouth daily    PANTOPRAZOLE SODIUM (PROTONIX) 40 MG PACK PACKET    Take 40 mg by mouth every morning (before breakfast)    POTASSIUM CHLORIDE (MICRO-K) 10 MEQ EXTENDED RELEASE CAPSULE    Take 10 mEq by mouth daily     VITAMIN B-12 500 MCG TABLET    Take 1 tablet by mouth daily    VITAMIN D (ERGOCALCIFEROL) 1.25 MG (49962 UT) CAPS CAPSULE    Take 50,000 Units by mouth once a week       ALLERGIES     Codeine    FAMILY HISTORY       Family History   Problem Relation Age of Onset    Stroke Mother         CAD, palpitations,  12          SOCIAL HISTORY       Social History     Socioeconomic History    Marital status:       Spouse name: None    Number of children: None    Years of education: None    Highest education level: None   Occupational History    None   Tobacco Use    Smoking status: Never Smoker    Smokeless tobacco: Never Used   Vaping Use    Vaping Use: Never used   Substance and Sexual Activity    Alcohol use: No     Alcohol/week: 0.0 standard drinks    Drug use: No    Sexual activity: None   Other Topics Concern    None   Social History Narrative    None     Social Determinants of Health     Financial Resource Strain:     Difficulty of Paying Living Expenses: Not on file   Food Insecurity:     Worried About Running Out of Food in the effort is normal. No respiratory distress. Breath sounds: Normal breath sounds. No stridor. Abdominal:      General: There is no distension. Palpations: Abdomen is soft. Tenderness: There is no abdominal tenderness. There is no guarding. Musculoskeletal:      Right lower leg: No edema. Left lower leg: No edema. Skin:     Capillary Refill: Capillary refill takes less than 2 seconds. Coloration: Skin is not pale. Findings: No rash. Neurological:      Mental Status: She is alert and oriented to person, place, and time. Mental status is at baseline. Psychiatric:         Behavior: Behavior is cooperative. DIAGNOSTIC RESULTS       LABS:  Labs Reviewed   COMPREHENSIVE METABOLIC PANEL - Abnormal; Notable for the following components:       Result Value    Sodium 133 (*)     CO2 20 (*)     BUN 40 (*)     CREATININE 1.9 (*)     GFR Non- 25 (*)     GFR  30 (*)     Total Protein 6.4 (*)     All other components within normal limits   CBC WITH AUTO DIFFERENTIAL - Abnormal; Notable for the following components:    RBC 3.57 (*)     Hemoglobin 11.1 (*)     Hematocrit 33.7 (*)     MCH 31.1 (*)     MCHC 32.9 (*)     Neutrophils % 71.9 (*)     All other components within normal limits   URINALYSIS - Abnormal; Notable for the following components:    Color, UA DARK YELLOW (*)     Clarity, UA CLOUDY (*)     Ketones, Urine 15 (*)     All other components within normal limits   COVID-19, RAPID       All other labs were within normal range or not returned as of this dictation. EMERGENCY DEPARTMENT COURSE and DIFFERENTIAL DIAGNOSIS/MDM:   Vitals:    Vitals:    12/11/21 1145   BP: (!) 116/43   Pulse: 70   Resp: 18   Temp: 98.8 °F (37.1 °C)   TempSrc: Oral   SpO2: 98%       MDM    Reassessment    Patient's laboratory studies reveal evidence of acute kidney injury with serum creatinine 1.9/BUN 40. Serum potassium looks okay at 4.6.   Urinalysis is unremarkable for evidence of urinary tract infection. She has received some IV fluids here in the ED and will require admission for further evaluation and treatment. CONSULTS:    Case was discussed with Dr. Fozia Kearney regarding admission to the hospitalist service. PROCEDURES:  Unless otherwise noted below, none     Procedures    FINAL IMPRESSION      1. BRO (acute kidney injury) ([facility]    2.  Decreased oral intake          DISPOSITION/PLAN   DISPOSITION Admitted 12/11/2021 02:25:57 PM    (Please note that portions of this note were completed with a voice recognition program.  Efforts were made to edit thedictations but occasionally words are mis-transcribed.)    Tracy Ahumada MD (electronically signed)  Attending Emergency Physician          Tracy Ahumada MD  12/11/21 0077

## 2021-12-12 ENCOUNTER — APPOINTMENT (OUTPATIENT)
Dept: ULTRASOUND IMAGING | Age: 86
DRG: 682 | End: 2021-12-12
Payer: MEDICARE

## 2021-12-12 VITALS
SYSTOLIC BLOOD PRESSURE: 140 MMHG | DIASTOLIC BLOOD PRESSURE: 52 MMHG | HEART RATE: 65 BPM | OXYGEN SATURATION: 98 % | TEMPERATURE: 96.8 F | RESPIRATION RATE: 14 BRPM

## 2021-12-12 LAB
ALBUMIN SERPL-MCNC: 3.5 G/DL (ref 3.5–5.2)
ANION GAP SERPL CALCULATED.3IONS-SCNC: 11 MMOL/L (ref 7–19)
BASOPHILS ABSOLUTE: 0 K/UL (ref 0–0.2)
BASOPHILS RELATIVE PERCENT: 0.2 % (ref 0–1)
BUN BLDV-MCNC: 29 MG/DL (ref 8–23)
CALCIUM SERPL-MCNC: 8.7 MG/DL (ref 8.2–9.6)
CHLORIDE BLD-SCNC: 106 MMOL/L (ref 98–111)
CO2: 23 MMOL/L (ref 22–29)
CREAT SERPL-MCNC: 1.3 MG/DL (ref 0.5–0.9)
EOSINOPHILS ABSOLUTE: 0.2 K/UL (ref 0–0.6)
EOSINOPHILS RELATIVE PERCENT: 2.7 % (ref 0–5)
GFR AFRICAN AMERICAN: 46
GFR NON-AFRICAN AMERICAN: 38
GLUCOSE BLD-MCNC: 85 MG/DL (ref 74–109)
HCT VFR BLD CALC: 32.8 % (ref 37–47)
HEMOGLOBIN: 10.4 G/DL (ref 12–16)
IMMATURE GRANULOCYTES #: 0 K/UL
LYMPHOCYTES ABSOLUTE: 1.4 K/UL (ref 1.1–4.5)
LYMPHOCYTES RELATIVE PERCENT: 23.3 % (ref 20–40)
MCH RBC QN AUTO: 30.6 PG (ref 27–31)
MCHC RBC AUTO-ENTMCNC: 31.7 G/DL (ref 33–37)
MCV RBC AUTO: 96.5 FL (ref 81–99)
MONOCYTES ABSOLUTE: 0.4 K/UL (ref 0–0.9)
MONOCYTES RELATIVE PERCENT: 7.2 % (ref 0–10)
NEUTROPHILS ABSOLUTE: 4 K/UL (ref 1.5–7.5)
NEUTROPHILS RELATIVE PERCENT: 66.4 % (ref 50–65)
PDW BLD-RTO: 13 % (ref 11.5–14.5)
PLATELET # BLD: 178 K/UL (ref 130–400)
PMV BLD AUTO: 10.4 FL (ref 9.4–12.3)
POTASSIUM REFLEX MAGNESIUM: 4.3 MMOL/L (ref 3.5–5)
RBC # BLD: 3.4 M/UL (ref 4.2–5.4)
SODIUM BLD-SCNC: 140 MMOL/L (ref 136–145)
WBC # BLD: 6 K/UL (ref 4.8–10.8)

## 2021-12-12 PROCEDURE — 76770 US EXAM ABDO BACK WALL COMP: CPT

## 2021-12-12 PROCEDURE — 85025 COMPLETE CBC W/AUTO DIFF WBC: CPT

## 2021-12-12 PROCEDURE — 2580000003 HC RX 258: Performed by: STUDENT IN AN ORGANIZED HEALTH CARE EDUCATION/TRAINING PROGRAM

## 2021-12-12 PROCEDURE — 36415 COLL VENOUS BLD VENIPUNCTURE: CPT

## 2021-12-12 PROCEDURE — 80048 BASIC METABOLIC PNL TOTAL CA: CPT

## 2021-12-12 PROCEDURE — 6370000000 HC RX 637 (ALT 250 FOR IP): Performed by: STUDENT IN AN ORGANIZED HEALTH CARE EDUCATION/TRAINING PROGRAM

## 2021-12-12 PROCEDURE — 6360000002 HC RX W HCPCS: Performed by: NURSE PRACTITIONER

## 2021-12-12 PROCEDURE — 6370000000 HC RX 637 (ALT 250 FOR IP): Performed by: NURSE PRACTITIONER

## 2021-12-12 PROCEDURE — 1210000000 HC MED SURG R&B

## 2021-12-12 PROCEDURE — 2580000003 HC RX 258: Performed by: NURSE PRACTITIONER

## 2021-12-12 PROCEDURE — 82040 ASSAY OF SERUM ALBUMIN: CPT

## 2021-12-12 RX ORDER — SODIUM CHLORIDE 9 MG/ML
INJECTION, SOLUTION INTRAVENOUS CONTINUOUS
Status: ACTIVE | OUTPATIENT
Start: 2021-12-12 | End: 2021-12-12

## 2021-12-12 RX ORDER — MECOBALAMIN 5000 MCG
10 TABLET,DISINTEGRATING ORAL NIGHTLY
Status: DISCONTINUED | OUTPATIENT
Start: 2021-12-12 | End: 2021-12-13 | Stop reason: HOSPADM

## 2021-12-12 RX ADMIN — HEPARIN SODIUM 5000 UNITS: 5000 INJECTION INTRAVENOUS; SUBCUTANEOUS at 07:23

## 2021-12-12 RX ADMIN — Medication 10 MG: at 21:04

## 2021-12-12 RX ADMIN — CLONIDINE HYDROCHLORIDE 0.1 MG: 0.1 TABLET ORAL at 21:05

## 2021-12-12 RX ADMIN — PANTOPRAZOLE SODIUM 40 MG: 40 TABLET, DELAYED RELEASE ORAL at 07:23

## 2021-12-12 RX ADMIN — ESTRADIOL 1 MG: 1 TABLET ORAL at 08:17

## 2021-12-12 RX ADMIN — CITALOPRAM HYDROBROMIDE 10 MG: 10 TABLET ORAL at 08:17

## 2021-12-12 RX ADMIN — CLONIDINE HYDROCHLORIDE 0.1 MG: 0.1 TABLET ORAL at 08:17

## 2021-12-12 RX ADMIN — Medication 10 ML: at 08:17

## 2021-12-12 RX ADMIN — SODIUM CHLORIDE: 9 INJECTION, SOLUTION INTRAVENOUS at 12:24

## 2021-12-12 RX ADMIN — MIRTAZAPINE 15 MG: 15 TABLET, FILM COATED ORAL at 21:04

## 2021-12-12 RX ADMIN — MULTIPLE VITAMINS W/ MINERALS TAB 1 TABLET: TAB at 08:17

## 2021-12-12 RX ADMIN — POTASSIUM CHLORIDE 10 MEQ: 750 TABLET, EXTENDED RELEASE ORAL at 08:17

## 2021-12-12 RX ADMIN — CYANOCOBALAMIN TAB 500 MCG 500 MCG: 500 TAB at 08:17

## 2021-12-12 RX ADMIN — DILTIAZEM HYDROCHLORIDE 240 MG: 240 CAPSULE, COATED, EXTENDED RELEASE ORAL at 08:17

## 2021-12-12 RX ADMIN — Medication 10 ML: at 21:05

## 2021-12-12 ASSESSMENT — PAIN SCALES - GENERAL
PAINLEVEL_OUTOF10: 0
PAINLEVEL_OUTOF10: 0

## 2021-12-12 ASSESSMENT — PAIN SCALES - WONG BAKER: WONGBAKER_NUMERICALRESPONSE: 0

## 2021-12-12 NOTE — PLAN OF CARE
Problem: Skin Integrity:  Goal: Will show no infection signs and symptoms  Description: Will show no infection signs and symptoms  12/12/2021 0245 by Kristin House RN  Outcome: Ongoing  12/12/2021 0238 by Kristin House RN  Outcome: Ongoing  Goal: Absence of new skin breakdown  Description: Absence of new skin breakdown  12/12/2021 0245 by Kristin House RN  Outcome: Ongoing  12/12/2021 0238 by Kristin House RN  Outcome: Ongoing     Problem: Falls - Risk of:  Goal: Will remain free from falls  Description: Will remain free from falls  12/12/2021 0245 by Kristin House RN  Outcome: Ongoing  12/12/2021 0238 by Kristin House RN  Outcome: Ongoing  Goal: Absence of physical injury  Description: Absence of physical injury  12/12/2021 0245 by Kristin House RN  Outcome: Ongoing  12/12/2021 0238 by Kristin House RN  Outcome: Ongoing

## 2021-12-12 NOTE — PROGRESS NOTES
aphasia  Musculoskeletal: Loss of subcutaneous fat noted, no deformities otherwise  Skin: Warm and dry      Lab Review   Recent Results (from the past 24 hour(s))   CBC auto differential    Collection Time: 12/12/21  2:05 AM   Result Value Ref Range    WBC 6.0 4.8 - 10.8 K/uL    RBC 3.40 (L) 4.20 - 5.40 M/uL    Hemoglobin 10.4 (L) 12.0 - 16.0 g/dL    Hematocrit 32.8 (L) 37.0 - 47.0 %    MCV 96.5 81.0 - 99.0 fL    MCH 30.6 27.0 - 31.0 pg    MCHC 31.7 (L) 33.0 - 37.0 g/dL    RDW 13.0 11.5 - 14.5 %    Platelets 647 333 - 461 K/uL    MPV 10.4 9.4 - 12.3 fL    Neutrophils % 66.4 (H) 50.0 - 65.0 %    Lymphocytes % 23.3 20.0 - 40.0 %    Monocytes % 7.2 0.0 - 10.0 %    Eosinophils % 2.7 0.0 - 5.0 %    Basophils % 0.2 0.0 - 1.0 %    Neutrophils Absolute 4.0 1.5 - 7.5 K/uL    Immature Granulocytes # 0.0 K/uL    Lymphocytes Absolute 1.4 1.1 - 4.5 K/uL    Monocytes Absolute 0.40 0.00 - 0.90 K/uL    Eosinophils Absolute 0.20 0.00 - 0.60 K/uL    Basophils Absolute 0.00 0.00 - 0.20 K/uL   Basic Metabolic Panel w/ Reflex to MG    Collection Time: 12/12/21  2:05 AM   Result Value Ref Range    Sodium 140 136 - 145 mmol/L    Potassium reflex Magnesium 4.3 3.5 - 5.0 mmol/L    Chloride 106 98 - 111 mmol/L    CO2 23 22 - 29 mmol/L    Anion Gap 11 7 - 19 mmol/L    Glucose 85 74 - 109 mg/dL    BUN 29 (H) 8 - 23 mg/dL    CREATININE 1.3 (H) 0.5 - 0.9 mg/dL    GFR Non- 38 (A) >60    GFR  46 (L) >59    Calcium 8.7 8.2 - 9.6 mg/dL   Albumin    Collection Time: 12/12/21  2:05 AM   Result Value Ref Range    Albumin 3.5 3.5 - 5.2 g/dL       I/O last 3 completed shifts: In: 288 [P.O.:75; I.V.:213]  Out: 425 [Urine:425]  I/O this shift:   In: 772 [I.V.:772]  Out: 200 [Urine:200]      Current Facility-Administered Medications:     [START ON 12/13/2021] enoxaparin (LOVENOX) injection 30 mg, 30 mg, SubCUTAneous, Daily, Sujit Norton MD    melatonin disintegrating tablet 10 mg, 10 mg, Oral, Nightly, Sujit Norton, MD    0.9 % sodium chloride infusion, , IntraVENous, Continuous, Carolyn Alves MD, Last Rate: 75 mL/hr at 12/12/21 1224, New Bag at 12/12/21 1224    citalopram (CELEXA) tablet 10 mg, 10 mg, Oral, Daily, Sundra Donal, APRN, 10 mg at 12/12/21 3959    cloNIDine (CATAPRES) tablet 0.1 mg, 0.1 mg, Oral, BID, Sundra Seidel, APRN, 0.1 mg at 12/12/21 7767    dilTIAZem (CARDIZEM CD) extended release capsule 240 mg, 240 mg, Oral, Daily, Sundra Seidel, APRN, 240 mg at 12/12/21 3846    estradiol (ESTRACE) tablet 1 mg, 1 mg, Oral, Daily, Sundra Seidel, APRN, 1 mg at 12/12/21 1524    vitamin D (ERGOCALCIFEROL) capsule 50,000 Units, 50,000 Units, Oral, Weekly, Sundra Donal, APRN, 50,000 Units at 12/11/21 1752    vitamin B-12 (CYANOCOBALAMIN) tablet 500 mcg, 500 mcg, Oral, Daily, Sundra Seidel, APRN, 500 mcg at 12/12/21 5266    potassium chloride (KLOR-CON M) extended release tablet 10 mEq, 10 mEq, Oral, Daily, Sundra Seidel, APRN, 10 mEq at 12/12/21 0817    pantoprazole (PROTONIX) tablet 40 mg, 40 mg, Oral, QAM AC, Sundra Donal, APRN, 40 mg at 12/12/21 8481    therapeutic multivitamin-minerals 1 tablet, 1 tablet, Oral, Daily, Sundra Donal, APRN, 1 tablet at 12/12/21 0817    sodium chloride flush 0.9 % injection 5-40 mL, 5-40 mL, IntraVENous, 2 times per day, Sundjesús Mansfield, APRN, 10 mL at 12/12/21 0817    sodium chloride flush 0.9 % injection 5-40 mL, 5-40 mL, IntraVENous, PRN, Sundjesús Mansfield, APRN    0.9 % sodium chloride infusion, 25 mL, IntraVENous, PRN, Sundjesús Mansfield, APRN    ondansetron (ZOFRAN-ODT) disintegrating tablet 4 mg, 4 mg, Oral, Q8H PRN **OR** ondansetron (ZOFRAN) injection 4 mg, 4 mg, IntraVENous, Q6H PRN, BRIGETTE Clark    acetaminophen (TYLENOL) tablet 650 mg, 650 mg, Oral, Q6H PRN **OR** acetaminophen (TYLENOL) suppository 650 mg, 650 mg, Rectal, Q6H PRN, BRIGETTE Clark    mirtazapine (REMERON) tablet 15 mg, 15 mg, Oral, Nightly, Burt Castañeda MD, 15 mg at 12/11/21 2020      Assessment/plan  Principal Problem:    Acute kidney injury [facility])  Active Problems:    Mitral valve insufficiency    Aortic valve insufficiency    Essential hypertension    Gastroesophageal reflux disease without esophagitis    Severe malnutrition (HCC)    Stage 3a chronic kidney disease ([facility]    Dementia without behavioral disturbance ([facility]  Resolved Problems:    * No resolved hospital problems.  *      BRO on CKD stage IIIa, prerenal, dehydration, poor p.o. intake  Improving, continue IV fluids    Mitral and aortic insufficiency  Monitor volume status and avoid fluid overload    Hypertension  Monitor BP, home antihypertensive regimen, adjust as needed    Dementia, recent history of behavioral disturbance   will consider discontinuing or dose reduction of recently prescribed Ativan  Remeron added for mood and appetite  Continue home Celexa    Malnutrition  Nutritional supplementation    Generalized weakness, debility  PT/OT    DVT prophylaxis with Lovenox        Burt Castañeda MD 12/12/2021 2:19 PM

## 2021-12-13 ENCOUNTER — TELEPHONE (OUTPATIENT)
Dept: INTERNAL MEDICINE | Facility: CLINIC | Age: 86
End: 2021-12-13

## 2021-12-13 ENCOUNTER — READMISSION MANAGEMENT (OUTPATIENT)
Dept: CALL CENTER | Facility: HOSPITAL | Age: 86
End: 2021-12-13

## 2021-12-13 DIAGNOSIS — R53.81 PHYSICAL DECONDITIONING: Primary | ICD-10-CM

## 2021-12-13 DIAGNOSIS — F01.50 VASCULAR DEMENTIA WITHOUT BEHAVIORAL DISTURBANCE (HCC): ICD-10-CM

## 2021-12-13 PROBLEM — Z51.5 PALLIATIVE CARE PATIENT: Status: ACTIVE | Noted: 2021-12-13

## 2021-12-13 LAB
ALBUMIN SERPL-MCNC: 3.6 G/DL (ref 3.5–5.2)
ANION GAP SERPL CALCULATED.3IONS-SCNC: 13 MMOL/L (ref 7–19)
BASOPHILS ABSOLUTE: 0 K/UL (ref 0–0.2)
BASOPHILS RELATIVE PERCENT: 0.3 % (ref 0–1)
BUN BLDV-MCNC: 17 MG/DL (ref 8–23)
CALCIUM SERPL-MCNC: 9 MG/DL (ref 8.2–9.6)
CHLORIDE BLD-SCNC: 105 MMOL/L (ref 98–111)
CO2: 20 MMOL/L (ref 22–29)
CREAT SERPL-MCNC: 1 MG/DL (ref 0.5–0.9)
EOSINOPHILS ABSOLUTE: 0.2 K/UL (ref 0–0.6)
EOSINOPHILS RELATIVE PERCENT: 2.3 % (ref 0–5)
GFR AFRICAN AMERICAN: >59
GFR NON-AFRICAN AMERICAN: 52
GLUCOSE BLD-MCNC: 94 MG/DL (ref 74–109)
HCT VFR BLD CALC: 33.2 % (ref 37–47)
HEMOGLOBIN: 10.6 G/DL (ref 12–16)
IMMATURE GRANULOCYTES #: 0 K/UL
LYMPHOCYTES ABSOLUTE: 1.5 K/UL (ref 1.1–4.5)
LYMPHOCYTES RELATIVE PERCENT: 23.1 % (ref 20–40)
MCH RBC QN AUTO: 30.5 PG (ref 27–31)
MCHC RBC AUTO-ENTMCNC: 31.9 G/DL (ref 33–37)
MCV RBC AUTO: 95.7 FL (ref 81–99)
MONOCYTES ABSOLUTE: 0.4 K/UL (ref 0–0.9)
MONOCYTES RELATIVE PERCENT: 5.5 % (ref 0–10)
NEUTROPHILS ABSOLUTE: 4.5 K/UL (ref 1.5–7.5)
NEUTROPHILS RELATIVE PERCENT: 68.6 % (ref 50–65)
PDW BLD-RTO: 13.1 % (ref 11.5–14.5)
PLATELET # BLD: 188 K/UL (ref 130–400)
PMV BLD AUTO: 10.8 FL (ref 9.4–12.3)
POTASSIUM REFLEX MAGNESIUM: 4.5 MMOL/L (ref 3.5–5)
RBC # BLD: 3.47 M/UL (ref 4.2–5.4)
SODIUM BLD-SCNC: 138 MMOL/L (ref 136–145)
WBC # BLD: 6.5 K/UL (ref 4.8–10.8)

## 2021-12-13 PROCEDURE — 82040 ASSAY OF SERUM ALBUMIN: CPT

## 2021-12-13 PROCEDURE — 2580000003 HC RX 258: Performed by: NURSE PRACTITIONER

## 2021-12-13 PROCEDURE — 6370000000 HC RX 637 (ALT 250 FOR IP): Performed by: NURSE PRACTITIONER

## 2021-12-13 PROCEDURE — 6360000002 HC RX W HCPCS: Performed by: INTERNAL MEDICINE

## 2021-12-13 PROCEDURE — 85025 COMPLETE CBC W/AUTO DIFF WBC: CPT

## 2021-12-13 PROCEDURE — 80048 BASIC METABOLIC PNL TOTAL CA: CPT

## 2021-12-13 PROCEDURE — 92522 EVALUATE SPEECH PRODUCTION: CPT

## 2021-12-13 PROCEDURE — 92610 EVALUATE SWALLOWING FUNCTION: CPT

## 2021-12-13 PROCEDURE — 36415 COLL VENOUS BLD VENIPUNCTURE: CPT

## 2021-12-13 PROCEDURE — 6360000002 HC RX W HCPCS: Performed by: STUDENT IN AN ORGANIZED HEALTH CARE EDUCATION/TRAINING PROGRAM

## 2021-12-13 RX ORDER — LORAZEPAM 0.5 MG/1
0.5 TABLET ORAL EVERY 6 HOURS PRN
Status: DISCONTINUED | OUTPATIENT
Start: 2021-12-13 | End: 2021-12-13 | Stop reason: HOSPADM

## 2021-12-13 RX ORDER — HALOPERIDOL 5 MG/ML
1 INJECTION INTRAMUSCULAR
Status: DISCONTINUED | OUTPATIENT
Start: 2021-12-13 | End: 2021-12-13 | Stop reason: HOSPADM

## 2021-12-13 RX ORDER — LORAZEPAM 0.5 MG/1
0.5 TABLET ORAL EVERY 4 HOURS PRN
Status: DISCONTINUED | OUTPATIENT
Start: 2021-12-13 | End: 2021-12-13

## 2021-12-13 RX ORDER — LORAZEPAM 0.5 MG/1
0.5 TABLET ORAL 2 TIMES DAILY PRN
Qty: 6 TABLET | Refills: 0 | Status: SHIPPED | OUTPATIENT
Start: 2021-12-13 | End: 2021-12-16

## 2021-12-13 RX ORDER — MIRTAZAPINE 15 MG/1
15 TABLET, FILM COATED ORAL NIGHTLY
Qty: 30 TABLET | Refills: 0 | Status: SHIPPED | OUTPATIENT
Start: 2021-12-13

## 2021-12-13 RX ADMIN — MULTIPLE VITAMINS W/ MINERALS TAB 1 TABLET: TAB at 07:55

## 2021-12-13 RX ADMIN — POTASSIUM CHLORIDE 10 MEQ: 750 TABLET, EXTENDED RELEASE ORAL at 07:55

## 2021-12-13 RX ADMIN — CITALOPRAM HYDROBROMIDE 10 MG: 10 TABLET ORAL at 07:55

## 2021-12-13 RX ADMIN — PANTOPRAZOLE SODIUM 40 MG: 40 TABLET, DELAYED RELEASE ORAL at 07:55

## 2021-12-13 RX ADMIN — CYANOCOBALAMIN TAB 500 MCG 500 MCG: 500 TAB at 07:55

## 2021-12-13 RX ADMIN — DILTIAZEM HYDROCHLORIDE 240 MG: 240 CAPSULE, COATED, EXTENDED RELEASE ORAL at 07:55

## 2021-12-13 RX ADMIN — ESTRADIOL 1 MG: 1 TABLET ORAL at 07:55

## 2021-12-13 RX ADMIN — ENOXAPARIN SODIUM 30 MG: 100 INJECTION SUBCUTANEOUS at 07:55

## 2021-12-13 RX ADMIN — HALOPERIDOL LACTATE 1 MG: 5 INJECTION, SOLUTION INTRAMUSCULAR at 02:16

## 2021-12-13 RX ADMIN — CLONIDINE HYDROCHLORIDE 0.1 MG: 0.1 TABLET ORAL at 07:55

## 2021-12-13 RX ADMIN — Medication 10 ML: at 07:56

## 2021-12-13 NOTE — TELEPHONE ENCOUNTER
[facility] is requesting PT, OT, and Speech Therapy Orders.    Fax number [phone]

## 2021-12-13 NOTE — OUTREACH NOTE
Prep Survey      Responses   [facility] facility patient discharged from? Non-BH   Is LACE score < 7 ? Non-BH Discharge   Emergency Room discharge w/ pulse ox? No   Eligibility [facility]   Date of Discharge 12/13/21   Discharge diagnosis REMI   Does the patient have one of the following disease processes/diagnoses(primary or secondary)? Other   Prep survey completed? Yes          Gretchen Kidd RN

## 2021-12-13 NOTE — TELEPHONE ENCOUNTER
Caller: Nina Shah    Relationship to patient: Provider    New or established patient?  [] New  [x] Established    Date of discharge: 12/13/21  Facility discharged from: [facility]    Diagnosis/Symptoms: REMI    Length of stay (If applicable): 2 days

## 2021-12-13 NOTE — PROGRESS NOTES
Consult noted. Will place palliative care evaluation and team will follow. Thank you for allowing us to participate in the care of this patient.     Watson Montes PA-C

## 2021-12-13 NOTE — PROGRESS NOTES
Speech Language Pathology  Facility/Department: [facility] 3 PERI/VAS/MED   CLINICAL BEDSIDE SWALLOW EVALUATION  SPEECH PRODUCTION EVALUATION   NAME: Sandy Haywood  : 1927  MRN: [id number]    ADMISSION DATE: 2021  ADMITTING DIAGNOSIS: has Heart palpitations; Mitral valve insufficiency; Aortic valve insufficiency; Essential hypertension; Diverticulitis; Gastroesophageal reflux disease without esophagitis; Acute cystitis without hematuria; Acute renal failure ([address] Severe malnutrition ([address] Stage 3a chronic kidney disease ([address] Hallucinations; Psychosis ([facility] Dementia without behavioral disturbance ([address] Acute kidney injury [facility]); and Palliative care patient on their problem list.    Date of Eval: 2021  Evaluating Therapist: RICARDO Engle    Current Diet level:  Easy to chew consistency with thin liquids    Reason for Referral  Sandy Haywood was referred for a bedside swallow evaluation to assess the efficiency of her swallow function, identify signs and symptoms of aspiration and make recommendations regarding safe dietary consistencies, effective compensatory strategies, and safe eating environment. Impression  Assessed patient's swallowing function. Patient exhibited decreased oral prep of more solid consistencies, fast oral transit and suspected swallow delay with thin liquids, and sluggish, mild-moderately decreased laryngeal elevation for swallow airway protection. Even so, no outward S/S penetration/aspiration was noted with any ice chip trial, puree consistency trial, regular solid consistency trial, mildly thick/nectar thick liquid trial, or thin H2O trial presented during evaluation this date. At this time, would recommend continuation less restrictive easy to chew consistency with thin liquids. Recommend meds in pudding/applesauce. Will continue to follow. Thank you for this consult.      Treatment Plan  Requires SLP Intervention: Yes     Recommended Diet and Intervention  Diet Solids Recommendation: Easy to chew   Liquid Consistency Recommendation: Thin  Recommended Form of Meds: Meds in puree as able  Therapeutic Interventions: Patient/Family education;Diet tolerance monitoring; Therapeutic PO trials with SLP     Compensatory Swallowing Strategies  Compensatory Swallowing Strategies: Upright as possible for all oral intake;Small bites/sips;Eat/Feed slowly; Alternate solids and liquids; Remain upright for 30-45 minutes after meals     Treatment/Goals  Timeframe for Short-term Goals: 1x/day for 3 days   Goal 1: Patient will tolerate easy to chew consistency and thin liquids with min S/S penetration/aspiration during PO intake. Goal 2: Patient staff will follow swallow safety recommendations to decrease risk of penetration/aspiration during PO intake. Goal 3: Re-assessment of swallow function for potential diet upgrade. Goal 4: Monitor speech production. General  Chart Reviewed: Yes  Behavior/Cognition: Alert; Cooperative  O2 Device: None (Room air)  Communication Observation: (Assessed patient's speech production. Patient exhibited decreased volume of speech and mildly slowed lingual movements during verbalizations. SLP ranked functional intelligibility of speech for unfamiliar listeners at % in utterances with background noise present.)  Follows Directions: Simple   Dentition: Some missing dentition  Patient Positioning: Upright in bed  Consistencies Administered: Ice chips;Dysphagia Pureed (Dysphagia I); Regular solid; Nectar - cup; Thin - cup      Oral Motor Examination   Labial ROM: (Decreased, bilaterally, during labial retraction trials and labial protrusion trials.)  Labial Strength: (Adequate during labial compression trials.)  Labial Coordination: (Slowed movements were noted.)  Lingual ROM: (Decreased during lingual extension trials with no full point achieved; decreased during lingual elevation trials without use of accessory jaw movement; decreased movements noted bilaterally.)  Lingual Symmetry: (Deviation was noted, to the left, during lingual extension trials.)  Lingual Strength: (Decreased with moderate fasciculations noted during lingual extension trials.)  Lingual Coordination: (Slowed movements were noted.)     Assessed patient's swallowing function with the following observations noted:     Oral Phase  Mastication: Ice chips;Regular solid (Patient exhibited decreased rotary jaw movement during oral prep of ice chip trials and regular solid consistency trials all presented by SLP.)  Suspected Premature Bolus Loss: Ice chips;Puree;Regular solid; Thin - cup (Oral transit of ice chip trials varied from 1-3 seconds in length. Patient exhibited slow oral transit of puree consistency trials and regular solid consistency trials all presented by SLP. Patient exhibited fast oral transit of thin H2O trials presented via cup by SLP.)  Oral Phase - Comment: Min puree consistency residue and min regular solid consistency residue was noted post swallows; all residue cleared from the mouth with additional dry swallows. Oral transit of nectar thick liquid trials, presented via cup by SLP, primarily measured 1-2 seconds in length. Pharyngeal Phase  Suspected Swallow Delay: Ice chips;Puree;Regular solid; Thin - cup (Suspect secondary to oral transit times.)  Laryngeal Elevation: (Patient exhibited sluggish, mild-moderately decreased laryngeal elevation for swallow airway protection.)  Pharyngeal Phase - Comment: No outward S/S penetration/aspiration was noted with any ice chip trial, puree consistency trial, regular solid consistency trial, mildly thick/nectar thick liquid trial, or thin H2O trial presented during assessment this date. At this time, would recommend continuation less restrictive easy to chew consistency with thin liquids. Recommend meds in pudding/applesauce. Will continue to follow.     Electronically signed by RICARDO Scott on 12/13/2021 at 1:14 PM

## 2021-12-13 NOTE — DISCHARGE SUMMARY
Discharge Summary    NAME: Alesia Patino  :  1927  MRN:  [id number]    Admit date:  2021  Discharge date:  2021    Admitting Physician:  Vinicio Martinez MD    Advance Directive: Full Code    Primary Care Physician:  Kong Bustillo MD    Discharge Diagnoses:      Acute kidney injury [facility])    Poor po intake    Mitral valve insufficiency    Aortic valve insufficiency    Essential hypertension    Gastroesophageal reflux disease without esophagitis    Severe malnutrition (HCC)    Stage 3a chronic kidney disease ([address]    Dementia without behavioral disturbance ([address]    Anxiety      Significant Diagnostic Studies:   US RENAL COMPLETE    Result Date: 2021  US RENAL COMPLETE 2021 11:54 AM History: Acute renal insufficiency. Grayscale and color-flow renal ultrasound. Normal size and position of both kidneys. Normal cortical thickness and normal cortical echogenicity. No hydronephrosis or shadowing stone. The right kidney measures 88 x 30 x 43 mm. The left kidney measures 93 x 29 x 32 mm. 1. No acute abnormality is seen. Signed by Dr Sharda Beasley      Pertinent Labs:   CBC:   Recent Labs     21  1207 21  0205 21  0307   WBC 6.3 6.0 6.5   HGB 11.1* 10.4* 10.6*    178 188     BMP:    Recent Labs     21  1207 21  0205 21  0307   * 140 138   K 4.6 4.3 4.5    106 105   CO2 20* 23 20*   BUN 40* 29* 17   CREATININE 1.9* 1.3* 1.0*   GLUCOSE 100 85 94             Hospital Course:  75-year-old female with dementia residing at [facility] presented with daughter present due to to concern for ongoing poor p.o. intake for nearly a week with generalized weakness leading to ambulatory difficulty. She has had very little appetite. Patient was recently started on Ativan by PCP for anxiety and mood disturbance. Initial work-up significant for BRO superimposed on CKD, in setting of dehydration. Admitted and renal function improved with IV fluids.   Added Remeron for mood and appetite. Noted to have significant anxiety on occasion, but no severe behavioral disturbances. Has 24/7 care already at [facility]. Stable for discharge back to [facility]. remeron added to regimen, discussed adding nutritional supplements with meals, and adjust Ativan to take only as needed. Physical Exam:  Vital Signs: BP (!) 140/52   Pulse 65   Temp 96.8 °F (36 °C) (Temporal)   Resp 14   SpO2 98%   General appearance:. Alert and Cooperative   HEENT: Normocephalic.  atraumatic  Chest: clear to auscultation bilaterally without wheezes or rhonchi. Cardiac: Normal heart tones with regular rate and rhythm, S1, S2 normal.  Abdomen:soft, non-tender; normal bowel sounds  Extremities: No clubbing or cyanosis. No peripheral edema. Peripheral pulses palpable. Neurologic: alert, follows commands, pleasantly confused at baseline    Discharge Medications:      Current Discharge Medication List           Details   LORazepam (ATIVAN) 0.5 MG tablet Take 1 tablet by mouth 2 times daily as needed for Anxiety for up to 3 days.   Qty: 6 tablet, Refills: 0    Associated Diagnoses: Anxiety      mirtazapine (REMERON) 15 MG tablet Take 1 tablet by mouth nightly  Qty: 30 tablet, Refills: 0              Details   melatonin 5 MG TBDP disintegrating tablet Take 1 tablet by mouth nightly  Qty: 30 tablet, Refills: 1      vitamin B-12 500 MCG tablet Take 1 tablet by mouth daily  Qty: 30 tablet, Refills: 3      hydrOXYzine (ATARAX) 10 MG tablet Take 1 tablet by mouth 3 times daily as needed for Anxiety  Qty: 30 tablet, Refills: 1      vitamin D (ERGOCALCIFEROL) 1.25 MG (82176 UT) CAPS capsule Take 50,000 Units by mouth once a week      estradiol (ESTRACE) 1 MG tablet Take 1 mg by mouth daily      potassium chloride (MICRO-K) 10 MEQ extended release capsule Take 10 mEq by mouth daily       Multiple Vitamins-Minerals (THERAPEUTIC MULTIVITAMIN-MINERALS) tablet Take 1 tablet by mouth daily      citalopram (CELEXA) 10 MG tablet Take 10 mg by mouth daily      pantoprazole sodium (PROTONIX) 40 MG PACK packet Take 40 mg by mouth every morning (before breakfast)      cloNIDine (CATAPRES) 0.1 MG tablet Take 0.1 mg by mouth 2 times daily      diltiazem (DILACOR XR) 240 MG extended release capsule Take 240 mg by mouth daily               Discharge Instructions: Follow up with Johana Strong MD within 1 week. Take medications as directed. Resume activity as tolerated. Disposition: Patient is medically stable and will be discharged back to assisted living facility. Time spent on discharge 35 minutes.      Signed:  Melany Schumacher MD  12/13/2021 11:51 AM

## 2021-12-14 ENCOUNTER — TRANSITIONAL CARE MANAGEMENT TELEPHONE ENCOUNTER (OUTPATIENT)
Dept: CALL CENTER | Facility: HOSPITAL | Age: 86
End: 2021-12-14

## 2021-12-14 NOTE — OUTREACH NOTE
Call Center TCM Note      Responses   [facility] patient discharged from? Non-   Does the patient have one of the following disease processes/diagnoses(primary or secondary)? Other   TCM attempt successful? Yes   Call start time 1227   Call end time 1233   Discharge diagnosis REMI   Is patient permission given to speak with other caregiver? Yes   List who call center can speak with daughter- Alix   Person spoke with today (if not patient) and relationship daughter   Does the patient have all medications ordered at discharge? Yes   Is the patient taking all medications as directed (includes completed medication regime)? Yes   Does the patient have a primary care provider?  Yes   Does the patient have an appointment with their PCP within 7 days of discharge? Yes   Comments regarding PCP hospital f/u with CEDRIC Burgos on 12/20   Has the patient kept scheduled appointments due by today? N/A   What is the Home health agency?  PT, OT and Speech   Has home health visited the patient within 72 hours of discharge? Yes   Did the patient receive a copy of their discharge instructions? Yes   What is the patient's perception of their health status since discharge? Improving   Is the patient/caregiver able to teach back the hierarchy of who to call/visit for symptoms/problems? PCP, Specialist, Home health nurse, Urgent Care, ED, 911 Yes   TCM call completed? Yes   Wrap up additional comments Per daughter, patient is doing much better, no questions or concerns, confirmed appt with PCP office for 12/20.          Pati Carr RN    12/14/2021, 12:33 EST

## 2021-12-17 ENCOUNTER — HOSPITAL ENCOUNTER (EMERGENCY)
Age: 86
Discharge: OTHER INSTITUTION | End: 2021-12-17
Attending: EMERGENCY MEDICINE
Payer: MEDICARE

## 2021-12-17 ENCOUNTER — APPOINTMENT (OUTPATIENT)
Dept: CT IMAGING | Age: 86
End: 2021-12-17
Payer: MEDICARE

## 2021-12-17 VITALS
HEART RATE: 95 BPM | SYSTOLIC BLOOD PRESSURE: 121 MMHG | TEMPERATURE: 98 F | RESPIRATION RATE: 18 BRPM | DIASTOLIC BLOOD PRESSURE: 51 MMHG | OXYGEN SATURATION: 90 %

## 2021-12-17 DIAGNOSIS — R42 DIZZINESS: ICD-10-CM

## 2021-12-17 DIAGNOSIS — R11.0 NAUSEA: Primary | ICD-10-CM

## 2021-12-17 DIAGNOSIS — F03.90 DEMENTIA WITHOUT BEHAVIORAL DISTURBANCE, UNSPECIFIED DEMENTIA TYPE: ICD-10-CM

## 2021-12-17 LAB
ALBUMIN SERPL-MCNC: 3.8 G/DL (ref 3.5–5.2)
ALP BLD-CCNC: 52 U/L (ref 35–104)
ALT SERPL-CCNC: 24 U/L (ref 5–33)
ANION GAP SERPL CALCULATED.3IONS-SCNC: 18 MMOL/L (ref 7–19)
AST SERPL-CCNC: 31 U/L (ref 5–32)
BASOPHILS ABSOLUTE: 0 K/UL (ref 0–0.2)
BASOPHILS RELATIVE PERCENT: 0.2 % (ref 0–1)
BILIRUB SERPL-MCNC: 0.5 MG/DL (ref 0.2–1.2)
BUN BLDV-MCNC: 18 MG/DL (ref 8–23)
CALCIUM SERPL-MCNC: 9.3 MG/DL (ref 8.2–9.6)
CHLORIDE BLD-SCNC: 99 MMOL/L (ref 98–111)
CO2: 22 MMOL/L (ref 22–29)
CREAT SERPL-MCNC: 1.7 MG/DL (ref 0.5–0.9)
EKG P AXIS: 72 DEGREES
EKG P-R INTERVAL: 130 MS
EKG Q-T INTERVAL: 320 MS
EKG QRS DURATION: 72 MS
EKG QTC CALCULATION (BAZETT): 409 MS
EKG T AXIS: 79 DEGREES
EOSINOPHILS ABSOLUTE: 0 K/UL (ref 0–0.6)
EOSINOPHILS RELATIVE PERCENT: 0.4 % (ref 0–5)
GFR AFRICAN AMERICAN: 34
GFR NON-AFRICAN AMERICAN: 28
GLUCOSE BLD-MCNC: 152 MG/DL (ref 74–109)
HCT VFR BLD CALC: 41.4 % (ref 37–47)
HEMOGLOBIN: 13.4 G/DL (ref 12–16)
IMMATURE GRANULOCYTES #: 0 K/UL
LYMPHOCYTES ABSOLUTE: 1.5 K/UL (ref 1.1–4.5)
LYMPHOCYTES RELATIVE PERCENT: 16.4 % (ref 20–40)
MCH RBC QN AUTO: 31.1 PG (ref 27–31)
MCHC RBC AUTO-ENTMCNC: 32.4 G/DL (ref 33–37)
MCV RBC AUTO: 96.1 FL (ref 81–99)
MONOCYTES ABSOLUTE: 0.2 K/UL (ref 0–0.9)
MONOCYTES RELATIVE PERCENT: 2.4 % (ref 0–10)
NEUTROPHILS ABSOLUTE: 7.2 K/UL (ref 1.5–7.5)
NEUTROPHILS RELATIVE PERCENT: 80.4 % (ref 50–65)
PDW BLD-RTO: 13.1 % (ref 11.5–14.5)
PLATELET # BLD: 250 K/UL (ref 130–400)
PMV BLD AUTO: 10.2 FL (ref 9.4–12.3)
POTASSIUM SERPL-SCNC: 4.2 MMOL/L (ref 3.5–5)
RBC # BLD: 4.31 M/UL (ref 4.2–5.4)
SARS-COV-2, NAAT: NOT DETECTED
SODIUM BLD-SCNC: 139 MMOL/L (ref 136–145)
TOTAL PROTEIN: 6.6 G/DL (ref 6.6–8.7)
WBC # BLD: 8.9 K/UL (ref 4.8–10.8)

## 2021-12-17 PROCEDURE — 36415 COLL VENOUS BLD VENIPUNCTURE: CPT

## 2021-12-17 PROCEDURE — 99285 EMERGENCY DEPT VISIT HI MDM: CPT

## 2021-12-17 PROCEDURE — 87635 SARS-COV-2 COVID-19 AMP PRB: CPT

## 2021-12-17 PROCEDURE — 2580000003 HC RX 258: Performed by: EMERGENCY MEDICINE

## 2021-12-17 PROCEDURE — 70450 CT HEAD/BRAIN W/O DYE: CPT

## 2021-12-17 PROCEDURE — 93005 ELECTROCARDIOGRAM TRACING: CPT | Performed by: EMERGENCY MEDICINE

## 2021-12-17 PROCEDURE — 85025 COMPLETE CBC W/AUTO DIFF WBC: CPT

## 2021-12-17 PROCEDURE — 80053 COMPREHEN METABOLIC PANEL: CPT

## 2021-12-17 RX ORDER — SODIUM CHLORIDE, SODIUM LACTATE, POTASSIUM CHLORIDE, AND CALCIUM CHLORIDE .6; .31; .03; .02 G/100ML; G/100ML; G/100ML; G/100ML
500 INJECTION, SOLUTION INTRAVENOUS ONCE
Status: COMPLETED | OUTPATIENT
Start: 2021-12-17 | End: 2021-12-17

## 2021-12-17 RX ORDER — TRAZODONE HYDROCHLORIDE 50 MG/1
TABLET ORAL
Qty: 30 TABLET | Refills: 1 | Status: SHIPPED | OUTPATIENT
Start: 2021-12-17

## 2021-12-17 RX ADMIN — SODIUM CHLORIDE, POTASSIUM CHLORIDE, SODIUM LACTATE AND CALCIUM CHLORIDE 500 ML: 600; 310; 30; 20 INJECTION, SOLUTION INTRAVENOUS at 15:57

## 2021-12-17 ASSESSMENT — ENCOUNTER SYMPTOMS
ABDOMINAL PAIN: 0
VOMITING: 1
SHORTNESS OF BREATH: 0
DIARRHEA: 1

## 2021-12-17 NOTE — ED PROVIDER NOTES
[facility] EMERGENCY DEPT  eMERGENCY dEPARTMENT eNCOUnter      Pt Name: Sravanthi Spears  MRN: [id number]  [place] 4/25/1927  Date of evaluation: 12/17/2021  Provider: Amrik St MD    [address] [place] [address]       Chief Complaint   Patient presents with    Nausea    Dizziness         HISTORY OF PRESENT ILLNESS   (Location/Symptom, Timing/Onset,Context/Setting, Quality, Duration, Modifying Factors, Severity)  Note limiting factors. Sravanthi Spears is a 80 y.o. female who presents to the emergency department for evaluation of nausea and a couple episodes of vomiting. Patient states that she is also been feeling a little bit dizzy for the past few days. Upon arrival here to the ED she is not really complaining of any abdominal pain. Apparently she did have 1 diarrheal-like stool right about the time she got here. She had a recent inpatient hospital admission about 1 week previously secondary to decreased oral intake and failure to thrive. She currently resides at an assisted living facility. She denies any chest pain or shortness of breath. Patient denies any hip or back pain. HPI    NursingNotes were reviewed. REVIEW OF SYSTEMS    (2-9 systems for level 4, 10 or more for level 5)     Review of Systems   Constitutional: Positive for fatigue. Negative for chills and fever. Respiratory: Negative for shortness of breath. Cardiovascular: Negative for chest pain. Gastrointestinal: Positive for diarrhea and vomiting. Negative for abdominal pain. Neurological: Positive for dizziness. Negative for syncope and speech difficulty. All other systems reviewed and are negative.            PAST MEDICALHISTORY     Past Medical History:   Diagnosis Date    Heart palpitations     Hypertension          SURGICAL HISTORY       Past Surgical History:   Procedure Laterality Date    CAROTID ENDARTERECTOMY      CHOLECYSTECTOMY      HYSTERECTOMY      KIDNEY STONE SURGERY           CURRENT MEDICATIONS     Previous Medications    CITALOPRAM (CELEXA) 10 MG TABLET    Take 10 mg by mouth daily    CLONIDINE (CATAPRES) 0.1 MG TABLET    Take 0.1 mg by mouth 2 times daily    DILTIAZEM (DILACOR XR) 240 MG EXTENDED RELEASE CAPSULE    Take 240 mg by mouth daily    ESTRADIOL (ESTRACE) 1 MG TABLET    Take 1 mg by mouth daily    HYDROXYZINE (ATARAX) 10 MG TABLET    Take 1 tablet by mouth 3 times daily as needed for Anxiety    MELATONIN 5 MG TBDP DISINTEGRATING TABLET    Take 1 tablet by mouth nightly    MIRTAZAPINE (REMERON) 15 MG TABLET    Take 1 tablet by mouth nightly    MULTIPLE VITAMINS-MINERALS (THERAPEUTIC MULTIVITAMIN-MINERALS) TABLET    Take 1 tablet by mouth daily    PANTOPRAZOLE SODIUM (PROTONIX) 40 MG PACK PACKET    Take 40 mg by mouth every morning (before breakfast)    POTASSIUM CHLORIDE (MICRO-K) 10 MEQ EXTENDED RELEASE CAPSULE    Take 10 mEq by mouth daily     VITAMIN B-12 500 MCG TABLET    Take 1 tablet by mouth daily    VITAMIN D (ERGOCALCIFEROL) 1.25 MG (99065 UT) CAPS CAPSULE    Take 50,000 Units by mouth once a week       ALLERGIES     Codeine    FAMILY HISTORY       Family History   Problem Relation Age of Onset    Stroke Mother         CAD, palpitations,  12          SOCIAL HISTORY       Social History     Socioeconomic History    Marital status:       Spouse name: None    Number of children: None    Years of education: None    Highest education level: None   Occupational History    None   Tobacco Use    Smoking status: Never Smoker    Smokeless tobacco: Never Used   Vaping Use    Vaping Use: Never used   Substance and Sexual Activity    Alcohol use: No     Alcohol/week: 0.0 standard drinks    Drug use: No    Sexual activity: None   Other Topics Concern    None   Social History Narrative    None     Social Determinants of Health     Financial Resource Strain:     Difficulty of Paying Living Expenses: Not on file   Food Insecurity:     Worried About Running Out of Food in the Last Year: Not on file    Ran Out of Food in the Last Year: Not on file   Transportation Needs:     Lack of Transportation (Medical): Not on file    Lack of Transportation (Non-Medical): Not on file   Physical Activity:     Days of Exercise per Week: Not on file    Minutes of Exercise per Session: Not on file   Stress:     Feeling of Stress : Not on file   Social Connections:     Frequency of Communication with Friends and Family: Not on file    Frequency of Social Gatherings with Friends and Family: Not on file    Attends [employer] Services: Not on file    Active Member of [address] [place] [address] [employer] or Organizations: Not on file    Attends Club or Organization Meetings: Not on file    Marital Status: Not on file   Intimate Partner Violence:     Fear of Current or Ex-Partner: Not on file    Emotionally Abused: Not on file    Physically Abused: Not on file    Sexually Abused: Not on file   Housing Stability:     Unable to Pay for Housing in the Last Year: Not on file    Number of [place] in the Last Year: Not on file    Unstable Housing in the Last Year: Not on file       SCREENINGS    [place] Coma Scale  Eye Opening: Spontaneous  Best Verbal Response: Confused  Best Motor Response: Obeys commands  [place] Coma Scale Score: 14        PHYSICAL EXAM    (up to 7 for level 4, 8 or more for level 5)     ED Triage Vitals [12/17/21 1136]   BP Temp Temp Source Pulse Resp SpO2 Height Weight   (!) 121/55 98 °F (36.7 °C) Oral 111 18 98 % -- --       Physical Exam  Vitals and nursing note reviewed. HENT:      Head: Atraumatic. Mouth/Throat:      Mouth: Mucous membranes are moist. Mucous membranes are not dry. Eyes:      General: No scleral icterus. Pupils: Pupils are equal, round, and reactive to light. Neck:      Trachea: No tracheal deviation. Cardiovascular:      Rate and Rhythm: Normal rate and regular rhythm. Heart sounds: Normal heart sounds. No murmur heard.       Pulmonary:      Effort: Pulmonary effort is normal. No respiratory distress. Breath sounds: Normal breath sounds. No stridor. Abdominal:      General: There is no distension. Palpations: Abdomen is soft. Tenderness: There is no abdominal tenderness. There is no guarding. Musculoskeletal:      Right hip: No tenderness. Left hip: No tenderness. Right lower leg: No edema. Left lower leg: No edema. Skin:     Coloration: Skin is not pale. Findings: No rash. Neurological:      General: No focal deficit present. Mental Status: She is alert and oriented to person, place, and time. Cranial Nerves: No cranial nerve deficit. Psychiatric:         Behavior: Behavior is cooperative. DIAGNOSTIC RESULTS       RADIOLOGY:  Non-plain film images such as CT, Ultrasound and MRI are read by the radiologist. Plain radiographic images are visualized and preliminarily interpreted bythe emergency physician with the below findings:      [address]   Final Result   1. No acute intracranial findings. 2.  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 Signed by Dr Riley Sims:  Labs Reviewed   CBC WITH AUTO DIFFERENTIAL - Abnormal; Notable for the following components:       Result Value    MCH 31.1 (*)     MCHC 32.4 (*)     Neutrophils % 80.4 (*)     Lymphocytes % 16.4 (*)     All other components within normal limits   COMPREHENSIVE METABOLIC PANEL - Abnormal; Notable for the following components:    Glucose 152 (*)     CREATININE 1.7 (*)     GFR Non- 28 (*)     GFR  34 (*)     All other components within normal limits       All other labs were within normal range or not returned as of this dictation.     EMERGENCY DEPARTMENT COURSE and DIFFERENTIAL DIAGNOSIS/MDM:   Vitals:    Vitals:    12/17/21 1136 12/17/21 1354 12/17/21 1631   BP: (!) 121/55 112/70 (!) 121/51   Pulse: 111 102 95   Resp: 18 18 18   Temp: 98 °F (36.7 °C)     TempSrc: Oral     SpO2: 98% 96% 90%       MDM    Reassessment    Patient's laboratory studies look okay here. The CT scan of her brain does not reveal any acute pathology. Apparently she has already been accepted to the geriatric behavioral health unit at [facility].  The family is on board with this plan of care. Accepting physician is Dr. Virgen Veras. A physician to physician conversation is not required for acceptance of this transfer. PROCEDURES:  Unless otherwise noted below, none     Procedures    FINAL IMPRESSION      1. Nausea    2. Dizziness    3.  Dementia without behavioral disturbance, unspecified dementia type [facility])          DISPOSITION/PLAN   DISPOSITION Decision To Discharge 12/17/2021 05:09:34 PM      (Please note that portions of this note were completed with a voice recognition program.  Efforts were made to edit thedictations but occasionally words are mis-transcribed.)    Beatriz Landeros MD (electronically signed)  Attending Emergency Physician         Beatriz Landeros MD  12/17/21 7186

## 2021-12-17 NOTE — CARE COORDINATION
Spoke with daughter, could not get work phones to call out, but my cell phone worked, any future attempts for her Bernestine [place] - [phone]) maybe needed to do the same. She is on her way to [place] now with her mothers belongings. She is not opposed to [address] [place] [address] at [facility], states she went there before and it helped, pt was able to gain 15 pounds back.  Electronically signed by Kathryn eLslie on 12/17/2021 at 4:28 PM

## 2021-12-17 NOTE — ED NOTES
Bed: 03  Expected date:   Expected time:   Means of arrival:   Comments:  EMS     Ann Paula RN  12/17/21 1133

## 2021-12-17 NOTE — CARE COORDINATION
Notified pt of results, she verbalized understanding    ----- Message from Altagracia Mcadams RN sent at 7/20/2017 12:52 PM CDT -----      ----- Message -----  From: Fernie Vasquez MD  Sent: 7/19/2017   3:32 PM  To: Altagracia Mcadams RN    Please notify the patient that her cardiac event monitor showed no significant slow heart rates.  I would be happy to discuss with her further over the phone or in clinic if she would like to talk in person.     Recent admit, pt came in under circumstances that will have sw following, work up in [facility] 19 current Electronically signed by Georgia Song on 12/17/2021 at 3:44 PM

## 2021-12-17 NOTE — ED NOTES
Pt was visibly soiled with bowel movement, and pt did not have any underwear or a brief on from assisted living. Pts soiled clothes were placed in a belonging bag and pt was cleaned and changed.       Rufus Lutz  12/17/21 6022

## 2021-12-20 ENCOUNTER — TELEPHONE (OUTPATIENT)
Dept: INTERNAL MEDICINE | Facility: CLINIC | Age: 86
End: 2021-12-20

## 2021-12-20 NOTE — TELEPHONE ENCOUNTER
Caller: Alix Leblanc    Relationship: Emergency Contact    Best call back number: [phone]-MS [facility] [facility] NUMBER    Who are you requesting to speak with (clinical staff, provider,  specific staff member): CLINICALSTAFF    Do you know the name of the person who called: DAUGHTER ALIX LEBLANC    What was the call regarding: PATIENT WAS MOVED TO [place] [facility]--12/17/21--PATIENT IS NOT WANTING TO EAT, SHE'S VERY ANXIOUS, DISORIENTED.     MS ALIX LEBLANC SAID THE EXPECTED TO BE IN [place] FOR A WEEK. SHE DOES NOT WANT TO RESCHEDULE HER HOSPITAL FOLLOW UP, AT THIS TIME.    Do you require a callback: NO

## 2021-12-23 NOTE — PROGRESS NOTES
Physician Progress Note      PATIENT:               Paulino Hall  CSN #:                  [id number]  :                       [id number]  ADMIT DATE:       2021 12:00 PM  Cristian Quintana [place] DATE:        2021 12:50 PM  RESPONDING  PROVIDER #:        Dylon Martinez          QUERY TEXT:    Patient admitted with BRO. Noted documentation of severe malnutrition. In   order to support the diagnosis of severe malnutrition, please include   additional clinical indicators in your documentation. Or please document if   the diagnosis of severe malnutrition has been ruled out after further study. The medical record reflects the following:  Risk Factors: BMI 18, Pt has not been eating or drinking for the past week. BRO  Clinical Indicators: BMI. Poor po intake for the past week. Dementia. No   dietary consult. Treatment: labs, monitoring, nutritional supplements. Thank you    Marcos Rivas RN, BSN, CDI  [phone]  Options provided:  -- Severe malnutrition  present as evidenced by, Please document evidence. -- Severe malnutrition was ruled out  -- Other - I will add my own diagnosis  -- Disagree - Not applicable / Not valid  -- Disagree - Clinically unable to determine / Unknown  -- Refer to Clinical Documentation Reviewer    PROVIDER RESPONSE TEXT:    Provider is clinically unable to determine a response to this query. Query created by: Nallely Burnham on 2021 8:29 AM      QUERY TEXT:    Pt admitted with BRO. Pt noted to have Sodium 133 given IVF and Sodium   increased to 140. If possible, please document in the progress notes and   discharge summary if you are evaluating and / or treating any of the   following: The medical record reflects the following:  Risk Factors: BRO, dementia, poor po intake. Clinical Indicators:  increased to 140 with IVF.   Treatment: Labs, IVF 75 ml/hr    Thank you    Marcos Rivas RN, BSN, CDI  [phone]  Options provided:  -- Hyponatremia  -- Pseudohyponatremia  -- Clinically insignificant low serum sodium  -- Other - I will add my own diagnosis  -- Disagree - Not applicable / Not valid  -- Disagree - Clinically unable to determine / Unknown  -- Refer to Clinical Documentation Reviewer    PROVIDER RESPONSE TEXT:    Provider is clinically unable to determine a response to this query.     Query created by: Nallely Burnham on 12/20/2021 8:32 AM      Electronically signed by:  Dylon Martinez 12/23/2021 8:12 AM

## 2021-12-29 ENCOUNTER — TELEPHONE (OUTPATIENT)
Dept: INTERNAL MEDICINE | Facility: CLINIC | Age: 86
End: 2021-12-29

## 2021-12-29 NOTE — TELEPHONE ENCOUNTER
Caller: Alix Jama    Relationship: Emergency Contact    Best call back number: [phone]    What is the best time to reach you: ANY    Who are you requesting to speak with (clinical staff, provider,  specific staff member) CLINICAL        What was the call regarding: PATIENT HAS BEEN IN [facility]. PATIENT WENT TO [facility] AND THEY WILL BE SENDING HER RECORDS OVER TO OUR OFFICE. PATIENTS DAUGHTER WANTS THE OFFICE TO CALL HER ONCE THEY RECEIVE IT TO SET UP AN APPT TO BRING THE PATIENT IN. PLEASE ADVISE    Do you require a callback: YES

## 2021-12-29 NOTE — TELEPHONE ENCOUNTER
Difficult for the right person to know when those records are received, as far as making her appt, so the best route would be for dgt to obtain those records in hand herself, from [facility], to bring to a hfu appt for her.  Please call her and offer to make a hfu appt.

## 2022-01-04 NOTE — TELEPHONE ENCOUNTER
Caller: Svetlana Jose    Relationship:     Best call back number: [phone]    Requested Prescriptions:   Requested Prescriptions     Pending Prescriptions Disp Refills   • Cyanocobalamin 1000 MCG sublingual tablet 1 each 5     Sig: Place 1,000 mcg under the tongue Daily.        Pharmacy where request should be sent: [facility] [address] [place] [address] S-D - [phone] PH - [phone] FX     Additional details provided by patient:     Completely out.     Does the patient have less than a 3 day supply:  [x] Yes  [] No    Celio Olsen MA   01/04/22 13:05 CST

## 2022-01-24 ENCOUNTER — READMISSION MANAGEMENT (OUTPATIENT)
Dept: CALL CENTER | Facility: HOSPITAL | Age: 87
End: 2022-01-24

## 2022-01-24 NOTE — OUTREACH NOTE
Prep Survey      Responses   [facility] facility patient discharged from? Non-BH   Is LACE score < 7 ? Non-BH Discharge   Emergency Room discharge w/ pulse ox? No   Eligibility [facility]   Date of Discharge 01/24/22   Discharge diagnosis Unavailable   Does the patient have one of the following disease processes/diagnoses(primary or secondary)? Other   Prep survey completed? Yes          Gretchen Kidd RN

## 2022-01-25 ENCOUNTER — TRANSITIONAL CARE MANAGEMENT TELEPHONE ENCOUNTER (OUTPATIENT)
Dept: CALL CENTER | Facility: HOSPITAL | Age: 87
End: 2022-01-25

## 2022-01-25 NOTE — OUTREACH NOTE
Call Center TCM Note      Responses   [facility] patient discharged from? Non-   Does the patient have one of the following disease processes/diagnoses(primary or secondary)? Other   TCM attempt successful? Yes   Call start time 1724   Change in Health Status Patient   [Spoke to daughter Alix and patient passed away peacefully in her sleep on 2022 ]          Mark Avelar RN    2022, 17:26 EST

## 2022-01-26 ENCOUNTER — TELEPHONE (OUTPATIENT)
Dept: INTERNAL MEDICINE | Facility: CLINIC | Age: 87
End: 2022-01-26

## 2022-01-26 NOTE — TELEPHONE ENCOUNTER
"FYI - this is a HUB message:    \"Called to check on Ms. Sherwood and spoke to her daughter; Alix, who states patient passed away peacefully in sleep yesterday on 1/24/2022. She wanted to make sure Dr. Sinclair and staff knew and was appreciative of all the years of care to her parents.\"    "

## (undated) DEVICE — CUFF,BP,DISP,1 TUBE,ADULT,HP: Brand: MEDLINE

## (undated) DEVICE — SENSR O2 OXIMAX FNGR A/ 18IN NONSTR

## (undated) DEVICE — ENDOGATOR AUXILIARY WATER JET CONNECTOR: Brand: ENDOGATOR

## (undated) DEVICE — THE CHANNEL CLEANING BRUSH IS A NYLON FLEXI BRUSH ATTACHED TO A FLEXIBLE PLASTIC SHEATH DESIGNED TO SAFELY REMOVE DEBRIS FROM FLEXIBLE ENDOSCOPES.

## (undated) DEVICE — TBG SMPL FLTR LINE NASL 02/C02 A/ BX/100

## (undated) DEVICE — CONMED SCOPE SAVER BITE BLOCK, 20X27 MM: Brand: SCOPE SAVER

## (undated) DEVICE — MASK,OXYGEN,MED CONC,ADLT,7' TUB, UC: Brand: PENDING

## (undated) DEVICE — Device: Brand: DEFENDO AIR/WATER/SUCTION AND BIOPSY VALVE

## (undated) DEVICE — YANKAUER,BULB TIP WITH VENT: Brand: ARGYLE